# Patient Record
Sex: MALE | Race: WHITE | NOT HISPANIC OR LATINO | Employment: OTHER | URBAN - METROPOLITAN AREA
[De-identification: names, ages, dates, MRNs, and addresses within clinical notes are randomized per-mention and may not be internally consistent; named-entity substitution may affect disease eponyms.]

---

## 2017-01-07 ENCOUNTER — COMMUNICATION - HEALTHEAST (OUTPATIENT)
Dept: FAMILY MEDICINE | Facility: CLINIC | Age: 62
End: 2017-01-07

## 2017-01-07 DIAGNOSIS — K21.9 GERD (GASTROESOPHAGEAL REFLUX DISEASE): ICD-10-CM

## 2017-01-19 ENCOUNTER — RECORDS - HEALTHEAST (OUTPATIENT)
Dept: ADMINISTRATIVE | Facility: OTHER | Age: 62
End: 2017-01-19

## 2017-02-01 ENCOUNTER — OFFICE VISIT - HEALTHEAST (OUTPATIENT)
Dept: FAMILY MEDICINE | Facility: CLINIC | Age: 62
End: 2017-02-01

## 2017-02-01 DIAGNOSIS — Z00.00 PHYSICAL EXAM: ICD-10-CM

## 2017-02-01 DIAGNOSIS — E78.5 HYPERLIPIDEMIA, UNSPECIFIED HYPERLIPIDEMIA TYPE: ICD-10-CM

## 2017-02-01 LAB
CHOLEST SERPL-MCNC: 228 MG/DL
FASTING STATUS PATIENT QL REPORTED: YES
HDLC SERPL-MCNC: 43 MG/DL
LDLC SERPL CALC-MCNC: 145 MG/DL
TRIGL SERPL-MCNC: 198 MG/DL

## 2017-02-01 ASSESSMENT — MIFFLIN-ST. JEOR: SCORE: 1776.9

## 2017-02-02 ENCOUNTER — COMMUNICATION - HEALTHEAST (OUTPATIENT)
Dept: FAMILY MEDICINE | Facility: CLINIC | Age: 62
End: 2017-02-02

## 2017-02-23 ENCOUNTER — COMMUNICATION - HEALTHEAST (OUTPATIENT)
Dept: FAMILY MEDICINE | Facility: CLINIC | Age: 62
End: 2017-02-23

## 2017-02-23 DIAGNOSIS — E78.5 HYPERLIPIDEMIA: ICD-10-CM

## 2017-03-03 ENCOUNTER — COMMUNICATION - HEALTHEAST (OUTPATIENT)
Dept: FAMILY MEDICINE | Facility: CLINIC | Age: 62
End: 2017-03-03

## 2017-03-03 DIAGNOSIS — E78.00 HYPERCHOLESTEREMIA: ICD-10-CM

## 2017-05-03 ENCOUNTER — OFFICE VISIT - HEALTHEAST (OUTPATIENT)
Dept: FAMILY MEDICINE | Facility: CLINIC | Age: 62
End: 2017-05-03

## 2017-05-03 DIAGNOSIS — M19.90 ARTHRITIS: ICD-10-CM

## 2017-05-03 ASSESSMENT — MIFFLIN-ST. JEOR: SCORE: 1754.22

## 2017-05-08 ENCOUNTER — COMMUNICATION - HEALTHEAST (OUTPATIENT)
Dept: FAMILY MEDICINE | Facility: CLINIC | Age: 62
End: 2017-05-08

## 2017-05-08 ENCOUNTER — AMBULATORY - HEALTHEAST (OUTPATIENT)
Dept: FAMILY MEDICINE | Facility: CLINIC | Age: 62
End: 2017-05-08

## 2017-05-08 DIAGNOSIS — M19.90 ARTHRITIS: ICD-10-CM

## 2017-05-17 ENCOUNTER — RECORDS - HEALTHEAST (OUTPATIENT)
Dept: GENERAL RADIOLOGY | Age: 62
End: 2017-05-17

## 2017-05-17 ENCOUNTER — OFFICE VISIT - HEALTHEAST (OUTPATIENT)
Dept: RHEUMATOLOGY | Facility: CLINIC | Age: 62
End: 2017-05-17

## 2017-05-17 DIAGNOSIS — M10.9 GOUT, UNSPECIFIED: ICD-10-CM

## 2017-05-17 DIAGNOSIS — M10.9 GOUTY ARTHROPATHY: ICD-10-CM

## 2017-05-17 DIAGNOSIS — M25.50 PAIN IN UNSPECIFIED JOINT: ICD-10-CM

## 2017-05-17 DIAGNOSIS — M25.50 POLYARTHRALGIA: ICD-10-CM

## 2017-05-17 DIAGNOSIS — M15.0 PRIMARY OSTEOARTHRITIS INVOLVING MULTIPLE JOINTS: ICD-10-CM

## 2017-05-17 LAB
ALT SERPL W P-5'-P-CCNC: 32 U/L (ref 0–45)
CREAT SERPL-MCNC: 0.95 MG/DL (ref 0.7–1.3)
GFR SERPL CREATININE-BSD FRML MDRD: >60 ML/MIN/1.73M2

## 2017-08-08 ENCOUNTER — COMMUNICATION - HEALTHEAST (OUTPATIENT)
Dept: FAMILY MEDICINE | Facility: CLINIC | Age: 62
End: 2017-08-08

## 2017-08-08 DIAGNOSIS — K21.9 GERD (GASTROESOPHAGEAL REFLUX DISEASE): ICD-10-CM

## 2017-08-09 ENCOUNTER — OFFICE VISIT - HEALTHEAST (OUTPATIENT)
Dept: RHEUMATOLOGY | Facility: CLINIC | Age: 62
End: 2017-08-09

## 2017-08-09 DIAGNOSIS — M10.9 GOUTY ARTHROPATHY: ICD-10-CM

## 2017-08-09 DIAGNOSIS — M15.0 PRIMARY OSTEOARTHRITIS INVOLVING MULTIPLE JOINTS: ICD-10-CM

## 2017-08-09 ASSESSMENT — MIFFLIN-ST. JEOR: SCORE: 1731.54

## 2018-02-03 ENCOUNTER — COMMUNICATION - HEALTHEAST (OUTPATIENT)
Dept: FAMILY MEDICINE | Facility: CLINIC | Age: 63
End: 2018-02-03

## 2018-02-03 DIAGNOSIS — K21.9 GERD (GASTROESOPHAGEAL REFLUX DISEASE): ICD-10-CM

## 2018-02-14 ENCOUNTER — OFFICE VISIT - HEALTHEAST (OUTPATIENT)
Dept: FAMILY MEDICINE | Facility: CLINIC | Age: 63
End: 2018-02-14

## 2018-02-14 DIAGNOSIS — Z23 NEED FOR IMMUNIZATION AGAINST INFLUENZA: ICD-10-CM

## 2018-02-14 DIAGNOSIS — E55.9 VITAMIN D DEFICIENCY: ICD-10-CM

## 2018-02-14 DIAGNOSIS — Z00.00 PHYSICAL EXAM: ICD-10-CM

## 2018-02-14 DIAGNOSIS — E78.2 MIXED HYPERLIPIDEMIA: ICD-10-CM

## 2018-02-14 LAB
ALBUMIN SERPL-MCNC: 4 G/DL (ref 3.5–5)
ALP SERPL-CCNC: 88 U/L (ref 45–120)
ALT SERPL W P-5'-P-CCNC: 37 U/L (ref 0–45)
ANION GAP SERPL CALCULATED.3IONS-SCNC: 9 MMOL/L (ref 5–18)
AST SERPL W P-5'-P-CCNC: 24 U/L (ref 0–40)
BILIRUB SERPL-MCNC: 0.7 MG/DL (ref 0–1)
BUN SERPL-MCNC: 20 MG/DL (ref 8–22)
CALCIUM SERPL-MCNC: 9.5 MG/DL (ref 8.5–10.5)
CHLORIDE BLD-SCNC: 104 MMOL/L (ref 98–107)
CHOLEST SERPL-MCNC: 214 MG/DL
CO2 SERPL-SCNC: 27 MMOL/L (ref 22–31)
CREAT SERPL-MCNC: 0.81 MG/DL (ref 0.7–1.3)
ERYTHROCYTE [DISTWIDTH] IN BLOOD BY AUTOMATED COUNT: 12 % (ref 11–14.5)
FASTING STATUS PATIENT QL REPORTED: YES
GFR SERPL CREATININE-BSD FRML MDRD: >60 ML/MIN/1.73M2
GLUCOSE BLD-MCNC: 94 MG/DL (ref 70–125)
HCT VFR BLD AUTO: 45.7 % (ref 40–54)
HDLC SERPL-MCNC: 43 MG/DL
HGB BLD-MCNC: 15.5 G/DL (ref 14–18)
LDLC SERPL CALC-MCNC: 130 MG/DL
MCH RBC QN AUTO: 29.5 PG (ref 27–34)
MCHC RBC AUTO-ENTMCNC: 33.9 G/DL (ref 32–36)
MCV RBC AUTO: 87 FL (ref 80–100)
PLATELET # BLD AUTO: 166 THOU/UL (ref 140–440)
PMV BLD AUTO: 8.7 FL (ref 7–10)
POTASSIUM BLD-SCNC: 4.4 MMOL/L (ref 3.5–5)
PROT SERPL-MCNC: 7.3 G/DL (ref 6–8)
RBC # BLD AUTO: 5.25 MILL/UL (ref 4.4–6.2)
SODIUM SERPL-SCNC: 140 MMOL/L (ref 136–145)
TRIGL SERPL-MCNC: 205 MG/DL
WBC: 8 THOU/UL (ref 4–11)

## 2018-02-14 ASSESSMENT — MIFFLIN-ST. JEOR: SCORE: 1758.76

## 2018-02-15 LAB — 25(OH)D3 SERPL-MCNC: 35.3 NG/ML (ref 30–80)

## 2018-02-21 ENCOUNTER — COMMUNICATION - HEALTHEAST (OUTPATIENT)
Dept: FAMILY MEDICINE | Facility: CLINIC | Age: 63
End: 2018-02-21

## 2018-03-07 ENCOUNTER — COMMUNICATION - HEALTHEAST (OUTPATIENT)
Dept: FAMILY MEDICINE | Facility: CLINIC | Age: 63
End: 2018-03-07

## 2018-03-07 DIAGNOSIS — E78.5 HYPERLIPIDEMIA: ICD-10-CM

## 2018-04-12 ENCOUNTER — RECORDS - HEALTHEAST (OUTPATIENT)
Dept: ADMINISTRATIVE | Facility: OTHER | Age: 63
End: 2018-04-12

## 2018-06-08 ENCOUNTER — COMMUNICATION - HEALTHEAST (OUTPATIENT)
Dept: FAMILY MEDICINE | Facility: CLINIC | Age: 63
End: 2018-06-08

## 2018-06-08 DIAGNOSIS — E78.5 HYPERLIPIDEMIA: ICD-10-CM

## 2018-08-01 ENCOUNTER — COMMUNICATION - HEALTHEAST (OUTPATIENT)
Dept: FAMILY MEDICINE | Facility: CLINIC | Age: 63
End: 2018-08-01

## 2018-08-01 DIAGNOSIS — K21.9 GERD (GASTROESOPHAGEAL REFLUX DISEASE): ICD-10-CM

## 2018-11-03 ENCOUNTER — OFFICE VISIT - HEALTHEAST (OUTPATIENT)
Dept: FAMILY MEDICINE | Facility: CLINIC | Age: 63
End: 2018-11-03

## 2018-11-03 DIAGNOSIS — R07.0 THROAT PAIN: ICD-10-CM

## 2018-11-03 DIAGNOSIS — J20.9 ACUTE EXACERBATION OF CHRONIC BRONCHITIS (H): ICD-10-CM

## 2018-11-03 DIAGNOSIS — J42 ACUTE EXACERBATION OF CHRONIC BRONCHITIS (H): ICD-10-CM

## 2018-11-03 LAB — DEPRECATED S PYO AG THROAT QL EIA: NORMAL

## 2018-11-04 LAB — GROUP A STREP BY PCR: NORMAL

## 2018-11-08 ENCOUNTER — RECORDS - HEALTHEAST (OUTPATIENT)
Dept: ADMINISTRATIVE | Facility: OTHER | Age: 63
End: 2018-11-08

## 2018-12-05 ENCOUNTER — OFFICE VISIT - HEALTHEAST (OUTPATIENT)
Dept: FAMILY MEDICINE | Facility: CLINIC | Age: 63
End: 2018-12-05

## 2018-12-05 DIAGNOSIS — Z01.818 PREOPERATIVE EXAMINATION: ICD-10-CM

## 2018-12-05 DIAGNOSIS — M16.12 PRIMARY OSTEOARTHRITIS OF LEFT HIP: ICD-10-CM

## 2018-12-05 DIAGNOSIS — M10.9 GOUTY ARTHROPATHY: ICD-10-CM

## 2018-12-05 DIAGNOSIS — K21.9 GASTROESOPHAGEAL REFLUX DISEASE WITHOUT ESOPHAGITIS: ICD-10-CM

## 2018-12-05 LAB
ANION GAP SERPL CALCULATED.3IONS-SCNC: 10 MMOL/L (ref 5–18)
ATRIAL RATE - MUSE: 72 BPM
BUN SERPL-MCNC: 20 MG/DL (ref 8–22)
CALCIUM SERPL-MCNC: 9.8 MG/DL (ref 8.5–10.5)
CHLORIDE BLD-SCNC: 103 MMOL/L (ref 98–107)
CO2 SERPL-SCNC: 26 MMOL/L (ref 22–31)
CREAT SERPL-MCNC: 0.87 MG/DL (ref 0.7–1.3)
DIASTOLIC BLOOD PRESSURE - MUSE: NORMAL MMHG
ERYTHROCYTE [DISTWIDTH] IN BLOOD BY AUTOMATED COUNT: 11.5 % (ref 11–14.5)
GFR SERPL CREATININE-BSD FRML MDRD: >60 ML/MIN/1.73M2
GLUCOSE BLD-MCNC: 105 MG/DL (ref 70–125)
HCT VFR BLD AUTO: 42.9 % (ref 40–54)
HGB BLD-MCNC: 14.5 G/DL (ref 14–18)
INTERPRETATION ECG - MUSE: NORMAL
MCH RBC QN AUTO: 29.1 PG (ref 27–34)
MCHC RBC AUTO-ENTMCNC: 33.7 G/DL (ref 32–36)
MCV RBC AUTO: 86 FL (ref 80–100)
P AXIS - MUSE: 70 DEGREES
PLATELET # BLD AUTO: 181 THOU/UL (ref 140–440)
PMV BLD AUTO: 9.1 FL (ref 7–10)
POTASSIUM BLD-SCNC: 4.4 MMOL/L (ref 3.5–5)
PR INTERVAL - MUSE: 152 MS
QRS DURATION - MUSE: 88 MS
QT - MUSE: 390 MS
QTC - MUSE: 427 MS
R AXIS - MUSE: 7 DEGREES
RBC # BLD AUTO: 4.97 MILL/UL (ref 4.4–6.2)
SODIUM SERPL-SCNC: 139 MMOL/L (ref 136–145)
SYSTOLIC BLOOD PRESSURE - MUSE: NORMAL MMHG
T AXIS - MUSE: 10 DEGREES
VENTRICULAR RATE- MUSE: 72 BPM
WBC: 8.4 THOU/UL (ref 4–11)

## 2018-12-05 ASSESSMENT — MIFFLIN-ST. JEOR: SCORE: 1758.76

## 2018-12-28 ENCOUNTER — RECORDS - HEALTHEAST (OUTPATIENT)
Dept: ADMINISTRATIVE | Facility: OTHER | Age: 63
End: 2018-12-28

## 2019-01-11 ENCOUNTER — RECORDS - HEALTHEAST (OUTPATIENT)
Dept: ADMINISTRATIVE | Facility: OTHER | Age: 64
End: 2019-01-11

## 2019-01-24 ENCOUNTER — COMMUNICATION - HEALTHEAST (OUTPATIENT)
Dept: FAMILY MEDICINE | Facility: CLINIC | Age: 64
End: 2019-01-24

## 2019-01-24 DIAGNOSIS — K21.9 GERD (GASTROESOPHAGEAL REFLUX DISEASE): ICD-10-CM

## 2019-02-11 ENCOUNTER — RECORDS - HEALTHEAST (OUTPATIENT)
Dept: ADMINISTRATIVE | Facility: OTHER | Age: 64
End: 2019-02-11

## 2019-02-26 ENCOUNTER — COMMUNICATION - HEALTHEAST (OUTPATIENT)
Dept: FAMILY MEDICINE | Facility: CLINIC | Age: 64
End: 2019-02-26

## 2019-02-26 DIAGNOSIS — E78.5 HYPERLIPIDEMIA: ICD-10-CM

## 2019-03-19 ENCOUNTER — COMMUNICATION - HEALTHEAST (OUTPATIENT)
Dept: TELEHEALTH | Facility: CLINIC | Age: 64
End: 2019-03-19

## 2019-03-19 ENCOUNTER — COMMUNICATION - HEALTHEAST (OUTPATIENT)
Dept: FAMILY MEDICINE | Facility: CLINIC | Age: 64
End: 2019-03-19

## 2019-03-19 ENCOUNTER — OFFICE VISIT - HEALTHEAST (OUTPATIENT)
Dept: FAMILY MEDICINE | Facility: CLINIC | Age: 64
End: 2019-03-19

## 2019-03-19 DIAGNOSIS — K21.9 GASTROESOPHAGEAL REFLUX DISEASE WITHOUT ESOPHAGITIS: ICD-10-CM

## 2019-03-19 DIAGNOSIS — M15.0 PRIMARY OSTEOARTHRITIS INVOLVING MULTIPLE JOINTS: ICD-10-CM

## 2019-03-19 DIAGNOSIS — E78.5 HYPERLIPIDEMIA: ICD-10-CM

## 2019-03-19 DIAGNOSIS — M10.9 GOUTY ARTHROPATHY: ICD-10-CM

## 2019-03-19 ASSESSMENT — MIFFLIN-ST. JEOR: SCORE: 1774.18

## 2019-03-28 ENCOUNTER — RECORDS - HEALTHEAST (OUTPATIENT)
Dept: ADMINISTRATIVE | Facility: OTHER | Age: 64
End: 2019-03-28

## 2019-06-18 ENCOUNTER — COMMUNICATION - HEALTHEAST (OUTPATIENT)
Dept: TELEHEALTH | Facility: CLINIC | Age: 64
End: 2019-06-18

## 2019-06-18 ENCOUNTER — OFFICE VISIT - HEALTHEAST (OUTPATIENT)
Dept: FAMILY MEDICINE | Facility: CLINIC | Age: 64
End: 2019-06-18

## 2019-06-18 DIAGNOSIS — I73.9 CLAUDICATION (H): ICD-10-CM

## 2019-06-18 DIAGNOSIS — M25.511 CHRONIC PAIN OF BOTH SHOULDERS: ICD-10-CM

## 2019-06-18 DIAGNOSIS — E78.5 HYPERLIPIDEMIA: ICD-10-CM

## 2019-06-18 DIAGNOSIS — M25.512 CHRONIC PAIN OF BOTH SHOULDERS: ICD-10-CM

## 2019-06-18 DIAGNOSIS — Z00.01 ENCOUNTER FOR GENERAL ADULT MEDICAL EXAMINATION WITH ABNORMAL FINDINGS: ICD-10-CM

## 2019-06-18 DIAGNOSIS — G89.29 CHRONIC PAIN OF BOTH SHOULDERS: ICD-10-CM

## 2019-06-18 LAB
ALBUMIN SERPL-MCNC: 4.1 G/DL (ref 3.5–5)
ALP SERPL-CCNC: 84 U/L (ref 45–120)
ALT SERPL W P-5'-P-CCNC: 31 U/L (ref 0–45)
ANION GAP SERPL CALCULATED.3IONS-SCNC: 8 MMOL/L (ref 5–18)
AST SERPL W P-5'-P-CCNC: 22 U/L (ref 0–40)
BILIRUB SERPL-MCNC: 0.4 MG/DL (ref 0–1)
BUN SERPL-MCNC: 16 MG/DL (ref 8–22)
CALCIUM SERPL-MCNC: 10 MG/DL (ref 8.5–10.5)
CHLORIDE BLD-SCNC: 107 MMOL/L (ref 98–107)
CHOLEST SERPL-MCNC: 190 MG/DL
CO2 SERPL-SCNC: 27 MMOL/L (ref 22–31)
CREAT SERPL-MCNC: 0.85 MG/DL (ref 0.7–1.3)
FASTING STATUS PATIENT QL REPORTED: YES
GFR SERPL CREATININE-BSD FRML MDRD: >60 ML/MIN/1.73M2
GLUCOSE BLD-MCNC: 94 MG/DL (ref 70–125)
HDLC SERPL-MCNC: 47 MG/DL
LDLC SERPL CALC-MCNC: 110 MG/DL
POTASSIUM BLD-SCNC: 4.4 MMOL/L (ref 3.5–5)
PROT SERPL-MCNC: 7.1 G/DL (ref 6–8)
SODIUM SERPL-SCNC: 142 MMOL/L (ref 136–145)
TRIGL SERPL-MCNC: 167 MG/DL

## 2019-06-18 ASSESSMENT — MIFFLIN-ST. JEOR: SCORE: 1749.68

## 2019-06-25 ENCOUNTER — RECORDS - HEALTHEAST (OUTPATIENT)
Dept: VASCULAR ULTRASOUND | Facility: CLINIC | Age: 64
End: 2019-06-25

## 2019-06-25 DIAGNOSIS — I73.9 PERIPHERAL VASCULAR DISEASE, UNSPECIFIED (H): ICD-10-CM

## 2019-06-30 ENCOUNTER — AMBULATORY - HEALTHEAST (OUTPATIENT)
Dept: FAMILY MEDICINE | Facility: CLINIC | Age: 64
End: 2019-06-30

## 2019-06-30 DIAGNOSIS — I73.9 PVD (PERIPHERAL VASCULAR DISEASE) (H): ICD-10-CM

## 2019-07-11 ENCOUNTER — COMMUNICATION - HEALTHEAST (OUTPATIENT)
Dept: FAMILY MEDICINE | Facility: CLINIC | Age: 64
End: 2019-07-11

## 2019-07-11 DIAGNOSIS — M25.511 CHRONIC PAIN OF BOTH SHOULDERS: ICD-10-CM

## 2019-07-11 DIAGNOSIS — G89.29 CHRONIC PAIN OF BOTH SHOULDERS: ICD-10-CM

## 2019-07-11 DIAGNOSIS — M25.512 CHRONIC PAIN OF BOTH SHOULDERS: ICD-10-CM

## 2019-07-18 ENCOUNTER — OFFICE VISIT - HEALTHEAST (OUTPATIENT)
Dept: VASCULAR SURGERY | Facility: CLINIC | Age: 64
End: 2019-07-18

## 2019-07-18 DIAGNOSIS — Z13.6 SCREENING FOR AAA (ABDOMINAL AORTIC ANEURYSM): ICD-10-CM

## 2019-07-18 DIAGNOSIS — I65.29 CAROTID STENOSIS: ICD-10-CM

## 2019-07-18 DIAGNOSIS — I73.9 PVD (PERIPHERAL VASCULAR DISEASE) (H): ICD-10-CM

## 2019-07-18 DIAGNOSIS — Z82.3 FAMILY HX-STROKE: ICD-10-CM

## 2019-07-18 ASSESSMENT — MIFFLIN-ST. JEOR: SCORE: 1758.76

## 2019-07-26 ENCOUNTER — COMMUNICATION - HEALTHEAST (OUTPATIENT)
Dept: VASCULAR SURGERY | Facility: CLINIC | Age: 64
End: 2019-07-26

## 2019-08-13 ENCOUNTER — OFFICE VISIT - HEALTHEAST (OUTPATIENT)
Dept: FAMILY MEDICINE | Facility: CLINIC | Age: 64
End: 2019-08-13

## 2019-08-13 DIAGNOSIS — I73.9 CLAUDICATION (H): ICD-10-CM

## 2019-08-13 DIAGNOSIS — I73.9 PVD (PERIPHERAL VASCULAR DISEASE) (H): ICD-10-CM

## 2019-08-13 DIAGNOSIS — E78.5 HYPERLIPIDEMIA, UNSPECIFIED HYPERLIPIDEMIA TYPE: ICD-10-CM

## 2019-08-13 DIAGNOSIS — Z01.818 PREOP GENERAL PHYSICAL EXAM: ICD-10-CM

## 2019-08-13 LAB
ATRIAL RATE - MUSE: 64 BPM
DIASTOLIC BLOOD PRESSURE - MUSE: NORMAL MMHG
ERYTHROCYTE [DISTWIDTH] IN BLOOD BY AUTOMATED COUNT: 11.8 % (ref 11–14.5)
HCT VFR BLD AUTO: 44.7 % (ref 40–54)
HGB BLD-MCNC: 15.4 G/DL (ref 14–18)
INTERPRETATION ECG - MUSE: NORMAL
MCH RBC QN AUTO: 30.6 PG (ref 27–34)
MCHC RBC AUTO-ENTMCNC: 34.5 G/DL (ref 32–36)
MCV RBC AUTO: 89 FL (ref 80–100)
P AXIS - MUSE: 81 DEGREES
PLATELET # BLD AUTO: 150 THOU/UL (ref 140–440)
PMV BLD AUTO: 8.3 FL (ref 7–10)
PR INTERVAL - MUSE: 150 MS
QRS DURATION - MUSE: 90 MS
QT - MUSE: 398 MS
QTC - MUSE: 410 MS
R AXIS - MUSE: 14 DEGREES
RBC # BLD AUTO: 5.03 MILL/UL (ref 4.4–6.2)
SYSTOLIC BLOOD PRESSURE - MUSE: NORMAL MMHG
T AXIS - MUSE: 23 DEGREES
VENTRICULAR RATE- MUSE: 64 BPM
WBC: 6.6 THOU/UL (ref 4–11)

## 2019-08-13 ASSESSMENT — MIFFLIN-ST. JEOR: SCORE: 1745.6

## 2019-08-22 ENCOUNTER — COMMUNICATION - HEALTHEAST (OUTPATIENT)
Dept: VASCULAR SURGERY | Facility: CLINIC | Age: 64
End: 2019-08-22

## 2019-08-23 ENCOUNTER — HOSPITAL ENCOUNTER (OUTPATIENT)
Dept: INTERVENTIONAL RADIOLOGY/VASCULAR | Facility: HOSPITAL | Age: 64
Discharge: HOME | End: 2019-08-23
Attending: SURGERY | Admitting: SURGERY

## 2019-08-23 DIAGNOSIS — I73.9 PVD (PERIPHERAL VASCULAR DISEASE) (H): ICD-10-CM

## 2019-08-23 LAB
ACT BLD: 223 SECONDS (ref 105–167)
CREAT SERPL-MCNC: 0.97 MG/DL (ref 0.7–1.3)
GFR SERPL CREATININE-BSD FRML MDRD: >60 ML/MIN/1.73M2
HGB BLD-MCNC: 15.6 G/DL (ref 14–18)
INR PPP: 1 (ref 0.9–1.1)
PLATELET # BLD AUTO: 161 THOU/UL (ref 140–440)
POTASSIUM BLD-SCNC: 4.4 MMOL/L (ref 3.5–5)

## 2019-08-23 ASSESSMENT — MIFFLIN-ST. JEOR: SCORE: 1740.61

## 2019-09-04 ENCOUNTER — COMMUNICATION - HEALTHEAST (OUTPATIENT)
Dept: VASCULAR SURGERY | Facility: CLINIC | Age: 64
End: 2019-09-04

## 2019-09-04 ENCOUNTER — RECORDS - HEALTHEAST (OUTPATIENT)
Dept: VASCULAR ULTRASOUND | Facility: CLINIC | Age: 64
End: 2019-09-04

## 2019-09-04 ENCOUNTER — OFFICE VISIT - HEALTHEAST (OUTPATIENT)
Dept: VASCULAR SURGERY | Facility: CLINIC | Age: 64
End: 2019-09-04

## 2019-09-04 DIAGNOSIS — Z13.6 ENCOUNTER FOR SCREENING FOR CARDIOVASCULAR DISORDERS: ICD-10-CM

## 2019-09-04 DIAGNOSIS — I73.9 PERIPHERAL VASCULAR DISEASE, UNSPECIFIED (H): ICD-10-CM

## 2019-09-04 DIAGNOSIS — I73.9 PVD (PERIPHERAL VASCULAR DISEASE) (H): ICD-10-CM

## 2019-09-04 DIAGNOSIS — I65.29 CAROTID STENOSIS: ICD-10-CM

## 2019-09-04 DIAGNOSIS — I65.29 OCCLUSION AND STENOSIS OF UNSPECIFIED CAROTID ARTERY: ICD-10-CM

## 2019-09-04 DIAGNOSIS — Z13.6 SCREENING FOR AAA (ABDOMINAL AORTIC ANEURYSM): ICD-10-CM

## 2019-09-04 DIAGNOSIS — Z82.3 FAMILY HISTORY OF STROKE: ICD-10-CM

## 2019-10-12 ENCOUNTER — COMMUNICATION - HEALTHEAST (OUTPATIENT)
Dept: FAMILY MEDICINE | Facility: CLINIC | Age: 64
End: 2019-10-12

## 2019-10-12 DIAGNOSIS — M25.511 CHRONIC PAIN OF BOTH SHOULDERS: ICD-10-CM

## 2019-10-12 DIAGNOSIS — M25.512 CHRONIC PAIN OF BOTH SHOULDERS: ICD-10-CM

## 2019-10-12 DIAGNOSIS — G89.29 CHRONIC PAIN OF BOTH SHOULDERS: ICD-10-CM

## 2019-10-23 ENCOUNTER — AMBULATORY - HEALTHEAST (OUTPATIENT)
Dept: VASCULAR SURGERY | Facility: CLINIC | Age: 64
End: 2019-10-23

## 2019-10-23 DIAGNOSIS — I73.9 PVD (PERIPHERAL VASCULAR DISEASE) (H): ICD-10-CM

## 2019-10-25 ENCOUNTER — OFFICE VISIT - HEALTHEAST (OUTPATIENT)
Dept: FAMILY MEDICINE | Facility: CLINIC | Age: 64
End: 2019-10-25

## 2019-10-25 DIAGNOSIS — E78.2 MIXED HYPERLIPIDEMIA: ICD-10-CM

## 2019-10-25 DIAGNOSIS — Z01.818 PREOP GENERAL PHYSICAL EXAM: ICD-10-CM

## 2019-10-25 DIAGNOSIS — I73.9 PVD (PERIPHERAL VASCULAR DISEASE) (H): ICD-10-CM

## 2019-10-25 DIAGNOSIS — I71.40 ABDOMINAL AORTIC ANEURYSM (AAA) WITHOUT RUPTURE (H): ICD-10-CM

## 2019-10-25 DIAGNOSIS — M10.9 GOUTY ARTHROPATHY: ICD-10-CM

## 2019-10-25 DIAGNOSIS — I65.22 CAROTID OCCLUSION, LEFT: ICD-10-CM

## 2019-10-25 DIAGNOSIS — M15.0 PRIMARY OSTEOARTHRITIS INVOLVING MULTIPLE JOINTS: ICD-10-CM

## 2019-10-25 DIAGNOSIS — H26.9 CATARACT, UNSPECIFIED CATARACT TYPE, UNSPECIFIED LATERALITY: ICD-10-CM

## 2019-10-25 ASSESSMENT — MIFFLIN-ST. JEOR: SCORE: 1770.1

## 2019-12-04 ENCOUNTER — RECORDS - HEALTHEAST (OUTPATIENT)
Dept: VASCULAR ULTRASOUND | Facility: CLINIC | Age: 64
End: 2019-12-04

## 2019-12-04 ENCOUNTER — HOSPITAL ENCOUNTER (OUTPATIENT)
Dept: CT IMAGING | Facility: HOSPITAL | Age: 64
Discharge: HOME | End: 2019-12-04
Attending: SURGERY

## 2019-12-04 ENCOUNTER — OFFICE VISIT - HEALTHEAST (OUTPATIENT)
Dept: VASCULAR SURGERY | Facility: CLINIC | Age: 64
End: 2019-12-04

## 2019-12-04 DIAGNOSIS — Z13.6 SCREENING FOR AAA (ABDOMINAL AORTIC ANEURYSM): ICD-10-CM

## 2019-12-04 DIAGNOSIS — I73.9 PERIPHERAL VASCULAR DISEASE, UNSPECIFIED (H): ICD-10-CM

## 2019-12-04 DIAGNOSIS — I73.9 PVD (PERIPHERAL VASCULAR DISEASE) (H): ICD-10-CM

## 2020-02-08 ENCOUNTER — COMMUNICATION - HEALTHEAST (OUTPATIENT)
Dept: FAMILY MEDICINE | Facility: CLINIC | Age: 65
End: 2020-02-08

## 2020-02-08 DIAGNOSIS — K21.9 GERD (GASTROESOPHAGEAL REFLUX DISEASE): ICD-10-CM

## 2020-02-19 ENCOUNTER — OFFICE VISIT - HEALTHEAST (OUTPATIENT)
Dept: FAMILY MEDICINE | Facility: CLINIC | Age: 65
End: 2020-02-19

## 2020-02-19 DIAGNOSIS — I10 HYPERTENSION, UNSPECIFIED TYPE: ICD-10-CM

## 2020-02-19 DIAGNOSIS — S61.012A LACERATION OF LEFT THUMB WITHOUT FOREIGN BODY WITHOUT DAMAGE TO NAIL, INITIAL ENCOUNTER: ICD-10-CM

## 2020-02-26 ENCOUNTER — RECORDS - HEALTHEAST (OUTPATIENT)
Dept: ADMINISTRATIVE | Facility: OTHER | Age: 65
End: 2020-02-26

## 2020-03-04 ENCOUNTER — OFFICE VISIT - HEALTHEAST (OUTPATIENT)
Dept: VASCULAR SURGERY | Facility: CLINIC | Age: 65
End: 2020-03-04

## 2020-03-04 ENCOUNTER — RECORDS - HEALTHEAST (OUTPATIENT)
Dept: VASCULAR ULTRASOUND | Facility: CLINIC | Age: 65
End: 2020-03-04

## 2020-03-04 ENCOUNTER — COMMUNICATION - HEALTHEAST (OUTPATIENT)
Dept: VASCULAR SURGERY | Facility: CLINIC | Age: 65
End: 2020-03-04

## 2020-03-04 DIAGNOSIS — I73.9 PVD (PERIPHERAL VASCULAR DISEASE) (H): ICD-10-CM

## 2020-03-04 DIAGNOSIS — I73.9 PERIPHERAL VASCULAR DISEASE, UNSPECIFIED (H): ICD-10-CM

## 2020-03-16 ENCOUNTER — COMMUNICATION - HEALTHEAST (OUTPATIENT)
Dept: FAMILY MEDICINE | Facility: CLINIC | Age: 65
End: 2020-03-16

## 2020-03-16 DIAGNOSIS — M10.9 GOUTY ARTHROPATHY: ICD-10-CM

## 2020-03-30 ENCOUNTER — COMMUNICATION - HEALTHEAST (OUTPATIENT)
Dept: INTERVENTIONAL RADIOLOGY/VASCULAR | Facility: CLINIC | Age: 65
End: 2020-03-30

## 2020-03-31 ENCOUNTER — COMMUNICATION - HEALTHEAST (OUTPATIENT)
Dept: VASCULAR SURGERY | Facility: CLINIC | Age: 65
End: 2020-03-31

## 2020-05-13 ENCOUNTER — OFFICE VISIT - HEALTHEAST (OUTPATIENT)
Dept: VASCULAR SURGERY | Facility: CLINIC | Age: 65
End: 2020-05-13

## 2020-05-13 ENCOUNTER — AMBULATORY - HEALTHEAST (OUTPATIENT)
Dept: VASCULAR SURGERY | Facility: CLINIC | Age: 65
End: 2020-05-13

## 2020-05-13 DIAGNOSIS — Z11.59 ENCOUNTER FOR SCREENING FOR OTHER VIRAL DISEASES: ICD-10-CM

## 2020-05-13 DIAGNOSIS — I73.9 PVD (PERIPHERAL VASCULAR DISEASE) (H): ICD-10-CM

## 2020-05-13 ASSESSMENT — MIFFLIN-ST. JEOR: SCORE: 1758.76

## 2020-05-14 ENCOUNTER — COMMUNICATION - HEALTHEAST (OUTPATIENT)
Dept: VASCULAR SURGERY | Facility: CLINIC | Age: 65
End: 2020-05-14

## 2020-05-20 ENCOUNTER — OFFICE VISIT - HEALTHEAST (OUTPATIENT)
Dept: FAMILY MEDICINE | Facility: CLINIC | Age: 65
End: 2020-05-20

## 2020-05-20 DIAGNOSIS — Z11.59 ENCOUNTER FOR SCREENING FOR OTHER VIRAL DISEASES: ICD-10-CM

## 2020-05-21 ENCOUNTER — COMMUNICATION - HEALTHEAST (OUTPATIENT)
Dept: FAMILY MEDICINE | Facility: CLINIC | Age: 65
End: 2020-05-21

## 2020-05-22 ENCOUNTER — HOSPITAL ENCOUNTER (OUTPATIENT)
Dept: INTERVENTIONAL RADIOLOGY/VASCULAR | Facility: HOSPITAL | Age: 65
Discharge: HOME | End: 2020-05-22
Attending: SURGERY | Admitting: SURGERY

## 2020-05-22 DIAGNOSIS — I73.9 PVD (PERIPHERAL VASCULAR DISEASE) (H): ICD-10-CM

## 2020-05-22 LAB
ACT BLD: 203 SECONDS (ref 105–167)
CREAT SERPL-MCNC: 0.87 MG/DL (ref 0.7–1.3)
GFR SERPL CREATININE-BSD FRML MDRD: >60 ML/MIN/1.73M2
HGB BLD-MCNC: 14.4 G/DL (ref 14–18)
INR PPP: 1.01 (ref 0.9–1.1)
PLATELET # BLD AUTO: 154 THOU/UL (ref 140–440)
POTASSIUM BLD-SCNC: 4.3 MMOL/L (ref 3.5–5)

## 2020-05-22 ASSESSMENT — MIFFLIN-ST. JEOR: SCORE: 1772.36

## 2020-05-28 ENCOUNTER — COMMUNICATION - HEALTHEAST (OUTPATIENT)
Dept: FAMILY MEDICINE | Facility: CLINIC | Age: 65
End: 2020-05-28

## 2020-05-28 DIAGNOSIS — M10.9 GOUTY ARTHROPATHY: ICD-10-CM

## 2020-06-09 ENCOUNTER — COMMUNICATION - HEALTHEAST (OUTPATIENT)
Dept: VASCULAR SURGERY | Facility: CLINIC | Age: 65
End: 2020-06-09

## 2020-06-10 ENCOUNTER — OFFICE VISIT - HEALTHEAST (OUTPATIENT)
Dept: VASCULAR SURGERY | Facility: CLINIC | Age: 65
End: 2020-06-10

## 2020-06-10 ENCOUNTER — RECORDS - HEALTHEAST (OUTPATIENT)
Dept: VASCULAR ULTRASOUND | Facility: CLINIC | Age: 65
End: 2020-06-10

## 2020-06-10 DIAGNOSIS — I73.9 PVD (PERIPHERAL VASCULAR DISEASE) (H): ICD-10-CM

## 2020-06-10 DIAGNOSIS — I73.9 PERIPHERAL VASCULAR DISEASE, UNSPECIFIED (H): ICD-10-CM

## 2020-06-10 ASSESSMENT — MIFFLIN-ST. JEOR: SCORE: 1772.36

## 2020-08-13 ENCOUNTER — COMMUNICATION - HEALTHEAST (OUTPATIENT)
Dept: FAMILY MEDICINE | Facility: CLINIC | Age: 65
End: 2020-08-13

## 2020-08-13 ENCOUNTER — RECORDS - HEALTHEAST (OUTPATIENT)
Dept: ADMINISTRATIVE | Facility: OTHER | Age: 65
End: 2020-08-13

## 2020-08-13 DIAGNOSIS — E78.5 HYPERLIPIDEMIA: ICD-10-CM

## 2020-08-21 ENCOUNTER — AMBULATORY - HEALTHEAST (OUTPATIENT)
Dept: FAMILY MEDICINE | Facility: CLINIC | Age: 65
End: 2020-08-21

## 2020-08-24 ENCOUNTER — OFFICE VISIT - HEALTHEAST (OUTPATIENT)
Dept: FAMILY MEDICINE | Facility: CLINIC | Age: 65
End: 2020-08-24

## 2020-08-24 DIAGNOSIS — I73.9 PVD (PERIPHERAL VASCULAR DISEASE) (H): ICD-10-CM

## 2020-08-24 DIAGNOSIS — M54.16 LUMBAR RADICULOPATHY: ICD-10-CM

## 2020-08-24 DIAGNOSIS — M10.9 GOUTY ARTHROPATHY: ICD-10-CM

## 2020-08-24 DIAGNOSIS — M15.0 PRIMARY OSTEOARTHRITIS INVOLVING MULTIPLE JOINTS: ICD-10-CM

## 2020-08-24 DIAGNOSIS — E78.2 MIXED HYPERLIPIDEMIA: ICD-10-CM

## 2020-08-24 DIAGNOSIS — Z00.01 ENCOUNTER FOR GENERAL ADULT MEDICAL EXAMINATION WITH ABNORMAL FINDINGS: ICD-10-CM

## 2020-08-24 DIAGNOSIS — I10 ESSENTIAL HYPERTENSION: ICD-10-CM

## 2020-08-24 ASSESSMENT — MIFFLIN-ST. JEOR: SCORE: 1735.62

## 2020-08-25 ENCOUNTER — AMBULATORY - HEALTHEAST (OUTPATIENT)
Dept: VASCULAR SURGERY | Facility: CLINIC | Age: 65
End: 2020-08-25

## 2020-08-25 ENCOUNTER — AMBULATORY - HEALTHEAST (OUTPATIENT)
Dept: LAB | Facility: CLINIC | Age: 65
End: 2020-08-25

## 2020-08-25 DIAGNOSIS — E78.2 MIXED HYPERLIPIDEMIA: ICD-10-CM

## 2020-08-25 DIAGNOSIS — I73.9 PVD (PERIPHERAL VASCULAR DISEASE) (H): ICD-10-CM

## 2020-08-25 DIAGNOSIS — M10.9 GOUTY ARTHROPATHY: ICD-10-CM

## 2020-08-25 LAB
ALBUMIN SERPL-MCNC: 3.8 G/DL (ref 3.5–5)
ALP SERPL-CCNC: 88 U/L (ref 45–120)
ALT SERPL W P-5'-P-CCNC: 20 U/L (ref 0–45)
ANION GAP SERPL CALCULATED.3IONS-SCNC: 9 MMOL/L (ref 5–18)
AST SERPL W P-5'-P-CCNC: 18 U/L (ref 0–40)
BILIRUB SERPL-MCNC: 0.3 MG/DL (ref 0–1)
BUN SERPL-MCNC: 18 MG/DL (ref 8–22)
CALCIUM SERPL-MCNC: 9.4 MG/DL (ref 8.5–10.5)
CHLORIDE BLD-SCNC: 108 MMOL/L (ref 98–107)
CHOLEST SERPL-MCNC: 195 MG/DL
CO2 SERPL-SCNC: 26 MMOL/L (ref 22–31)
CREAT SERPL-MCNC: 0.96 MG/DL (ref 0.7–1.3)
FASTING STATUS PATIENT QL REPORTED: YES
GFR SERPL CREATININE-BSD FRML MDRD: >60 ML/MIN/1.73M2
GLUCOSE BLD-MCNC: 106 MG/DL (ref 70–125)
HDLC SERPL-MCNC: 38 MG/DL
LDLC SERPL CALC-MCNC: 116 MG/DL
POTASSIUM BLD-SCNC: 4.8 MMOL/L (ref 3.5–5)
PROT SERPL-MCNC: 6.6 G/DL (ref 6–8)
SODIUM SERPL-SCNC: 143 MMOL/L (ref 136–145)
TRIGL SERPL-MCNC: 204 MG/DL
URATE SERPL-MCNC: 8 MG/DL (ref 3–8)

## 2020-09-09 ENCOUNTER — RECORDS - HEALTHEAST (OUTPATIENT)
Dept: VASCULAR ULTRASOUND | Facility: CLINIC | Age: 65
End: 2020-09-09

## 2020-09-09 ENCOUNTER — OFFICE VISIT - HEALTHEAST (OUTPATIENT)
Dept: VASCULAR SURGERY | Facility: CLINIC | Age: 65
End: 2020-09-09

## 2020-09-09 ENCOUNTER — COMMUNICATION - HEALTHEAST (OUTPATIENT)
Dept: FAMILY MEDICINE | Facility: CLINIC | Age: 65
End: 2020-09-09

## 2020-09-09 DIAGNOSIS — I73.9 PERIPHERAL VASCULAR DISEASE, UNSPECIFIED (H): ICD-10-CM

## 2020-09-09 DIAGNOSIS — I73.9 PVD (PERIPHERAL VASCULAR DISEASE) (H): ICD-10-CM

## 2020-09-09 DIAGNOSIS — E78.5 HYPERLIPIDEMIA: ICD-10-CM

## 2020-09-09 ASSESSMENT — MIFFLIN-ST. JEOR: SCORE: 1740.61

## 2020-09-10 RX ORDER — SIMVASTATIN 40 MG
TABLET ORAL
Qty: 90 TABLET | Refills: 3 | Status: SHIPPED | OUTPATIENT
Start: 2020-09-10 | End: 2021-09-07

## 2020-09-24 ENCOUNTER — RECORDS - HEALTHEAST (OUTPATIENT)
Dept: ADMINISTRATIVE | Facility: OTHER | Age: 65
End: 2020-09-24

## 2020-10-02 ENCOUNTER — COMMUNICATION - HEALTHEAST (OUTPATIENT)
Dept: FAMILY MEDICINE | Facility: CLINIC | Age: 65
End: 2020-10-02

## 2020-10-02 DIAGNOSIS — M25.512 CHRONIC PAIN OF BOTH SHOULDERS: ICD-10-CM

## 2020-10-02 DIAGNOSIS — M25.511 CHRONIC PAIN OF BOTH SHOULDERS: ICD-10-CM

## 2020-10-02 DIAGNOSIS — G89.29 CHRONIC PAIN OF BOTH SHOULDERS: ICD-10-CM

## 2020-10-04 RX ORDER — CELECOXIB 100 MG/1
CAPSULE ORAL
Qty: 90 CAPSULE | Refills: 3 | Status: SHIPPED | OUTPATIENT
Start: 2020-10-04 | End: 2021-09-29

## 2020-10-16 ENCOUNTER — RECORDS - HEALTHEAST (OUTPATIENT)
Dept: ADMINISTRATIVE | Facility: OTHER | Age: 65
End: 2020-10-16

## 2020-11-14 ENCOUNTER — COMMUNICATION - HEALTHEAST (OUTPATIENT)
Dept: FAMILY MEDICINE | Facility: CLINIC | Age: 65
End: 2020-11-14

## 2020-11-14 DIAGNOSIS — K21.9 GERD (GASTROESOPHAGEAL REFLUX DISEASE): ICD-10-CM

## 2020-11-16 RX ORDER — LANSOPRAZOLE 30 MG/1
CAPSULE, DELAYED RELEASE ORAL
Qty: 90 CAPSULE | Refills: 2 | Status: SHIPPED | OUTPATIENT
Start: 2020-11-16 | End: 2021-08-12

## 2020-11-18 ENCOUNTER — COMMUNICATION - HEALTHEAST (OUTPATIENT)
Dept: VASCULAR SURGERY | Facility: CLINIC | Age: 65
End: 2020-11-18

## 2020-12-08 ENCOUNTER — AMBULATORY - HEALTHEAST (OUTPATIENT)
Dept: VASCULAR SURGERY | Facility: CLINIC | Age: 65
End: 2020-12-08

## 2020-12-08 DIAGNOSIS — I65.29 CAROTID STENOSIS: ICD-10-CM

## 2020-12-10 ENCOUNTER — HOSPITAL ENCOUNTER (OUTPATIENT)
Dept: CT IMAGING | Facility: CLINIC | Age: 65
Discharge: HOME | End: 2020-12-10
Attending: SURGERY

## 2020-12-10 DIAGNOSIS — I65.29 CAROTID STENOSIS: ICD-10-CM

## 2020-12-16 ENCOUNTER — OFFICE VISIT - HEALTHEAST (OUTPATIENT)
Dept: VASCULAR SURGERY | Facility: CLINIC | Age: 65
End: 2020-12-16

## 2020-12-16 DIAGNOSIS — I73.9 PVD (PERIPHERAL VASCULAR DISEASE) (H): ICD-10-CM

## 2020-12-16 ASSESSMENT — MIFFLIN-ST. JEOR: SCORE: 1767.83

## 2020-12-17 ENCOUNTER — RECORDS - HEALTHEAST (OUTPATIENT)
Dept: ADMINISTRATIVE | Facility: OTHER | Age: 65
End: 2020-12-17

## 2020-12-23 ENCOUNTER — OFFICE VISIT - HEALTHEAST (OUTPATIENT)
Dept: VASCULAR SURGERY | Facility: CLINIC | Age: 65
End: 2020-12-23

## 2020-12-23 DIAGNOSIS — I73.9 PVD (PERIPHERAL VASCULAR DISEASE) (H): ICD-10-CM

## 2020-12-30 ENCOUNTER — RECORDS - HEALTHEAST (OUTPATIENT)
Dept: ADMINISTRATIVE | Facility: OTHER | Age: 65
End: 2020-12-30

## 2021-01-11 ENCOUNTER — OFFICE VISIT - HEALTHEAST (OUTPATIENT)
Dept: FAMILY MEDICINE | Facility: CLINIC | Age: 66
End: 2021-01-11

## 2021-01-11 ENCOUNTER — COMMUNICATION - HEALTHEAST (OUTPATIENT)
Dept: FAMILY MEDICINE | Facility: CLINIC | Age: 66
End: 2021-01-11

## 2021-01-11 DIAGNOSIS — I10 ESSENTIAL HYPERTENSION: ICD-10-CM

## 2021-01-11 DIAGNOSIS — M10.9 GOUTY ARTHROPATHY: ICD-10-CM

## 2021-01-11 RX ORDER — ALLOPURINOL 300 MG/1
300 TABLET ORAL DAILY
Qty: 90 TABLET | Refills: 3 | Status: SHIPPED | OUTPATIENT
Start: 2021-01-11 | End: 2021-12-03

## 2021-01-11 RX ORDER — LISINOPRIL 20 MG/1
20 TABLET ORAL DAILY
Qty: 30 TABLET | Refills: 11 | Status: SHIPPED | OUTPATIENT
Start: 2021-01-11 | End: 2021-09-07

## 2021-01-12 ENCOUNTER — COMMUNICATION - HEALTHEAST (OUTPATIENT)
Dept: FAMILY MEDICINE | Facility: CLINIC | Age: 66
End: 2021-01-12

## 2021-03-10 ENCOUNTER — AMBULATORY - HEALTHEAST (OUTPATIENT)
Dept: VASCULAR SURGERY | Facility: CLINIC | Age: 66
End: 2021-03-10

## 2021-03-10 DIAGNOSIS — I73.9 PVD (PERIPHERAL VASCULAR DISEASE) (H): ICD-10-CM

## 2021-03-11 ENCOUNTER — RECORDS - HEALTHEAST (OUTPATIENT)
Dept: ADMINISTRATIVE | Facility: OTHER | Age: 66
End: 2021-03-11

## 2021-03-18 ENCOUNTER — COMMUNICATION - HEALTHEAST (OUTPATIENT)
Dept: VASCULAR SURGERY | Facility: CLINIC | Age: 66
End: 2021-03-18

## 2021-03-18 ENCOUNTER — AMBULATORY - HEALTHEAST (OUTPATIENT)
Dept: VASCULAR SURGERY | Facility: CLINIC | Age: 66
End: 2021-03-18

## 2021-03-18 DIAGNOSIS — I73.9 PVD (PERIPHERAL VASCULAR DISEASE) (H): ICD-10-CM

## 2021-03-18 RX ORDER — CILOSTAZOL 100 MG/1
100 TABLET ORAL 2 TIMES DAILY
Qty: 60 TABLET | Refills: 5 | Status: SHIPPED | OUTPATIENT
Start: 2021-03-18 | End: 2021-11-01

## 2021-03-30 ENCOUNTER — AMBULATORY - HEALTHEAST (OUTPATIENT)
Dept: NURSING | Facility: CLINIC | Age: 66
End: 2021-03-30

## 2021-04-20 ENCOUNTER — AMBULATORY - HEALTHEAST (OUTPATIENT)
Dept: NURSING | Facility: CLINIC | Age: 66
End: 2021-04-20

## 2021-05-26 ENCOUNTER — RECORDS - HEALTHEAST (OUTPATIENT)
Dept: ADMINISTRATIVE | Facility: CLINIC | Age: 66
End: 2021-05-26

## 2021-05-26 VITALS — HEART RATE: 76 BPM | SYSTOLIC BLOOD PRESSURE: 120 MMHG | RESPIRATION RATE: 16 BRPM | DIASTOLIC BLOOD PRESSURE: 80 MMHG

## 2021-05-26 VITALS
RESPIRATION RATE: 18 BRPM | TEMPERATURE: 98.7 F | SYSTOLIC BLOOD PRESSURE: 172 MMHG | HEART RATE: 72 BPM | DIASTOLIC BLOOD PRESSURE: 86 MMHG

## 2021-05-27 VITALS — HEART RATE: 84 BPM | SYSTOLIC BLOOD PRESSURE: 140 MMHG | RESPIRATION RATE: 14 BRPM | DIASTOLIC BLOOD PRESSURE: 80 MMHG

## 2021-05-29 NOTE — PROGRESS NOTES
Assessment and Plan:     Dung was seen today for welcome to medicare visit, leg pain, arthritis, shoulder pain and hypertension.    Claudication (H)  -     US Ankle Brachial Indices; Future    Hyperlipidemia  -     Lipid Cascade  -     Comprehensive Metabolic Panel  -     simvastatin (ZOCOR) 40 MG tablet; Take 1 tablet (40 mg total) by mouth daily.    Chronic pain of both shoulders  -     celecoxib (CELEBREX) 100 MG capsule; Take 1 capsule (100 mg total) by mouth daily.    Encounter for general adult medical examination with abnormal findings        The patient's current medical problems were reviewed.    The following high BMI interventions were performed this visit: encouragement to exercise  The following health maintenance schedule was reviewed with the patient and provided in printed form in the after visit summary:   Health Maintenance   Topic Date Due     ZOSTER VACCINES (1 of 2) 01/13/2004     PNEUMOCOCCAL POLYSACCHARIDE VACCINE AGE 65 AND OVER  09/19/2019 (Originally 1/13/2019)     COLONOSCOPY  01/19/2020     FALL RISK ASSESSMENT  06/18/2020     TD 18+ HE  07/03/2022     ADVANCE DIRECTIVES DISCUSSED WITH PATIENT  02/14/2023     INFLUENZA VACCINE RULE BASED  Completed     TDAP ADULT ONE TIME DOSE  Completed     PNEUMOCOCCAL CONJUGATE VACCINE FOR ADULTS (PCV13 OR PREVNAR)  Completed        Subjective:   Chief Complaint: Dung Negrete is an 64 y.o. male here for a Welcome to Medicare visit.   HPI: Medical history significant for hyperlipidemia, previous peripheral vascular disease requiring intervention with angioplasty on the left leg about 12 years ago, intermittent gout, acid reflux, colon polyps, benign prostatic hypertrophy with mild obstructive symptoms not on medication.    Patient reports claudication type symptoms in the right leg.  This is been gradually progressive.  Symptoms similar to previous claudication symptoms in his left leg before his intervention 12 years ago.  Discussed evaluating  this concern further.    He continues to recover from his total hip replacement.  Reports he is doing well in this regard.    Reports no acid reflux symptoms of any concern currently.    He has bilateral shoulder pain which is most consistent with degenerative changes and rotator cuff tendinopathy.  Discussed conservative measures for treatment and use of NSAIDs including specific questions he had about Celebrex.    He reports nocturia one time nightly.  Is not bothered by urinary symptoms.  Previous examination reports from prior physician indicate mild symmetric enlargement of the prostate noted.    Reviewed health history, social history and family history.  I did see him for the first time back in 2019 at which time we also reviewed this information.  Review of Systems:Please see above.  The rest of the review of systems are negative for all systems.    Patient Care Team:  Elmo Crump MD as PCP - General (Family Medicine)     Patient Active Problem List   Diagnosis     Mixed hyperlipidemia     Gout     Neck Sprain     Dyspnea     GERD (gastroesophageal reflux disease)     Polyarthralgia     Primary osteoarthritis involving multiple joints     Past Medical History:   Diagnosis Date     History of total hip arthroplasty, left 2018      No past surgical history on file.   No family history on file.   Social History     Socioeconomic History     Marital status:      Spouse name: Not on file     Number of children: Not on file     Years of education: Not on file     Highest education level: Not on file   Occupational History     Not on file   Social Needs     Financial resource strain: Not on file     Food insecurity:     Worry: Not on file     Inability: Not on file     Transportation needs:     Medical: Not on file     Non-medical: Not on file   Tobacco Use     Smoking status: Former Smoker     Last attempt to quit: 2014     Years since quittin.0     Smokeless tobacco: Former User  "    Quit date: 2/4/2005   Substance and Sexual Activity     Alcohol use: Yes     Comment: occasional on weekends     Drug use: Yes     Types: Marijuana     Sexual activity: Yes     Partners: Female   Lifestyle     Physical activity:     Days per week: Not on file     Minutes per session: Not on file     Stress: Not on file   Relationships     Social connections:     Talks on phone: Not on file     Gets together: Not on file     Attends [employer] service: Not on file     Active member of club or organization: Not on file     Attends meetings of clubs or organizations: Not on file     Relationship status: Not on file     Intimate partner violence:     Fear of current or ex partner: Not on file     Emotionally abused: Not on file     Physically abused: Not on file     Forced sexual activity: Not on file   Other Topics Concern     Not on file   Social History Narrative     Not on file       Current Outpatient Medications   Medication Sig Dispense Refill     aspirin 81 MG EC tablet Take 81 mg by mouth daily.       cholecalciferol, vitamin D3, (VITAMIN D3) 400 unit cap Take 1 capsule by mouth daily.       indomethacin (INDOCIN) 50 MG capsule Take 1 capsule (50 mg total) by mouth 3 (three) times a day with meals. 30 capsule 0     lansoprazole (PREVACID) 30 MG capsule Take 1 capsule (30 mg total) by mouth daily. 90 capsule 3     loratadine 5 mg TbDL Take 5 mg by mouth daily.       MULTIVITAMIN ORAL Take 1 tablet by mouth daily.       simvastatin (ZOCOR) 40 MG tablet Take 1 tablet (40 mg total) by mouth daily. 90 tablet 3     celecoxib (CELEBREX) 100 MG capsule Take 1 capsule (100 mg total) by mouth daily. 30 capsule 2     No current facility-administered medications for this visit.       Objective:   Vital Signs:   Visit Vitals  /78   Pulse 62   Ht 5' 11\" (1.803 m)   Wt 210 lb (95.3 kg)   SpO2 99%   BMI 29.29 kg/m       VisionScreening:   Visual Acuity Screening    Right eye Left eye Both eyes   Without correction:    "   With correction: 20/25 20/25 20/20        PHYSICAL EXAM        General Appearance:    Alert, cooperative, no distress   Eyes:   No scleral icterus or conjunctival irritation       Ears:    Normal TM's and external ear canals, both ears   Throat:   Lips, mucosa, and tongue normal; teeth and gums normal   Neck:   Supple, symmetrical, trachea midline, no adenopathy;        thyroid:  No enlargement/tenderness/nodules   Lungs:     Clear to auscultation bilaterally, respirations unlabored, no wheezes or crackles   Heart:    Regular rate and rhythm,  No murmur   Abdomen:    Soft, no distention, no tenderness on palpation, no masses, no organomegaly     Extremities:  No edema, no joint swelling or redness, no evidence of any injuries, on the right foot they do not palpate any distinct pulsations.  On the left I palpate a faint posterior tibialis pulse.   Skin:  No concerning skin findings, no suspicious moles, no rashes   Neurologic:  On gross examination there is no motor or sensory deficit.  Patient walks with a normal gait     Genitalia:   Normal testicular anatomy, no inguinal hernias, no skin findings in the genital region   Rectal:    Normal tone, no hemorrhoids masses or other anal rectal findings, prostate has a smooth uniform consistency without nodules           Assessment Results 6/18/2019   Activities of Daily Living No help needed   Instrumental Activities of Daily Living No help needed   Mini Cog Total Score 4   Some recent data might be hidden     A Mini Cog score of 0-2 suggests the possibility of dementia, score of 3-5 suggests no dementia    Identified Health Risks:     He is at risk for lack of exercise and has been provided with information to increase physical activity for the benefit of his well-being.  The patient was counseled and encouraged to consider modifying their diet and eating habits. He was provided with information on recommended healthy diet options.  Information regarding advance  directives (living degroot), including where he can download the appropriate form, was provided to the patient via the AVS.

## 2021-05-30 ENCOUNTER — RECORDS - HEALTHEAST (OUTPATIENT)
Dept: ADMINISTRATIVE | Facility: CLINIC | Age: 66
End: 2021-05-30

## 2021-05-30 VITALS — BODY MASS INDEX: 29.54 KG/M2 | WEIGHT: 211 LBS | HEIGHT: 71 IN

## 2021-05-30 VITALS — WEIGHT: 216 LBS | BODY MASS INDEX: 30.24 KG/M2 | HEIGHT: 71 IN

## 2021-05-30 NOTE — TELEPHONE ENCOUNTER
Pt called wondering if he needed to continue taking the Plavix after his angiogram on 8/23/19. Writer spoke with his wife and informed him to continue taking after the angiogram and three days prior. Then duration will be discussed at his 2 week f/u.

## 2021-05-30 NOTE — TELEPHONE ENCOUNTER
RN cannot approve Refill Request    RN can NOT refill this medication med is not covered by policy/route to provider. Last office visit: 3/19/2019 Elmo Crump MD Last Physical: 6/18/2019 Last MTM visit: Visit date not found Last visit same specialty: 3/19/2019 Elmo Crump MD.  Next visit within 3 mo: Visit date not found  Next physical within 3 mo: Visit date not found      Maryam Quiroz, [facility] Triage/Med Refill 7/11/2019    Requested Prescriptions   Pending Prescriptions Disp Refills     celecoxib (CELEBREX) 100 MG capsule [Pharmacy Med Name: CELECOXIB 100 MG CAPSULE] 30 capsule 2     Sig: TAKE 1 CAPSULE BY MOUTH EVERY DAY       There is no refill protocol information for this order

## 2021-05-30 NOTE — PROGRESS NOTES
VASCULAR SURGERY OUTPATIENT CONSULT OR VISIT   VASCULAR SURGEON: Adry Mandel MD    LOCATION:  [facility]    Dung Negrete   Medical Record #:  [id number]  YOB: 1955  Age:  64 y.o.     Date of Service: 7/18/2019    PRIMARY CARE PROVIDER: Elmo Crump MD      Reason for consultation: Evaluation of peripheral vascular disease    IMPRESSION: Patient with bilateral lower extremity claudication worse in his right leg than his left.  Remarkable durability from angiointervention with angioplasty to his left SFA 12 years ago.  In this context very much prefers an attempted simple angioplasty again for his other leg.  We had a fairly thorough discussion about supervised exercise regimens, bypass surgery, and angiointervention's and we also discussed the new FDA concerns around drug-eluting modalities.  Patient underwent a supervised exercise regimen prior to his angina intervention 12 years ago and while he did get in better shape, I did not help adequately with his walking.    RECOMMENDATION: Tentative plan for right leg angiogram possible intervention.  We will also look at left leg at the same time.  We will have him start Plavix 3 days prior to the intervention date to have a good blood level of this medication.  We will have him see his primary care physician for preop visit before hand.  Should get an ultrasound for aortic aneurysm screening sometime on his next follow-up visit.  Given family history of strokes may also benefit from carotid duplex.    HPI:  Dung Negrete is a 64 y.o. male who was seen today in consultation for claudication symptoms.  He can walk 100 yards before his right calf starts to really hurt.  His left starts hurt shortly thereafter but not as badly.  Right no rest pain.  No foot wounds.  Patient had proximal SFA angioplasty under the guidance of Dr. Steven Guadarrama 12 years ago with immediate resolution of his claudication.  At that time he was still a  smoker and was only on aspirin.  Prior to going through the intervention he says that he was talked to about supervised exercise regimens and he went through an attempt.  He feels that he did get in better shape overall but did not get enough benefit from his walking distance.  My supposition is that this was not a truly supervised regimen, but a well regimented one that he did himself.    Patient quit smoking in June 2014.  Takes baby aspirin and statin daily.  Patient had his left hip replaced 6 months ago.  Recurrent history of gout in his foot once in a while.  No prior cardiac disease.  No family history but has not been screened for aortic aneurysm disease.    Family history of significant cardiac disease with coronary artery bypass in his father and fatal MI in his brother a year ago.  Father also had strokes.    No other new health issues.          PHH:    Past Medical History:   Diagnosis Date     Cellulitis and abscess of leg 8/31/2006     Dyspnea 5/27/2015     Enthesopathy of knee 8/31/2006     GERD (gastroesophageal reflux disease) 1/8/2017     Health examination of defined subpopulation 12/27/2005     History of total hip arthroplasty, left 12/28/2018     Mixed hyperlipidemia     Created by Conversion      Neck Sprain     Created by Conversion      Polyarthralgia 5/17/2017     Primary osteoarthritis involving multiple joints 5/17/2017        Past Surgical History:   Procedure Laterality Date     ANGIOPLASTY Left 2007     TOTAL HIP ARTHROPLASTY Left 12/28/2019     TOTAL KNEE ARTHROPLASTY Right 04/13/2016       ALLERGIES:  Patient has no known allergies.    MEDS:    Current Outpatient Medications:      aspirin 81 MG EC tablet, Take 81 mg by mouth daily., Disp: , Rfl:      celecoxib (CELEBREX) 100 MG capsule, TAKE 1 CAPSULE BY MOUTH EVERY DAY, Disp: 30 capsule, Rfl: 2     cholecalciferol, vitamin D3, (VITAMIN D3) 400 unit cap, Take 1 capsule by mouth daily., Disp: , Rfl:      indomethacin (INDOCIN) 50 MG  "capsule, Take 1 capsule (50 mg total) by mouth 3 (three) times a day with meals., Disp: 30 capsule, Rfl: 0     lansoprazole (PREVACID) 30 MG capsule, Take 1 capsule (30 mg total) by mouth daily., Disp: 90 capsule, Rfl: 3     loratadine 5 mg TbDL, Take 5 mg by mouth daily., Disp: , Rfl:      MULTIVITAMIN ORAL, Take 1 tablet by mouth daily., Disp: , Rfl:      simvastatin (ZOCOR) 40 MG tablet, Take 1 tablet (40 mg total) by mouth daily., Disp: 90 tablet, Rfl: 3    SOCIAL HABITS:    Social History     Tobacco Use   Smoking Status Former Smoker     Packs/day: 1.00     Years: 39.00     Pack years: 39.00     Types: Cigarettes     Last attempt to quit: 2014     Years since quittin.1   Smokeless Tobacco Former User     Quit date: 2005       Social History     Substance and Sexual Activity   Alcohol Use Yes    Comment: occasional on weekends       Social History     Substance and Sexual Activity   Drug Use Yes     Types: Marijuana       FAMILY HISTORY:    Family History   Problem Relation Age of Onset     No Medical Problems Mother      Heart disease Father      Hypertension Father      Diabetes Sister      Heart disease Brother      Diabetes Brother      Heart attack Brother      Diabetes Sister        REVIEW OF SYSTEMS:    A 12 point ROS was reviewed and except for what is listed in the HPI above, all others are negative    PE:  /86   Pulse 68   Temp 98.3  F (36.8  C) (Oral)   Resp 16   Ht 5' 11\" (1.803 m) Comment: per pt report  Wt 212 lb (96.2 kg) Comment: per pt report  BMI 29.57 kg/m    Wt Readings from Last 1 Encounters:   19 212 lb (96.2 kg)     Body mass index is 29.57 kg/m .    EXAM:  GENERAL: This is a well-developed 64 y.o. male who appears his stated age  EYES: Grossly normal.  MOUTH: Buccal mucosa normal   CARDIAC:  Not assessed  CHEST/LUNG:  Not Assessed  GASTROINTESINAL (ABDOMEN):Not Assessed   MUSCULOSKELETAL: Grossly normal and both lower extremities are intact.  HEME/LYMPH: No " lymphedema  NEUROLOGIC: Focally intact, Alert and oriented x 3.   PSYCH: appropriate affect  INTEGUMENT: No open lesions or ulcers      DIAGNOSTIC STUDIES:     Images:  Us Arterial Pressures With Exercise Legs Bilateral    Result Date: 6/25/2019  EXAM: RESTING AND POSTEXERCISE ANKLE BRACHIAL INDICES (ABIs) LOCATION: [facility] DATE: 6/25/2019 INDICATION: History of angioplasty, peripheral arterial disease. COMPARISON: None. MACK FINDINGS: SEGMENTAL BP RIGHT (mmHg) Brachial: 162 High Thigh: 145; Index 0.81 Low Thigh: 92; Index 0.52 Calf: 88; Index: 0.49 Ankle (PT): 107; Index 0.60 Ankle (DP): 87; Index 0.49 Digit: NPD; Index NC LEFT (mmHg) Brachial: 178 High Thigh: 187; Index 1.05 Low Thigh: 175; Index 0.98 Calf: 143; Index 0.80 Ankle (PT): 155; Index 0.87 Ankle (DP): 149; Index 0.84 Digit: 102; Index 0.57 The patient was exercised on a treadmill at 1.5 mph at a 10% incline for 5 minutes total. Patient experienced bilateral calf pain at 2 minutes Resting and immediate postexercise ABIs are 0.60 and 0.27 on the right. Resting and immediate postexercise ABIs are 0.87 and 0.59 on the left. WAVEFORMS: Abnormal ankle waveforms bilaterally. Abnormal right calf waveform. Abnormal digital waveforms bilaterally.     1. RIGHT LOWER EXTREMITY: MACK at rest is abnormal, measuring 0.60. This corresponds with moderate peripheral vascular disease. There is an abnormal response to exercise suggesting exercise-induced ischemia. Waveform and pressure evaluation suggests the presence of disease in the SFA and popliteal artery. Abnormal digital arterial waveform is also appreciated. Can consider Doppler arterial ultrasound or CTA for further evaluation. 2. LEFT LOWER EXTREMITY: MACK at rest is abnormal, measuring 0.87. This corresponds with mild peripheral vascular disease. There is an abnormal response to exercise suggesting exercise-induced ischemia. Abnormal TBI and digital waveform.      I personally  reviewed the images and my interpretation is that his ABIs are in the claudication range bilaterally and segmental pressures suggest SFA disease on the right..    LABS:      Sodium   Date Value Ref Range Status   06/18/2019 142 136 - 145 mmol/L Final   12/05/2018 139 136 - 145 mmol/L Final   02/14/2018 140 136 - 145 mmol/L Final     Potassium   Date Value Ref Range Status   06/18/2019 4.4 3.5 - 5.0 mmol/L Final   12/05/2018 4.4 3.5 - 5.0 mmol/L Final   02/14/2018 4.4 3.5 - 5.0 mmol/L Final     Chloride   Date Value Ref Range Status   06/18/2019 107 98 - 107 mmol/L Final   12/05/2018 103 98 - 107 mmol/L Final   02/14/2018 104 98 - 107 mmol/L Final     BUN   Date Value Ref Range Status   06/18/2019 16 8 - 22 mg/dL Final   12/05/2018 20 8 - 22 mg/dL Final   02/14/2018 20 8 - 22 mg/dL Final     Creatinine   Date Value Ref Range Status   06/18/2019 0.85 0.70 - 1.30 mg/dL Final   12/05/2018 0.87 0.70 - 1.30 mg/dL Final   02/14/2018 0.81 0.70 - 1.30 mg/dL Final     Hemoglobin   Date Value Ref Range Status   12/05/2018 14.5 14.0 - 18.0 g/dL Final   02/14/2018 15.5 14.0 - 18.0 g/dL Final   05/17/2017 13.9 (L) 14.0 - 18.0 g/dL Final     Platelets   Date Value Ref Range Status   12/05/2018 181 140 - 440 thou/uL Final   02/14/2018 166 140 - 440 thou/uL Final   05/17/2017 158 140 - 440 thou/uL Final     INR   Date Value Ref Range Status   03/30/2016 1.00 0.90 - 1.10 Final         Adry Mandel MD  VASCULAR SURGERY

## 2021-05-31 ENCOUNTER — RECORDS - HEALTHEAST (OUTPATIENT)
Dept: ADMINISTRATIVE | Facility: CLINIC | Age: 66
End: 2021-05-31

## 2021-05-31 VITALS — WEIGHT: 206 LBS | BODY MASS INDEX: 28.84 KG/M2 | HEIGHT: 71 IN

## 2021-05-31 VITALS — BODY MASS INDEX: 28.73 KG/M2 | WEIGHT: 206 LBS

## 2021-05-31 NOTE — OP NOTE
[facility] Interventional Radiology vascular surgery procedure      Date: 08/25/19    Procedures Performed:  Ir Extremity Angiogram Right from the aorta level    Selective right leg angiogram with distalmost catheter position and greater than third order popliteal artery    Angioplasty right superficial femoral artery and popliteal artery    Completion angiogram    Left leg selective angiogram    Ultrasound-guided vascular access    Provider:  Adry Mandel MD     Assistants:    Darryl Diaz NP.  Note that Darryl was present for the entire procedure and was involved in the closure of the access site.    Maribell Olivarez MD.  Vascular surgery fellow involved in the entire operation.    Indication:  Is a 64-year-old gentleman with short distance claudication who has tried and failed supervised exercise regimen.  He had successful angioplasty of his left SFA in 2005 and this remains asymptomatic for him.  In this context would very much like an attempt at the same for the right leg.  On a statin and dual antiplatelet therapy.    Sedation:  Moderate Sedation: The procedure was performed with administration of intravenous conscious sedation with appropriate preoperative, intraoperative, and postoperative evaluation.  75 minutes of supervised face to face intraservice time was provided by a radiology nurse under my direct supervision.  Versed 4 mg and fentanyl 200 mcg given.    Antibiotics:  None    Fluoroscopic Time:  12.9 Minutes    Radiation Dose:  621 mGy    Contrast:  40 cc Visipaque    Procedure:    Ultrasound was used to evaluate the right groin.  The selected vessel was the right common femoral artery. Ultrasound was then used for real-time ultrasound guided needle entry of the the right common femoral artery. Permanent images were recorded and saved to the patient's medical record.    Micropuncture technique was used and a 4 Danish sheath was exchanged in.  Omni Flush catheter was advanced the lower abdominal  aorta and an aortogram was performed.    Findings: Widely patent lower abdominal aorta, common iliac arteries, external iliac arteries, and internal iliac arteries without disease.  Patent common femoral arteries without disease.    Wire catheter advanced into the stump of the right SFA and selective right leg angiogram was performed    Findings: Patent profunda femoris artery.  SFA origin widely patent but severe stenoses of up to 90% with multiple focal narrowings all the way down to and below Glenn's canal.  Beyond the popliteal arteries widely patent with three-vessel tibial runoff.  Dominant flow is posterior tibial artery to the foot.  The SFA disease equates to a TASC B lesion.    At this point over stiff angle Glidewire a 6 Macedonian Ansell sheath was exchanged in.  The patient was heparinized when ACT of 235.  A straight stiff Glidewire and CXI catheter were then used to traverse all the stenoses in the SFA.  CXI catheter was advanced to the popliteal level and an angiogram was performed    Findings: True lumen location of the CXI catheter within the above-knee popliteal artery.  A Louis wire was now exchanged in.  Over the Louis wire a 5 mm x 200 mm Sherrie plasty balloon was exchanged in.  2 separate inflations were required to treat the entire area of disease.  Each inflation was for 2 minutes.  The more distal inflation was 6 anil of pressure in the more proximal position was to 8 anil of pressure.  A completion angiogram was then performed from the sheath level    Findings: No residual SFA stenosis.  No evidence of dissection.  No evidence of distal embolization.     The sheath was pulled back to the left external iliac artery level and a left leg angiogram was performed.    Findings: Patent profunda Bruno artery and SFA.  SFA has Glenn's canal region of disease of approximately 30%.  Patent popliteal artery with three-vessel tibial runoff.    The sheath was now removed and a 6 Macedonian Angio-Seal was used  for access site closure.    Completion duplex was performed showing good positioning of the Angio-Seal in the anterior wall of the artery and widely patent flow through common femoral artery.  There is no flow luminal compromise, active extravasation, pseudoaneurysm formation.        Impression:  Successful right leg SFA angiogram plasty      Adry Mandel  Vascular Surgery

## 2021-05-31 NOTE — TELEPHONE ENCOUNTER
Patient called back inquiring if it is okay to take his aspirin before his angiogram. Let him know this is okay. Also reviewed instructions regarding Plavix. Left message due to no answer. Instructed to call clinic with further questions.

## 2021-05-31 NOTE — TELEPHONE ENCOUNTER
Left voice message about reminder for tomorrow's procedure with Dr. Mandel. Told to call back if he has questions on prep instructions.     Procedure: Right leg Angiogram     Date: 8/23/19     Arrival Time: 9am     Procedure Time: 10am     Location: [facility]       Do not eat or drink anything after midnight before your procedure.    START PLAVIX 3 DAYS PRIOR TO ANGIO.

## 2021-05-31 NOTE — TELEPHONE ENCOUNTER
Writer called pt to see how he was doing post angiogram. He is doing OK, he stated that he is a little sore. Writer discussed f/u appt with patient and explained that aorta and carotid US. He had no further questions.

## 2021-05-31 NOTE — PRE-PROCEDURE
Written consent obtained?: Yes  Risks and benefits: Risks, benefits and alternatives were discussed  Consent given by: patient  Expected level of sedation: moderate  ASA Class: Class 1- healthy patient  Mallampati: Grade 1- soft palate, uvula, tonsillar pillars, and posterior pharyngeal wall visible  Patient states understanding of procedure being performed: Yes  Patient's understanding of procedure matches consent: Yes  Appropriately NPO: yes  Lungs: lungs clear with good breath sounds bilaterally  Heart: normal heart sounds and rate  History & Physical reviewed: History and physical reviewed and no updates needed  Statement of review: I have reviewed the lab findings, diagnostic data, medications, and the plan for sedation

## 2021-05-31 NOTE — H&P (VIEW-ONLY)
Preoperative Exam    Scheduled Procedure: angiogram  Surgery Date:  8/23/19  Surgery Location: [facility], fax [phone]    Surgeon:  Dr Mandel    Assessment/Plan:     Dung was seen today for pre-op exam.    Preop general physical exam  -     Electrocardiogram Perform and Read  -     HM2(CBC w/o Differential)    PVD (peripheral vascular disease) (H)  -     Electrocardiogram Perform and Read    Hyperlipidemia, unspecified hyperlipidemia type    Claudication (H)      Surgical Procedure Risk: Low (reported cardiac risk generally < 1%)  Have you had prior anesthesia?: Yes  Have you or any family members had a previous anesthesia reaction:  No  Do you or any family members have a history of a clotting or bleeding disorder?: No  Cardiac Risk Assessment: no increased risk for major cardiac complications    APPROVAL GIVEN to proceed with proposed procedure, without further diagnostic evaluation    Functional Status: Independent  Patient plans to recover at home with family.     Subjective:      Dung Negrete is a 64 y.o. male who presents for a preoperative consultation.      He has a history of hyperlipidemia, known peripheral vascular disease with an intervention 12 years ago in the left leg, intermittent gout, acid reflux, colon polyps, and benign prostatic hypertrophy.    At his recent physical exam in June patient complained of claudication symptoms and with his history he was sent for reevaluation at the vascular clinic.  The recommendation is to proceed with angiography at this time to further evaluate his known peripheral vascular disease.    He reports no signs or symptoms of an acute illness.    He has no prior problems with anesthesia or procedures.  He has no history of a blood clot or a bleeding disorder.    All other systems reviewed and are negative, other than those listed in the HPI.    Pertinent History  Do you have difficulty breathing or chest pain after walking up a flight of  stairs: No  History of obstructive sleep apnea: No  Steroid use in the last 6 months: No  Frequent Aspirin/NSAID use: aspirin 81mg will start plavix 3 days before surgery  Prior Blood Transfusion: No  Prior Blood Transfusion Reaction: No  If for some reason prior to, during or after the procedure, if it is medically indicated, would you be willing to have a blood transfusion?:  There is no transfusion refusal.    Current Outpatient Medications   Medication Sig Dispense Refill     aspirin 81 MG EC tablet Take 81 mg by mouth daily.       celecoxib (CELEBREX) 100 MG capsule TAKE 1 CAPSULE BY MOUTH EVERY DAY 30 capsule 2     cholecalciferol, vitamin D3, (VITAMIN D3) 400 unit cap Take 1 capsule by mouth daily.       clopidogrel (PLAVIX) 75 mg tablet Take 1 tablet (75 mg total) by mouth daily. 90 tablet 0     indomethacin (INDOCIN) 50 MG capsule Take 1 capsule (50 mg total) by mouth 3 (three) times a day with meals. 30 capsule 0     lansoprazole (PREVACID) 30 MG capsule Take 1 capsule (30 mg total) by mouth daily. 90 capsule 3     loratadine 5 mg TbDL Take 5 mg by mouth daily.       MULTIVITAMIN ORAL Take 1 tablet by mouth daily.       simvastatin (ZOCOR) 40 MG tablet Take 1 tablet (40 mg total) by mouth daily. 90 tablet 3     No current facility-administered medications for this visit.         No Known Allergies    Patient Active Problem List   Diagnosis     Mixed hyperlipidemia     Gout     Neck Sprain     Dyspnea     GERD (gastroesophageal reflux disease)     Polyarthralgia     Primary osteoarthritis involving multiple joints     Cellulitis and abscess of leg     Enthesopathy of knee     Health examination of defined subpopulation       Past Medical History:   Diagnosis Date     Cellulitis and abscess of leg 8/31/2006     Dyspnea 5/27/2015     Enthesopathy of knee 8/31/2006     GERD (gastroesophageal reflux disease) 1/8/2017     Health examination of defined subpopulation 12/27/2005     History of total hip  arthroplasty, left 2018     Mixed hyperlipidemia     Created by Conversion      Neck Sprain     Created by Conversion      Polyarthralgia 2017     Primary osteoarthritis involving multiple joints 2017       Past Surgical History:   Procedure Laterality Date     ANGIOPLASTY Left 2007     TOTAL HIP ARTHROPLASTY Left 2019     TOTAL KNEE ARTHROPLASTY Right 2016       Social History     Socioeconomic History     Marital status:      Spouse name: Not on file     Number of children: Not on file     Years of education: Not on file     Highest education level: Not on file   Occupational History     Not on file   Social Needs     Financial resource strain: Not on file     Food insecurity:     Worry: Not on file     Inability: Not on file     Transportation needs:     Medical: Not on file     Non-medical: Not on file   Tobacco Use     Smoking status: Former Smoker     Packs/day: 1.00     Years: 39.00     Pack years: 39.00     Types: Cigarettes     Last attempt to quit: 2014     Years since quittin.2     Smokeless tobacco: Former User     Quit date: 2005   Substance and Sexual Activity     Alcohol use: Yes     Comment: occasional on weekends     Drug use: Yes     Types: Marijuana     Sexual activity: Yes     Partners: Female   Lifestyle     Physical activity:     Days per week: Not on file     Minutes per session: Not on file     Stress: Not on file   Relationships     Social connections:     Talks on phone: Not on file     Gets together: Not on file     Attends Cheondoism service: Not on file     Active member of club or organization: Not on file     Attends meetings of clubs or organizations: Not on file     Relationship status: Not on file     Intimate partner violence:     Fear of current or ex partner: Not on file     Emotionally abused: Not on file     Physically abused: Not on file     Forced sexual activity: Not on file   Other Topics Concern     Not on file   Social History  Narrative     Not on file     Patient Care Team:  Elmo Crump MD as PCP - General (Family Medicine)    Objective:     There were no vitals filed for this visit.    Physical Exam:    General Appearance:    Alert, cooperative, no distress   Eyes:   No scleral icterus or conjunctival irritation       Ears:    Normal TM's and external ear canals, both ears   Throat:   Lips, mucosa, and tongue normal; teeth and gums normal   Neck:   Supple, symmetrical, trachea midline, no adenopathy;        thyroid:  No enlargement/tenderness/nodules   Lungs:     Clear to auscultation bilaterally, respirations unlabored, no wheezes or crackles   Heart:    Regular rate and rhythm,  No murmur   Abdomen:    Soft, no distention, no tenderness on palpation, no masses, no organomegaly     Extremities:  No edema, no joint swelling or redness, no evidence of any injuries   Skin:  No concerning skin findings, no suspicious moles, no rashes   Neurologic:  On gross examination there is no motor or sensory deficit.  Patient walks with a normal gait       Recent Results (from the past 240 hour(s))   HM2(CBC w/o Differential)    Collection Time: 08/13/19  9:09 AM   Result Value Ref Range    WBC 6.6 4.0 - 11.0 thou/uL    RBC 5.03 4.40 - 6.20 mill/uL    Hemoglobin 15.4 14.0 - 18.0 g/dL    Hematocrit 44.7 40.0 - 54.0 %    MCV 89 80 - 100 fL    MCH 30.6 27.0 - 34.0 pg    MCHC 34.5 32.0 - 36.0 g/dL    RDW 11.8 11.0 - 14.5 %    Platelets 150 140 - 440 thou/uL    MPV 8.3 7.0 - 10.0 fL   Electrocardiogram Perform and Read    Collection Time: 08/13/19  9:16 AM   Result Value Ref Range    SYSTOLIC BLOOD PRESSURE  mmHg    DIASTOLIC BLOOD PRESSURE  mmHg    VENTRICULAR RATE 64 BPM    ATRIAL RATE 64 BPM    P-R INTERVAL 150 ms    QRS DURATION 90 ms    Q-T INTERVAL 398 ms    QTC CALCULATION (BEZET) 410 ms    P Axis 81 degrees    R AXIS 14 degrees    T AXIS 23 degrees    MUSE DIAGNOSIS       Sinus rhythm with marked sinus arrhythmia  Otherwise normal ECG  When  compared with ECG of 05-DEC-2018 10:04,  No significant change was found         Immunization History   Administered Date(s) Administered     DT (pediatric) 08/09/1998     Hep A, historic 08/25/2010, 07/03/2012     Influenza, seasonal,quad inj 36+ mos 02/14/2018     Influenza,inj,MDCK,PF,Quad >4yrs 10/29/2018     Pneumo Conj 13-V (2010&after) 03/19/2019     Td,adult,historic,unspecified 08/09/1998     Tdap 07/03/2012       Electronically signed by Elmo Crump MD 08/13/19 7:49 AM

## 2021-05-31 NOTE — SEDATION DOCUMENTATION
Dr Olivarez and Dr Mandel informed of pts last two BP's, elevated above baseline.  Pt denies any pain at present, is relaxed and comfortable.  NSR.  No distress.

## 2021-05-31 NOTE — PROGRESS NOTES
Preoperative Exam    Scheduled Procedure: angiogram  Surgery Date:  8/23/19  Surgery Location: [facility], fax [phone]    Surgeon:  Dr Mandel    Assessment/Plan:     Dung was seen today for pre-op exam.    Preop general physical exam  -     Electrocardiogram Perform and Read  -     HM2(CBC w/o Differential)    PVD (peripheral vascular disease) (H)  -     Electrocardiogram Perform and Read    Hyperlipidemia, unspecified hyperlipidemia type    Claudication (H)      Surgical Procedure Risk: Low (reported cardiac risk generally < 1%)  Have you had prior anesthesia?: Yes  Have you or any family members had a previous anesthesia reaction:  No  Do you or any family members have a history of a clotting or bleeding disorder?: No  Cardiac Risk Assessment: no increased risk for major cardiac complications    APPROVAL GIVEN to proceed with proposed procedure, without further diagnostic evaluation    Functional Status: Independent  Patient plans to recover at home with family.     Subjective:      Dung Negrete is a 64 y.o. male who presents for a preoperative consultation.      He has a history of hyperlipidemia, known peripheral vascular disease with an intervention 12 years ago in the left leg, intermittent gout, acid reflux, colon polyps, and benign prostatic hypertrophy.    At his recent physical exam in June patient complained of claudication symptoms and with his history he was sent for reevaluation at the vascular clinic.  The recommendation is to proceed with angiography at this time to further evaluate his known peripheral vascular disease.    He reports no signs or symptoms of an acute illness.    He has no prior problems with anesthesia or procedures.  He has no history of a blood clot or a bleeding disorder.    All other systems reviewed and are negative, other than those listed in the HPI.    Pertinent History  Do you have difficulty breathing or chest pain after walking up a flight of  stairs: No  History of obstructive sleep apnea: No  Steroid use in the last 6 months: No  Frequent Aspirin/NSAID use: aspirin 81mg will start plavix 3 days before surgery  Prior Blood Transfusion: No  Prior Blood Transfusion Reaction: No  If for some reason prior to, during or after the procedure, if it is medically indicated, would you be willing to have a blood transfusion?:  There is no transfusion refusal.    Current Outpatient Medications   Medication Sig Dispense Refill     aspirin 81 MG EC tablet Take 81 mg by mouth daily.       celecoxib (CELEBREX) 100 MG capsule TAKE 1 CAPSULE BY MOUTH EVERY DAY 30 capsule 2     cholecalciferol, vitamin D3, (VITAMIN D3) 400 unit cap Take 1 capsule by mouth daily.       clopidogrel (PLAVIX) 75 mg tablet Take 1 tablet (75 mg total) by mouth daily. 90 tablet 0     indomethacin (INDOCIN) 50 MG capsule Take 1 capsule (50 mg total) by mouth 3 (three) times a day with meals. 30 capsule 0     lansoprazole (PREVACID) 30 MG capsule Take 1 capsule (30 mg total) by mouth daily. 90 capsule 3     loratadine 5 mg TbDL Take 5 mg by mouth daily.       MULTIVITAMIN ORAL Take 1 tablet by mouth daily.       simvastatin (ZOCOR) 40 MG tablet Take 1 tablet (40 mg total) by mouth daily. 90 tablet 3     No current facility-administered medications for this visit.         No Known Allergies    Patient Active Problem List   Diagnosis     Mixed hyperlipidemia     Gout     Neck Sprain     Dyspnea     GERD (gastroesophageal reflux disease)     Polyarthralgia     Primary osteoarthritis involving multiple joints     Cellulitis and abscess of leg     Enthesopathy of knee     Health examination of defined subpopulation       Past Medical History:   Diagnosis Date     Cellulitis and abscess of leg 8/31/2006     Dyspnea 5/27/2015     Enthesopathy of knee 8/31/2006     GERD (gastroesophageal reflux disease) 1/8/2017     Health examination of defined subpopulation 12/27/2005     History of total hip  arthroplasty, left 2018     Mixed hyperlipidemia     Created by Conversion      Neck Sprain     Created by Conversion      Polyarthralgia 2017     Primary osteoarthritis involving multiple joints 2017       Past Surgical History:   Procedure Laterality Date     ANGIOPLASTY Left 2007     TOTAL HIP ARTHROPLASTY Left 2019     TOTAL KNEE ARTHROPLASTY Right 2016       Social History     Socioeconomic History     Marital status:      Spouse name: Not on file     Number of children: Not on file     Years of education: Not on file     Highest education level: Not on file   Occupational History     Not on file   Social Needs     Financial resource strain: Not on file     Food insecurity:     Worry: Not on file     Inability: Not on file     Transportation needs:     Medical: Not on file     Non-medical: Not on file   Tobacco Use     Smoking status: Former Smoker     Packs/day: 1.00     Years: 39.00     Pack years: 39.00     Types: Cigarettes     Last attempt to quit: 2014     Years since quittin.2     Smokeless tobacco: Former User     Quit date: 2005   Substance and Sexual Activity     Alcohol use: Yes     Comment: occasional on weekends     Drug use: Yes     Types: Marijuana     Sexual activity: Yes     Partners: Female   Lifestyle     Physical activity:     Days per week: Not on file     Minutes per session: Not on file     Stress: Not on file   Relationships     Social connections:     Talks on phone: Not on file     Gets together: Not on file     Attends Confucianism service: Not on file     Active member of club or organization: Not on file     Attends meetings of clubs or organizations: Not on file     Relationship status: Not on file     Intimate partner violence:     Fear of current or ex partner: Not on file     Emotionally abused: Not on file     Physically abused: Not on file     Forced sexual activity: Not on file   Other Topics Concern     Not on file   Social History  Narrative     Not on file     Patient Care Team:  Elmo Crump MD as PCP - General (Family Medicine)    Objective:     There were no vitals filed for this visit.    Physical Exam:    General Appearance:    Alert, cooperative, no distress   Eyes:   No scleral icterus or conjunctival irritation       Ears:    Normal TM's and external ear canals, both ears   Throat:   Lips, mucosa, and tongue normal; teeth and gums normal   Neck:   Supple, symmetrical, trachea midline, no adenopathy;        thyroid:  No enlargement/tenderness/nodules   Lungs:     Clear to auscultation bilaterally, respirations unlabored, no wheezes or crackles   Heart:    Regular rate and rhythm,  No murmur   Abdomen:    Soft, no distention, no tenderness on palpation, no masses, no organomegaly     Extremities:  No edema, no joint swelling or redness, no evidence of any injuries   Skin:  No concerning skin findings, no suspicious moles, no rashes   Neurologic:  On gross examination there is no motor or sensory deficit.  Patient walks with a normal gait       Recent Results (from the past 240 hour(s))   HM2(CBC w/o Differential)    Collection Time: 08/13/19  9:09 AM   Result Value Ref Range    WBC 6.6 4.0 - 11.0 thou/uL    RBC 5.03 4.40 - 6.20 mill/uL    Hemoglobin 15.4 14.0 - 18.0 g/dL    Hematocrit 44.7 40.0 - 54.0 %    MCV 89 80 - 100 fL    MCH 30.6 27.0 - 34.0 pg    MCHC 34.5 32.0 - 36.0 g/dL    RDW 11.8 11.0 - 14.5 %    Platelets 150 140 - 440 thou/uL    MPV 8.3 7.0 - 10.0 fL   Electrocardiogram Perform and Read    Collection Time: 08/13/19  9:16 AM   Result Value Ref Range    SYSTOLIC BLOOD PRESSURE  mmHg    DIASTOLIC BLOOD PRESSURE  mmHg    VENTRICULAR RATE 64 BPM    ATRIAL RATE 64 BPM    P-R INTERVAL 150 ms    QRS DURATION 90 ms    Q-T INTERVAL 398 ms    QTC CALCULATION (BEZET) 410 ms    P Axis 81 degrees    R AXIS 14 degrees    T AXIS 23 degrees    MUSE DIAGNOSIS       Sinus rhythm with marked sinus arrhythmia  Otherwise normal ECG  When  compared with ECG of 05-DEC-2018 10:04,  No significant change was found         Immunization History   Administered Date(s) Administered     DT (pediatric) 08/09/1998     Hep A, historic 08/25/2010, 07/03/2012     Influenza, seasonal,quad inj 36+ mos 02/14/2018     Influenza,inj,MDCK,PF,Quad >4yrs 10/29/2018     Pneumo Conj 13-V (2010&after) 03/19/2019     Td,adult,historic,unspecified 08/09/1998     Tdap 07/03/2012       Electronically signed by Elmo Crump MD 08/13/19 7:49 AM

## 2021-06-01 VITALS — WEIGHT: 212 LBS | HEIGHT: 71 IN | BODY MASS INDEX: 29.68 KG/M2

## 2021-06-01 NOTE — PROGRESS NOTES
2WKS after right leg angio 8/23/19. For claudication. Walking has improved.  Hx of angiointervention with angioplasty to his left SFA. Should get an ultrasound for aortic aneurysm screening sometime.  Given family history of strokes may also benefit from carotid duplex.

## 2021-06-01 NOTE — PROGRESS NOTES
VASCULAR SURGERY OUTPATIENT CONSULT OR VISIT   VASCULAR SURGEON: Adry Mandel MD    LOCATION:  [facility]    Dung Negrete   Medical Record #:  [id number]  YOB: 1955  Age:  64 y.o.     Date of Service: 9/4/2019    PRIMARY CARE PROVIDER: Elmo Crump MD      Reason for consultation: Patient seen in follow-up after left leg Sherrie intervention as well as after testing based on known cardiovascular risk factors    IMPRESSION: Successful right SFA angiointervention with complete resolution of right leg claudication.  Duplex shows widely patent flow through the SFA with only mild residual narrowing and normal ABIs.  On De La Paz Newman testing patient is now limited only by his left calf which we have demonstrated has at most a 30 to 50% stenosis after angiointervention in 2004.  On dual antiplatelet therapy and very happy with his outcome.    Separate from this the patient had carotid duplex based on very strong family history of carotid disease with stroke in his father and carotid endarterectomy his brother.  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  This needs to be confirmed with CTA.  The other carotid is widely patent.    Ultrasound for AAA given that he has a history of smoking demonstrates a small AAA in the super renal aorta 3.5 cm.  We cannot definitively say if this is a focal aneurysm or if this the distal extent of a larger thoracic aneurysm.    RECOMMENDATION: Overall excellent prognosis given his risk factor optimization and that he does not smoke.  Excellent technical outcome from right leg Sherrie intervention, and given his long durability from left leg intervention that we might expect similar long-term durability although this needs to be carefully verified.    If his left carotid is indeed completely occluded, it poses no risk of stroke not need intervention.  That said, patients with carotid  occlusion have a 4 %/year risk of stroke and so ongoing risk factor modification eluding statin use, antiplatelet therapy, and blood pressure control will remain paramount.    Needed to confirm that the left internal carotid artery is indeed occluded and so we are sent him for a CTA after clinic today.  CTA confirmed occlusion of the left carotid artery.    Aortic aneurysm needs to be confirmed to not involve the thoracic aorta but otherwise is of no important risk of rupture, needing only surveillance.    Return visit in 6 months time with repeat carotid duplex, repeat De La Paz Newman testing and right leg arterial duplex, CT chest abdomen pelvis without contrast to look at aneurysmal disease.    HPI:  Dung Negrete is a 64 y.o. male who was seen today in follow-up for his vascular disease.  Underwent successful right leg of a angioplasty by us 2 weeks ago with immediate results.  No longer having claudication clinically.  He was that he can walk without limitation.  Compliant with his statin and dual antiplatelet therapy.    Patient has not had symptoms of amaurosis fugax or TIA but his father did have strokes and his brother did have prior carotid endarterectomy.    Not having symptoms consistent with aortic aneurysm disease.    No other new health issues.    PHH:    Past Medical History:   Diagnosis Date     Cellulitis and abscess of leg 8/31/2006     Dyspnea 5/27/2015     Enthesopathy of knee 8/31/2006     GERD (gastroesophageal reflux disease) 1/8/2017     Health examination of defined subpopulation 12/27/2005     History of total hip arthroplasty, left 12/28/2018     Mixed hyperlipidemia     Created by Conversion      Neck Sprain     Created by Conversion      Polyarthralgia 5/17/2017     Primary osteoarthritis involving multiple joints 5/17/2017        Past Surgical History:   Procedure Laterality Date     ANGIOPLASTY Left 2007     IR EXTREMITY ANGIOGRAM RIGHT  8/23/2019     TOTAL HIP ARTHROPLASTY Left  2019     TOTAL KNEE ARTHROPLASTY Right 2016       ALLERGIES:  Patient has no known allergies.    MEDS:    Current Outpatient Medications:      aspirin 81 MG EC tablet, Take 81 mg by mouth daily., Disp: , Rfl:      celecoxib (CELEBREX) 100 MG capsule, TAKE 1 CAPSULE BY MOUTH EVERY DAY, Disp: 30 capsule, Rfl: 2     cholecalciferol, vitamin D3, (VITAMIN D3) 400 unit cap, Take 1 capsule by mouth daily., Disp: , Rfl:      clopidogrel (PLAVIX) 75 mg tablet, Take 1 tablet (75 mg total) by mouth daily., Disp: 90 tablet, Rfl: 0     indomethacin (INDOCIN) 50 MG capsule, Take 1 capsule (50 mg total) by mouth 3 (three) times a day with meals., Disp: 30 capsule, Rfl: 0     lansoprazole (PREVACID) 30 MG capsule, Take 1 capsule (30 mg total) by mouth daily., Disp: 90 capsule, Rfl: 3     loratadine 5 mg TbDL, Take 5 mg by mouth daily., Disp: , Rfl:      MULTIVITAMIN ORAL, Take 1 tablet by mouth daily., Disp: , Rfl:      simvastatin (ZOCOR) 40 MG tablet, Take 1 tablet (40 mg total) by mouth daily., Disp: 90 tablet, Rfl: 3    SOCIAL HABITS:    Social History     Tobacco Use   Smoking Status Former Smoker     Packs/day: 1.00     Years: 39.00     Pack years: 39.00     Types: Cigarettes     Last attempt to quit: 2014     Years since quittin.2   Smokeless Tobacco Former User     Quit date: 2005       Social History     Substance and Sexual Activity   Alcohol Use Yes    Comment: occasional on weekends       Social History     Substance and Sexual Activity   Drug Use Yes     Types: Marijuana       FAMILY HISTORY:    Family History   Problem Relation Age of Onset     No Medical Problems Mother      Heart disease Father      Hypertension Father      Diabetes Sister      Heart disease Brother      Diabetes Brother      Heart attack Brother      Diabetes Sister        REVIEW OF SYSTEMS:    A 12 point ROS was reviewed and except for what is listed in the HPI above, all others are negative    PE:  /80   Pulse 76    Resp 16   Wt Readings from Last 1 Encounters:   08/23/19 208 lb (94.3 kg)     There is no height or weight on file to calculate BMI.    EXAM:  GENERAL: This is a well-developed 64 y.o. male who appears his stated age  EYES: Grossly normal.  MOUTH: Buccal mucosa normal   MUSCULOSKELETAL: Grossly normal and both lower extremities are intact.  HEME/LYMPH: No lymphedema  NEUROLOGIC: Focally intact, Alert and oriented x 3.   PSYCH: appropriate affect  INTEGUMENT: No open lesions or ulcers        DIAGNOSTIC STUDIES:     Images:  Ir Extremity Angiogram Right    Result Date: 8/25/2019  [facility] Interventional Radiology vascular surgery procedure Date: 08/25/19 Procedures Performed: Ir Extremity Angiogram Right from the aorta level   Selective right leg angiogram with distalmost catheter position and greater than third order popliteal artery   Angioplasty right superficial femoral artery and popliteal artery   Completion angiogram   Left leg selective angiogram   Ultrasound-guided vascular access Provider: Adry Mandel MD Assistants: Darryl Diaz NP.  Note that Darryl was present for the entire procedure and was involved in the closure of the access site. Maribell Olivarez MD.  Vascular surgery fellow involved in the entire operation. Indication: Is a 64-year-old gentleman with short distance claudication who has tried and failed supervised exercise regimen.  He had successful angioplasty of his left SFA in 2005 and this remains asymptomatic for him.  In this context would very much like an attempt at the same for the right leg.  On a statin and dual antiplatelet therapy. Sedation: Moderate Sedation: The procedure was performed with administration of intravenous conscious sedation with appropriate preoperative, intraoperative, and postoperative evaluation.  75 minutes of supervised face to face intraservice time was provided by a radiology nurse under my direct supervision.  Versed 4 mg and fentanyl 200 mcg given.  Antibiotics: None Fluoroscopic Time: 12.9 Minutes Radiation Dose: 621 mGy Contrast: 40 cc Visipaque Procedure:   Ultrasound was used to evaluate the right groin.  The selected vessel was the right common femoral artery. Ultrasound was then used for real-time ultrasound guided needle entry of the the right common femoral artery. Permanent images were recorded and saved to the patient's medical record.   Micropuncture technique was used and a 4 Norwegian sheath was exchanged in.  Omni Flush catheter was advanced the lower abdominal aorta and an aortogram was performed.   Findings: Widely patent lower abdominal aorta, common iliac arteries, external iliac arteries, and internal iliac arteries without disease.  Patent common femoral arteries without disease.   Wire catheter advanced into the stump of the right SFA and selective right leg angiogram was performed   Findings: Patent profunda femoris artery.  SFA origin widely patent but severe stenoses of up to 90% with multiple focal narrowings all the way down to and below Glenn's canal.  Beyond the popliteal arteries widely patent with three-vessel tibial runoff.  Dominant flow is posterior tibial artery to the foot.  The SFA disease equates to a TASC B lesion.   At this point over stiff angle Glidewire a 6 Norwegian Ansell sheath was exchanged in.  The patient was heparinized when ACT of 235.  A straight stiff Glidewire and CXI catheter were then used to traverse all the stenoses in the SFA.  CXI catheter was advanced to the popliteal level and an angiogram was performed   Findings: True lumen location of the CXI catheter within the above-knee popliteal artery.  A Louis wire was now exchanged in.  Over the Louis wire a 5 mm x 200 mm Sherrie plasty balloon was exchanged in.  2 separate inflations were required to treat the entire area of disease.  Each inflation was for 2 minutes.  The more distal inflation was 6 anil of pressure in the more proximal position was to 8 anil of  pressure.  A completion angiogram was then performed from the sheath level   Findings: No residual SFA stenosis.  No evidence of dissection.  No evidence of distal embolization.   The sheath was pulled back to the left external iliac artery level and a left leg angiogram was performed.   Findings: Patent profunda Bruno artery and SFA.  SFA has Glenn's canal region of disease of approximately 30%.  Patent popliteal artery with three-vessel tibial runoff.   The sheath was now removed and a 6 Micronesian Angio-Seal was used for access site closure.   Completion duplex was performed showing good positioning of the Angio-Seal in the anterior wall of the artery and widely patent flow through common femoral artery.  There is no flow luminal compromise, active extravasation, pseudoaneurysm formation. Impression: Successful right leg SFA angiogram tommy Mandel Vascular Surgery     Us Carotid Bilateral    Result Date: 9/4/2019      BILATERAL CAROTID ULTRASOUND Indication: SURVEILLANCE BILATERAL CAROTID STENOSIS Endarterectomy: NONE Previous: NONE Symptoms: Hypertension and Smoking TECHNIQUE: Duplex exam performed utilizing 2D gray-scale imaging, Doppler interrogation with color-flow and spectral waveform analysis. Right Velocity  Chart (cm/sec) Location Right CCA-PS 80 CCA- ED 27 ICA- ICA-ED 35 ECA- ECA-ED 15 Vertebral- Antegrade 70 Subclavian A- Triphasic 101 Ratio ICA/CCA-PS 1.39 Ratio ICA/CCA-ED 1.29 Left Velocity  Chart (cm/sec) Location Left CCA-PS 82 CCA- ED 9.4 ICA-PS OCCLUDED ICA-ED OCCLUDED ECA- ECA-ED 15 Vertebral- Antegrade 89 Subclavian A- Biphasic 134 Ratio ICA/CCA-PS   Ratio ICA/CCA-ED   Comment: LEFT ICA OCCLUDED FINDINGS: RIGHT: There is minimal  atheromatous plaque.  LEFT: There is predominantly echolucent atheromatous plaque.  Both vertebral arteries and subclavian artery waveforms are antegrade and normal. Impression:  1. Less than 50% diameter  diameter stenosis of the right ICA  relative to the distal ICA diameter.    2. Occluded left ICA. Evaluation based on velocities and NASCET criteria   Category PSV (cm/s)  Plaque Imaging EDV (cm/s)  ICA/CCA Ratio Normal,  <125  None <40 <2 Mild <50% <125 < than 50% DR stenosis <40 <2 Moderate Disease 50-69% 125-230 > than 50% DR stenosis  2.0-4.0 Severe->70% but less than near occlusion >230 > than 50% DR stenosis >100 >4.0 Near Occlusion May be low or undetectable Visible, extensive Variable Variable Occlusion No Flow Visible with no detectable lumen N/A N/A Plaquetype Description Type 1 Uniformly echolucent Type 2 Predominantly echolucent >50% Type 3 Predominantly echogenic >50% Type 4 Uniformly echogenic Type 5 Unclassified due to poor visualization Ulcer Visible Ulcerative Plaque     Us Ankle Brachial Indices Pre And Post Exercise    Result Date: 9/4/2019  Ultrasound Ankle Brachial Index with Exercise Indication: SURVEILLANCE FOLLOW UP RIGHT LEG ANGIOGRAM 8/23/19. HX: LEFT LEG ANGIOGRAM 2005 Previous: 6/25/2019 MACK'S WITH EXERCISE History: Smoke, Hypertension, PAD and Angioplasty Resting MACK's          Right: mmHg Index     Brachial: 172  Ankle-(PT): 170 0.92 Ankle-(DP): 145 0.78           Digit: 119 0.64               Left: mmHg Index     Brachial: 185  Ankle-(PT): 151 0.82 Ankle-(DP): 147 0.79           Digit: 114 0.62 Resting ankle-brachial index of 0.92 on the right. Toe Pressures of 119 mmHg and TBI of 0.64.  Resting ankle-brachial index of 0.82 on the left. Toe Pressures of 114 mmHg and TBI of 0.62. WAVEFORMS: The right dorsalis pedis and posterior tibial arteries show triphasic waveforms. The left dorsalis pedis and posterior tibial arteries show triphasic waveforms. Exercise Testing: TRIPATHI-LOAIZA PROTOCOL Time (min) Grade Level % Rate MPH Level of Pain (1-10) Area of Pain 1 0 2.0       2 0 2.0       3 2 2.0       4 2 2.0  5  LEFT CALF PAIN  5 4 2.0  5  LEFT CALF PAIN  6 4 2.0  5   LEFT CALF PAIN  7 6 2.0  7  LEFT CALF PAIN  8 6  2.0  8  LEFT CALF PAIN  9 8 2.0 9  LEFT CALF PAIN  10 8 2.0  9  LEFT CALF PAIN  11 10 2.0  9  STOPPED/ PAIN  Post Exercise Pressures: Location: Rest 1 minute 3 minute 5 minute Right Ankle  155 155 188 Left Ankle  132 139 160 Left Brachial 185 195 190 185 Right MACK: 0.92 0.79 0.82 1.02 Left MACK: 0.82 0.68 0.73 0.86 Comments:  Impression: 1. RIGHT LOWER EXTREMITY: Normal ankle-brachial index of 0.92 with normal toe pressures of 119 mmHg 2. LEFT LOWER EXTREMITY: Mildly abnormal ankle-brachial index of 0.82 with normal toe pressures of 114 mmHg 3. TRIPATHI LOAIZA TESTING: Patient able to walk for for left calf pain began 11 minutes before he had to stop walking.  Drop in ABIs post exercise support that his discomfort is vascular claudication     Us Aorta Ivc Iliac Non Graft Complete    Result Date: 9/4/2019  Aorta Ultrasound Indication:  SURVEILLANCE AAA Previous: NONE History: Smoke, Hypertension and PAD Technique: Duplex imaging is performed utilizing gray-scale, two dimensional images, and color-flow imaging. Doppler waveform analysis and spectral Doppler imagine is also performed. Waveforms: Triphasic= T, Biphasic= B, Monophasic=M  Diameter (cm) AP X Width Velocities Waveform Proximal Aorta:   3.5 X 3.0 70  T Mid Aorta:   2.21 X 2.1 76  B Distal Aorta:   1.87 X 2.3 71  T Right NAVNEET:   1.3 X 1.2 92  T Left NAVNEET:   1.4 X 1.3 97  T Previous Dimensions:  N/A Location of Aneurysm:  Suprarenal Aneurysm dimensions (AP x Width):   3.5 cm   X   3.0 cm Comments:  Impressions: 3.5 cm aneurysm of suprarenal aorta. Reference: Category Normal Intermediate Severe Occluded  PSV  cm/s 151-300 cm/s <45 or >300cm/s Absent Flow Ratio <1.5 1.5-3.4 >3.4 N/A     Us Arterial Leg Right    Result Date: 9/4/2019  Arterial Duplex Ultrasound Lower Extremity Artery Evaluation Indication: SURVEILLANCE FOLLOW UP RIGHT LEG ANGIOGRAM 8/23/19. HX: LEFT LEG ANGIOGRAM 2005  Previous: 6/25/2019 MACK'S WITH EXERCISE History: Smoke,  Hypertension, PAD and Angioplasty Technique: Duplex imaging is performed utilizing gray-scale, two-dimensional images, and color-flow imaging. Doppler waveform analysis and spectral Doppler imaging is also performed. LOWER EXTREMITY ARTERIAL DUPLEX EXAM WITH WAVEFORMS Right Leg:(cm/s) Location: Velocities Waveforms EIA:   134  T CFA:   134  T PFA:   204  T SFA Proximal:   122/130/164  T SFA Mid:   240 /173 B SFA Distal:   171  T Popliteal Artery:   86/67/70  B PTA:   85   T DAVIS:   46  B DPA:   48  B Waveforms: T=Triphasic, M=Monophasic, B=Biphasic Stenotic Profile Ratio: 164 / 130 = 1.26 Left Leg:(cm/s) Location: Velocities Waveforms EIA:   111  T PTA:   69   B DAVIS:   33  M DPA:   43  B Waveforms: T=Triphasic, M=Monophasic, B=Biphasic Comments:   Impression: Right Lower Extremity: Mid SFA at area of prior occlusion shows increased velocities consistent with 50% residual stenosis but normal waveforms suggesting that this is not hemodynamically significant.  If he can improvement status post right leg angiointervention Left Lower Extremity: Not evaluated Reference: Category Normal 1-19% 20-49% 50-99% Occluded PSV <160 cm/sec without spectral broadening <160 cm/sec with spectral broadening Increased Increased Absent Flow Ratio N/A N/A < 2.0 >2.0 N/A Post-Stenotic Turbulence No No No Yes N/A       I personally reviewed the images and my interpretation is that his right leg SFA is now patent on duplex imaging and his ABIs are normal with impressive improvement in his De La Paz Newman testing to where he can walk for 11 minutes and is only pink symptoms in his left leg.  Carotid duplex shows a left carotid occlusion which was not known previously.  This occlusion is confirmed on the CTA.  Ultrasound for AAA shows a small suprarenal aortic aneurysm.    LABS:      Sodium   Date Value Ref Range Status   06/18/2019 142 136 - 145 mmol/L Final   12/05/2018 139 136 - 145 mmol/L Final   02/14/2018 140 136 - 145 mmol/L Final      Potassium   Date Value Ref Range Status   08/23/2019 4.4 3.5 - 5.0 mmol/L Final   06/18/2019 4.4 3.5 - 5.0 mmol/L Final   12/05/2018 4.4 3.5 - 5.0 mmol/L Final     Chloride   Date Value Ref Range Status   06/18/2019 107 98 - 107 mmol/L Final   12/05/2018 103 98 - 107 mmol/L Final   02/14/2018 104 98 - 107 mmol/L Final     BUN   Date Value Ref Range Status   06/18/2019 16 8 - 22 mg/dL Final   12/05/2018 20 8 - 22 mg/dL Final   02/14/2018 20 8 - 22 mg/dL Final     Creatinine   Date Value Ref Range Status   08/23/2019 0.97 0.70 - 1.30 mg/dL Final   06/18/2019 0.85 0.70 - 1.30 mg/dL Final   12/05/2018 0.87 0.70 - 1.30 mg/dL Final     Hemoglobin   Date Value Ref Range Status   08/23/2019 15.6 14.0 - 18.0 g/dL Final   08/13/2019 15.4 14.0 - 18.0 g/dL Final   12/05/2018 14.5 14.0 - 18.0 g/dL Final     Platelets   Date Value Ref Range Status   08/23/2019 161 140 - 440 thou/uL Final   08/13/2019 150 140 - 440 thou/uL Final   12/05/2018 181 140 - 440 thou/uL Final     INR   Date Value Ref Range Status   08/23/2019 1.00 0.90 - 1.10 Final   03/30/2016 1.00 0.90 - 1.10 Final         Adry Mandel MD  VASCULAR SURGERY

## 2021-06-02 VITALS — BODY MASS INDEX: 29.68 KG/M2 | HEIGHT: 71 IN | WEIGHT: 212 LBS

## 2021-06-02 VITALS — WEIGHT: 215 LBS | BODY MASS INDEX: 29.99 KG/M2

## 2021-06-02 VITALS — WEIGHT: 215.4 LBS | BODY MASS INDEX: 30.15 KG/M2 | HEIGHT: 71 IN

## 2021-06-02 NOTE — PROGRESS NOTES
Preoperative Exam    Scheduled Procedure: cataract   Surgery Date:  11/5/19, 11/12/19  Surgery Location: [facility] fax    Surgeon:  Dr Lawler    Assessment/Plan:     Dung was seen today for pre-op exam.    Preop general physical exam    Cataract, unspecified cataract type, unspecified laterality    PVD (peripheral vascular disease) (H)    Mixed hyperlipidemia    Primary osteoarthritis involving multiple joints    Gout    Carotid occlusion, left    Abdominal aortic aneurysm (AAA) without rupture (H)      Surgical Procedure Risk: Low (reported cardiac risk generally < 1%)  Have you had prior anesthesia?: Yes  Have you or any family members had a previous anesthesia reaction:  No  Do you or any family members have a history of a clotting or bleeding disorder?: No  Cardiac Risk Assessment: no increased risk for major cardiac complications    APPROVAL GIVEN to proceed with proposed procedure, without further diagnostic evaluation    Functional Status: Independent  Patient plans to recover at home with family.     Subjective:      Dung Negrete is a 64 y.o. male who presents for a preoperative consultation.      He has symptomatic cataracts and needs surgical treatment for vision correction.    His medical history is significant for hyperlipidemia, peripheral vascular disease, intermittent gout, acid reflux, colon polyps, benign prostatic hypertrophy, left carotid occlusion and small AAA.    He is on clopidogrel (Plavix).    No concerning symptoms related to chronic medical conditions are reported.    He denies chest pain, shortness of breath and has no history of heart disease or lung disease.    He reports no signs or symptoms of an acute illness.     He has no prior problems with anesthesia or procedures.  He has no history of a blood clot or a bleeding disorder    All other systems reviewed and are negative, other than those listed in the HPI.    Pertinent History  Do you have difficulty  breathing or chest pain after walking up a flight of stairs: No  History of obstructive sleep apnea: No  Steroid use in the last 6 months: No  Frequent Aspirin/NSAID use: aspirin 81mg and plavix  Prior Blood Transfusion: No  Prior Blood Transfusion Reaction: No  If for some reason prior to, during or after the procedure, if it is medically indicated, would you be willing to have a blood transfusion?:  There is no transfusion refusal.    Current Outpatient Medications   Medication Sig Dispense Refill     aspirin 81 MG EC tablet Take 81 mg by mouth daily.       celecoxib (CELEBREX) 100 MG capsule TAKE 1 CAPSULE BY MOUTH EVERY DAY 90 capsule 3     cholecalciferol, vitamin D3, (VITAMIN D3) 400 unit cap Take 1 capsule by mouth daily.       clopidogrel (PLAVIX) 75 mg tablet Take 1 tablet (75 mg total) by mouth daily. 90 tablet 0     indomethacin (INDOCIN) 50 MG capsule Take 1 capsule (50 mg total) by mouth 3 (three) times a day with meals. 30 capsule 0     ketorolac (ACULAR) 0.5 % ophthalmic solution        lansoprazole (PREVACID) 30 MG capsule Take 1 capsule (30 mg total) by mouth daily. 90 capsule 3     loratadine 5 mg TbDL Take 5 mg by mouth daily.       moxifloxacin (VIGAMOX) 0.5 % ophthalmic solution        MULTIVITAMIN ORAL Take 1 tablet by mouth daily.       prednisoLONE acetate (PRED-FORTE) 1 % ophthalmic suspension        simvastatin (ZOCOR) 40 MG tablet Take 1 tablet (40 mg total) by mouth daily. 90 tablet 3     No current facility-administered medications for this visit.         No Known Allergies    Patient Active Problem List   Diagnosis     Mixed hyperlipidemia     Gout     Neck Sprain     Dyspnea     GERD (gastroesophageal reflux disease)     Polyarthralgia     Primary osteoarthritis involving multiple joints     Cellulitis and abscess of leg     Enthesopathy of knee     Health examination of defined subpopulation     PVD (peripheral vascular disease) (H)       Past Medical History:   Diagnosis Date      Cellulitis and abscess of leg 2006     Dyspnea 2015     Enthesopathy of knee 2006     GERD (gastroesophageal reflux disease) 2017     Health examination of defined subpopulation 2005     History of total hip arthroplasty, left 2018     Mixed hyperlipidemia     Created by Conversion      Neck Sprain     Created by Conversion      Polyarthralgia 2017     Primary osteoarthritis involving multiple joints 2017       Past Surgical History:   Procedure Laterality Date     ANGIOPLASTY Left      IR EXTREMITY ANGIOGRAM RIGHT  2019     TOTAL HIP ARTHROPLASTY Left 2019     TOTAL KNEE ARTHROPLASTY Right 2016       Social History     Socioeconomic History     Marital status:      Spouse name: Not on file     Number of children: Not on file     Years of education: Not on file     Highest education level: Not on file   Occupational History     Not on file   Social Needs     Financial resource strain: Not on file     Food insecurity:     Worry: Not on file     Inability: Not on file     Transportation needs:     Medical: Not on file     Non-medical: Not on file   Tobacco Use     Smoking status: Former Smoker     Packs/day: 1.00     Years: 39.00     Pack years: 39.00     Types: Cigarettes     Last attempt to quit: 2014     Years since quittin.4     Smokeless tobacco: Former User     Quit date: 2005   Substance and Sexual Activity     Alcohol use: Yes     Comment: occasional on weekends     Drug use: Yes     Types: Marijuana     Sexual activity: Yes     Partners: Female   Lifestyle     Physical activity:     Days per week: Not on file     Minutes per session: Not on file     Stress: Not on file   Relationships     Social connections:     Talks on phone: Not on file     Gets together: Not on file     Attends Mosque service: Not on file     Active member of club or organization: Not on file     Attends meetings of clubs or organizations: Not on file      "Relationship status: Not on file     Intimate partner violence:     Fear of current or ex partner: Not on file     Emotionally abused: Not on file     Physically abused: Not on file     Forced sexual activity: Not on file   Other Topics Concern     Not on file   Social History Narrative     Not on file       Patient Care Team:  Elmo Crump MD as PCP - General (Family Medicine)  Elmo Crump MD as Assigned PCP    Objective:     Vitals:    10/25/19 1346   BP: 134/76   Pulse: 74   SpO2: 97%   Weight: 214 lb 8 oz (97.3 kg)   Height: 5' 11\" (1.803 m)     Physical Exam:    General Appearance:    Alert, cooperative, no distress   Eyes:   No scleral icterus or conjunctival irritation       Ears:    Normal TM's and external ear canals, both ears   Throat:   Lips, mucosa, and tongue normal; teeth and gums normal   Neck:   Supple, symmetrical, trachea midline, no adenopathy;        thyroid:  No enlargement/tenderness/nodules   Lungs:     Clear to auscultation bilaterally, respirations unlabored, no wheezes or crackles   Heart:    Regular rate and rhythm,  No murmur   Abdomen:    Soft, no distention, no tenderness on palpation, no masses, no organomegaly     Extremities:  No edema, no joint swelling or redness, no evidence of any injuries   Skin:  No concerning skin findings, no suspicious moles, no rashes   Neurologic:  On gross examination there is no motor or sensory deficit.  Patient walks with a normal gait       EKG done August 2019.     Most Recent EKG     Units 08/13/19  0916   VENTRATE BPM 64   ATRIALRATE BPM 64   QRSDURATION ms 90   QTINTERVAL ms 398   QTCCALC ms 410   P [place] degrees 81   RAXIS degrees 14   TAXIS degrees 23   MUSEDX  Sinus rhythm with marked sinus arrhythmia  Otherwise normal ECG  When compared with ECG of 05-DEC-2018 10:04,  No significant change was found  Confirmed by DARWIN CHAVEZ, ASHISH LOC:YOSSI ([id number]) on 8/13/2019 4:48:31 PM       EKG reviewed.  No indication for repeat EKG.  No indication " for additional cardiac testing prior to proceeding with proposed procedure.      Immunization History   Administered Date(s) Administered     DT (pediatric) 08/09/1998     Hep A, historic 08/25/2010, 07/03/2012     Influenza high dose,seasonal,PF, 65+ yrs 09/23/2019     Influenza, inj, historic,unspecified 09/23/2019     Influenza,inj,MDCK,PF,Quad >4yrs 10/29/2018     Influenza,seasonal,quad inj =/> 6months 02/14/2018     Pneumo Conj 13-V (2010&after) 03/19/2019     Td,adult,historic,unspecified 08/09/1998     Tdap 07/03/2012     Electronically signed by Elmo Crump MD 10/25/19 9:01 AM

## 2021-06-02 NOTE — TELEPHONE ENCOUNTER
RN cannot approve Refill Request    RN can NOT refill this medication med is not covered by policy/route to provider. Last office visit: 3/19/2019 Elmo Crump MD Last Physical: 8/13/2019 Last MTM visit: Visit date not found Last visit same specialty: 3/19/2019 Elmo Crump MD.  Next visit within 3 mo: Visit date not found  Next physical within 3 mo: Visit date not found      Kaycee Esposito, [facility] Triage/Med Refill 10/12/2019    Requested Prescriptions   Pending Prescriptions Disp Refills     celecoxib (CELEBREX) 100 MG capsule  0     Sig: Take by mouth daily.       There is no refill protocol information for this order

## 2021-06-03 ENCOUNTER — RECORDS - HEALTHEAST (OUTPATIENT)
Dept: VASCULAR ULTRASOUND | Facility: CLINIC | Age: 66
End: 2021-06-03

## 2021-06-03 ENCOUNTER — OFFICE VISIT - HEALTHEAST (OUTPATIENT)
Dept: VASCULAR SURGERY | Facility: CLINIC | Age: 66
End: 2021-06-03

## 2021-06-03 VITALS — WEIGHT: 210 LBS | BODY MASS INDEX: 29.4 KG/M2 | HEIGHT: 71 IN

## 2021-06-03 VITALS — WEIGHT: 208 LBS | HEIGHT: 71 IN | BODY MASS INDEX: 29.12 KG/M2

## 2021-06-03 VITALS — BODY MASS INDEX: 29.27 KG/M2 | HEIGHT: 71 IN | WEIGHT: 209.1 LBS

## 2021-06-03 VITALS
HEIGHT: 71 IN | WEIGHT: 214.5 LBS | OXYGEN SATURATION: 97 % | SYSTOLIC BLOOD PRESSURE: 134 MMHG | HEART RATE: 74 BPM | BODY MASS INDEX: 30.03 KG/M2 | DIASTOLIC BLOOD PRESSURE: 76 MMHG

## 2021-06-03 VITALS — HEIGHT: 71 IN | BODY MASS INDEX: 29.68 KG/M2 | WEIGHT: 212 LBS

## 2021-06-03 DIAGNOSIS — I73.9 PERIPHERAL VASCULAR DISEASE, UNSPECIFIED (H): ICD-10-CM

## 2021-06-03 DIAGNOSIS — I65.29 CAROTID STENOSIS: ICD-10-CM

## 2021-06-03 DIAGNOSIS — I73.9 PVD (PERIPHERAL VASCULAR DISEASE) (H): ICD-10-CM

## 2021-06-04 VITALS — BODY MASS INDEX: 29.68 KG/M2 | HEIGHT: 71 IN | WEIGHT: 212 LBS

## 2021-06-04 VITALS
BODY MASS INDEX: 28.97 KG/M2 | OXYGEN SATURATION: 99 % | SYSTOLIC BLOOD PRESSURE: 140 MMHG | HEIGHT: 71 IN | DIASTOLIC BLOOD PRESSURE: 80 MMHG | WEIGHT: 206.9 LBS | HEART RATE: 74 BPM

## 2021-06-04 VITALS
DIASTOLIC BLOOD PRESSURE: 94 MMHG | BODY MASS INDEX: 30.82 KG/M2 | RESPIRATION RATE: 16 BRPM | WEIGHT: 221 LBS | SYSTOLIC BLOOD PRESSURE: 184 MMHG | HEART RATE: 68 BPM | TEMPERATURE: 97.8 F | OXYGEN SATURATION: 96 %

## 2021-06-04 VITALS
WEIGHT: 215 LBS | HEART RATE: 68 BPM | BODY MASS INDEX: 30.1 KG/M2 | DIASTOLIC BLOOD PRESSURE: 88 MMHG | HEIGHT: 71 IN | RESPIRATION RATE: 18 BRPM | SYSTOLIC BLOOD PRESSURE: 160 MMHG | TEMPERATURE: 98.2 F

## 2021-06-04 VITALS — WEIGHT: 215 LBS | BODY MASS INDEX: 30.1 KG/M2 | HEIGHT: 71 IN

## 2021-06-04 VITALS
WEIGHT: 208 LBS | DIASTOLIC BLOOD PRESSURE: 88 MMHG | HEIGHT: 71 IN | SYSTOLIC BLOOD PRESSURE: 150 MMHG | TEMPERATURE: 98.5 F | HEART RATE: 60 BPM | RESPIRATION RATE: 16 BRPM | BODY MASS INDEX: 29.12 KG/M2

## 2021-06-04 NOTE — PROGRESS NOTES
CT @ [facility]. NPO2.   USs BEFORE. 3 mo f/u PVD. Hx bilateral angiograms. Patient has not had symptoms of amaurosis fugax or TIA but his father did have strokes and his brother did have prior carotid endarterectomy.Bilateral occlusion. Not having symptoms consistent with aortic aneurysm disease

## 2021-06-04 NOTE — PROGRESS NOTES
VASCULAR SURGERY OUTPATIENT CONSULT OR VISIT   VASCULAR SURGEON: Adry Mandel MD    LOCATION:  [facility]    Dung Negrete   Medical Record #:  [id number]  YOB: 1955  Age:  64 y.o.     Date of Service: 12/4/2019    PRIMARY CARE PROVIDER: Elmo Crump MD      Reason for consultation: Follow-up of PAD.    IMPRESSION: Patient clinically doing extremely well status post right SFA angioplasty with no recurrence of his claudication symptoms.  Patient's only pain is in the left calf which was intervened on back in 2004 and is still patent.  ABIs are slightly diminished on the recently treated right side compared to his immediate post procedure MACK and duplex suggests greater than 50% stenosis in the distal SFA.  Patient is on dual antiplatelet therapy and statin.  Does not smoke.  Drug-eluting balloon therapy was not used as this had been taken off her inventory at that time due to FDA concerns.    Separate from this, CT chest abdomen pelvis revealed that he does not actually have aortic aneurysm disease and the finding on ultrasound was likely inadvertent inclusion of periaortic tissue at the level of the diaphragmatic hiatus.    The patient's known left carotid occlusion was not reimaged on this visit    RECOMMENDATION: Clinically doing extremely well.  High risk of recurrence of his stenosis given that he was treated with simple balloon angioplasty and we are already seeing some recurrence.  We will therefore see him back in just 3 months time and have him continue on the dual antiplatelet therapy as well as statin.  If things do not stabilize and going to progress further we will look at re-intervening with either drug-eluting balloon angioplasty or stenting of the SFA.    When he comes back we will not reimage his carotids as this only needs to be checked once a year.    HPI:  Dung Negrete is a 64 y.o. male who was seen today in follow-up for his peripheral vascular disease.   Treated with right SFA angioplasty with simple balloon therapy in August of this year.  Complete resolution of his symptoms.  Doing very well clinically.  Bruises easily on dual and platelet therapy but is being compliant.    No other new health issues.  Discussed with him that he does not have any aortic aneurysm disease.    PHH:    Past Medical History:   Diagnosis Date     Cellulitis and abscess of leg 8/31/2006     Dyspnea 5/27/2015     Enthesopathy of knee 8/31/2006     GERD (gastroesophageal reflux disease) 1/8/2017     Health examination of defined subpopulation 12/27/2005     History of total hip arthroplasty, left 12/28/2018     Mixed hyperlipidemia     Created by Conversion      Neck Sprain     Created by Conversion      Polyarthralgia 5/17/2017     Primary osteoarthritis involving multiple joints 5/17/2017        Past Surgical History:   Procedure Laterality Date     ANGIOPLASTY Left 2007     IR EXTREMITY ANGIOGRAM RIGHT  8/23/2019     TOTAL HIP ARTHROPLASTY Left 12/28/2019     TOTAL KNEE ARTHROPLASTY Right 04/13/2016       ALLERGIES:  Patient has no known allergies.    MEDS:    Current Outpatient Medications:      aspirin 81 MG EC tablet, Take 81 mg by mouth daily., Disp: , Rfl:      celecoxib (CELEBREX) 100 MG capsule, TAKE 1 CAPSULE BY MOUTH EVERY DAY, Disp: 90 capsule, Rfl: 3     cholecalciferol, vitamin D3, (VITAMIN D3) 400 unit cap, Take 1 capsule by mouth daily., Disp: , Rfl:      clopidogrel (PLAVIX) 75 mg tablet, Take 1 tablet (75 mg total) by mouth daily., Disp: 90 tablet, Rfl: 0     indomethacin (INDOCIN) 50 MG capsule, Take 1 capsule (50 mg total) by mouth 3 (three) times a day with meals., Disp: 30 capsule, Rfl: 0     ketorolac (ACULAR) 0.5 % ophthalmic solution, , Disp: , Rfl:      lansoprazole (PREVACID) 30 MG capsule, Take 1 capsule (30 mg total) by mouth daily., Disp: 90 capsule, Rfl: 3     loratadine 5 mg TbDL, Take 5 mg by mouth daily., Disp: , Rfl:      moxifloxacin (VIGAMOX) 0.5 %  ophthalmic solution, , Disp: , Rfl:      MULTIVITAMIN ORAL, Take 1 tablet by mouth daily., Disp: , Rfl:      prednisoLONE acetate (PRED-FORTE) 1 % ophthalmic suspension, , Disp: , Rfl:      simvastatin (ZOCOR) 40 MG tablet, Take 1 tablet (40 mg total) by mouth daily., Disp: 90 tablet, Rfl: 3    SOCIAL HABITS:    Social History     Tobacco Use   Smoking Status Former Smoker     Packs/day: 1.00     Years: 39.00     Pack years: 39.00     Types: Cigarettes     Last attempt to quit: 2014     Years since quittin.5   Smokeless Tobacco Former User     Quit date: 2005       Social History     Substance and Sexual Activity   Alcohol Use Yes    Comment: occasional on weekends       Social History     Substance and Sexual Activity   Drug Use Yes     Types: Marijuana       FAMILY HISTORY:    Family History   Problem Relation Age of Onset     No Medical Problems Mother      Heart disease Father      Hypertension Father      Diabetes Sister      Heart disease Brother      Diabetes Brother      Heart attack Brother      Diabetes Sister        REVIEW OF SYSTEMS:    A 12 point ROS was reviewed and except for what is listed in the HPI above, all others are negative    PE:  /86   Pulse 72   Temp 98.7  F (37.1  C)   Resp 18   Wt Readings from Last 1 Encounters:   10/25/19 214 lb 8 oz (97.3 kg)     There is no height or weight on file to calculate BMI.    EXAM:  GENERAL: This is a well-developed 64 y.o. male who appears his stated age  EYES: Grossly normal.  MOUTH: Buccal mucosa normal   MUSCULOSKELETAL: Grossly normal and both lower extremities are intact.  HEME/LYMPH: No lymphedema  NEUROLOGIC: Focally intact, Alert and oriented x 3.   PSYCH: appropriate affect  INTEGUMENT: No open lesions or ulcers      DIAGNOSTIC STUDIES:     Images:  Us Ankle Brachial Indices Pre And Post Exercise    Result Date: 2019  Ultrasound Ankle Brachial Index with Exercise Indication: SURVEILLANCE PVD, RIGHT LEG ANGIOGRAM 2019.  HX: LEFT LEG ANGIOGRAM 2005 Previous: 9/4/2019 History: Smoke, Hypertension, PAD and Angioplasty Resting MACK's          Right: mmHg Index     Brachial: 182  Ankle-(PT): 158 0.87 Ankle-(DP): 129 0.71           Digit: 107 0.59               Left: mmHg Index     Brachial: 181  Ankle-(PT): 182 1.00 Ankle-(DP): 158 0.87           Digit: 116 0.64 Resting ankle-brachial index of 087 on the right. Toe Pressures of 107 mmHg and TBI of 0.59.  Resting ankle-brachial index of 1.00 on the left. Toe Pressures of 116 mmHg and TBI of 0.64. WAVEFORMS: The right dorsalis pedis and posterior tibial arteries show normal waveforms. The left dorsalis pedis and posterior tibial arteries show normal waveforms. Exercise Testing: DE LA PAZ-LOAIZA PROTOCOL Time (min) Grade Level % Rate MPH Level of Pain (1-10) 0Area of Pain 1 0 2.0  0    2 0 2.0  0    3 2 2.0  0    4 2 2.0  0  5 4 2.0  0    6 4 2.0  0    7 6 2.0  0    8 6 2.0  0    9 8 2.0  0    10 8 2.0  0    11 10 2.0  0    12 10 2.0  8  LT CALF PAIN  13 12 2.0  8  STOPPED Post Exercise Pressures: Location: Rest 1 minute 3 minute 5 minute 7 minute Right Ankle  90 123 148 163 Left Ankle  136 153 170 179 Right Brachial 182 199 175 189 179 Right MACK: 0.87 0.45 0.70 0.78 0.91 Left MACK: 1.00 0.68 0.87 0.90 1.00 Comments:  Impression: 1. RIGHT LOWER EXTREMITY: Mildly abnormal MACK of 0.87 with normal toe pressure of 107 mmHg 2. LEFT LOWER EXTREMITY: Normal ankle-brachial index of 1.00 normal toe pressure of 160 mmHg. 3. DE LA PAZ LOAIZA TESTING: Unable to walk for a full 13 minutes on [place] P[place] testing having to stop from left calf pain that started at the 12-minute estrada.  Drop in ABIs on both sides support that the symptoms were from a vascular flow limitation.  Another was likely claudication.     Us Arterial Leg Right    Result Date: 12/4/2019  Arterial Duplex Ultrasound Lower Extremity Artery Evaluation Indication: SURVEILLANCE PVD, RIGHT LEG ANGIOGRAM 8/23/2019. HX: LEFT LEG  ANGIOGRAM 2005  Previous: 9/4/2019 History: Smoke, Hypertension, PAD and Angioplasty Technique: Duplex imaging is performed utilizing gray-scale, two-dimensional images, and color-flow imaging. Doppler waveform analysis and spectral Doppler imaging is also performed. LOWER EXTREMITY ARTERIAL DUPLEX EXAM WITH WAVEFORMS Right Leg:(cm/s) Location: Velocities Waveforms EIA:   138  T CFA:   116  T PFA:   227  T SFA Proximal:   110  B SFA Mid:   88/171 B SFA Distal:   234/86  B Popliteal Artery:   64  B PTA:   69   T DAVIS:   32  M DPA:   48  B Waveforms: T=Triphasic, M=Monophasic, B=Biphasic Stenotic Profile Ratio: 234 / 86 = 2.72 Left Leg:(cm/s) Location: Velocities Waveforms PTA:   91   T DAVIS:   32  B DPA:   60  T Waveforms: T=Triphasic, M=Monophasic, B=Biphasic Comments:   Impression: Right Lower Extremity: SFA remains patent although peak systolic velocities in the distal SFA are elevated consistent with a greater than 50% stenosis.  Waveforms into the tibial arteries remain by and triphasic suggesting no hemodynamically critical upstream disease. Left Lower Extremity: Triphasic waveforms in the tibial arteries Reference: Category Normal 1-19% 20-49% 50-99% Occluded PSV <160 cm/sec without spectral broadening <160 cm/sec with spectral broadening Increased Increased Absent Flow Ratio N/A N/A < 2.0 >2.0 N/A Post-Stenotic Turbulence No No No Yes N/A       I personally reviewed the images and my interpretation is that his ABIs are mildly diminished on the right side at 0.87.  De La Paz Newman testing shows that he is able to walk for 30 minutes and he feels he could have walked further.  Limited by left calf pain.  Duplex imaging of the right previously treated SFA shows some 50% stenosis in the distal SFA..    LABS:      Sodium   Date Value Ref Range Status   06/18/2019 142 136 - 145 mmol/L Final   12/05/2018 139 136 - 145 mmol/L Final   02/14/2018 140 136 - 145 mmol/L Final     Potassium   Date Value Ref Range Status    08/23/2019 4.4 3.5 - 5.0 mmol/L Final   06/18/2019 4.4 3.5 - 5.0 mmol/L Final   12/05/2018 4.4 3.5 - 5.0 mmol/L Final     Chloride   Date Value Ref Range Status   06/18/2019 107 98 - 107 mmol/L Final   12/05/2018 103 98 - 107 mmol/L Final   02/14/2018 104 98 - 107 mmol/L Final     BUN   Date Value Ref Range Status   06/18/2019 16 8 - 22 mg/dL Final   12/05/2018 20 8 - 22 mg/dL Final   02/14/2018 20 8 - 22 mg/dL Final     Creatinine   Date Value Ref Range Status   08/23/2019 0.97 0.70 - 1.30 mg/dL Final   06/18/2019 0.85 0.70 - 1.30 mg/dL Final   12/05/2018 0.87 0.70 - 1.30 mg/dL Final     Hemoglobin   Date Value Ref Range Status   08/23/2019 15.6 14.0 - 18.0 g/dL Final   08/13/2019 15.4 14.0 - 18.0 g/dL Final   12/05/2018 14.5 14.0 - 18.0 g/dL Final     Platelets   Date Value Ref Range Status   08/23/2019 161 140 - 440 thou/uL Final   08/13/2019 150 140 - 440 thou/uL Final   12/05/2018 181 140 - 440 thou/uL Final     INR   Date Value Ref Range Status   08/23/2019 1.00 0.90 - 1.10 Final   03/30/2016 1.00 0.90 - 1.10 Final     .    Adry Mandel MD  VASCULAR SURGERY

## 2021-06-05 VITALS
BODY MASS INDEX: 29.96 KG/M2 | RESPIRATION RATE: 18 BRPM | HEART RATE: 76 BPM | DIASTOLIC BLOOD PRESSURE: 82 MMHG | WEIGHT: 214 LBS | HEIGHT: 71 IN | SYSTOLIC BLOOD PRESSURE: 152 MMHG

## 2021-06-05 NOTE — TELEPHONE ENCOUNTER
Refill Approved    Rx renewed per Medication Renewal Policy. Medication was last renewed on 1/25/19.    Bridgette Amador, [facility] Connection Triage/Med Refill 2/8/2020     Requested Prescriptions   Pending Prescriptions Disp Refills     lansoprazole (PREVACID) 30 MG capsule [Pharmacy Med Name: LANSOPRAZOLE DR 30 MG CAPSULE] 90 capsule 3     Sig: TAKE 1 CAPSULE BY MOUTH EVERY DAY       GI Medications Refill Protocol Passed - 2/8/2020 12:31 AM        Passed - PCP or prescribing provider visit in last 12 or next 3 months.     Last office visit with prescriber/PCP: Visit date not found OR same dept: Visit date not found OR same specialty: 3/19/2019 Elmo Crump MD  Last physical: Visit date not found Last MTM visit: Visit date not found   Next visit within 3 mo: Visit date not found  Next physical within 3 mo: Visit date not found  Prescriber OR PCP: Joslyn Calzada MD  Last diagnosis associated with med order: 1. GERD (gastroesophageal reflux disease)  - lansoprazole (PREVACID) 30 MG capsule [Pharmacy Med Name: LANSOPRAZOLE DR 30 MG CAPSULE]; TAKE 1 CAPSULE BY MOUTH EVERY DAY  Dispense: 90 capsule; Refill: 3    If protocol passes may refill for 12 months if within 3 months of last provider visit (or a total of 15 months).

## 2021-06-06 NOTE — TELEPHONE ENCOUNTER
RN cannot approve Refill Request    RN can NOT refill this medication med is not covered by policy/route to provider. Last office visit: 3/19/2019 Elmo Crump MD Last Physical: 10/25/2019 Last MTM visit: Visit date not found Last visit same specialty: 3/19/2019 Elmo Crump MD.  Next visit within 3 mo: Visit date not found  Next physical within 3 mo: Visit date not found      Lashawn Antonio, [facility] Triage/Med Refill 3/17/2020    Requested Prescriptions   Pending Prescriptions Disp Refills     indomethacin (INDOCIN) 50 MG capsule [Pharmacy Med Name: INDOMETHACIN 50 MG CAPSULE] 30 capsule 0     Sig: TAKE 1 CAPSULE (50 MG TOTAL) BY MOUTH 3 (THREE) TIMES A DAY WITH MEALS.       There is no refill protocol information for this order

## 2021-06-06 NOTE — TELEPHONE ENCOUNTER
Patient had requested medication refill for indomethacin.  I am concerned that this medication could be more harmful for him given his current situation.  Please determine if he is having any current concerns with gout and let me know.

## 2021-06-06 NOTE — PROGRESS NOTES
Assessment & Plan:       1. Laceration of left thumb without foreign body without damage to nail, initial encounter  lidocaine 10 mg/mL (1 %) injection 1 mL    Td, Preservative Free (green label)   2. Hypertension, unspecified type       Medical Decision Making  Patient presents with a laceration to the left hand dorsal thumb that is amenable to suture repair.  See procedure notes below for more details.  Patient was also found to have hypertension that has been untreated.  Recommended he follow-up with his primary care in 1 to 2 weeks to recheck blood pressure at that time.  He otherwise has no symptoms of headache, vision changes, nausea, vomiting, confusion, muscle weakness.    Procedure  The wound area was cleansed with chlorhexidine gluconate and draped in a sterile fashion.  The wound area was anesthetized with Lidocaine 1% without epinephrine without added sodium bicarbonate.  The wound was explored with the following results No foreign bodies found, No tendon laceration seen.  The wound was repaired with 4-0 Nylon; 4 sutures were used.  Wound care discussed.  Tetanus immunization given.  Suture removal in 7 days.    Patient Instructions   Your wound was closed with sutures and covered with antibiotic ointment and a non-adhesive dressing.    General wound maintenance:  - Dressings should be left in place for 24 hours, afterwards wound may be left open to air  - May continue to cover injury site with a topical antibiotic such as neosporin  - A simple band-aid will work to protect small wounds and keep topical antibiotic on  - Cloth gauze are more useful for covering larger wounds with tape to secure  - May shower normally if it has been longer than 12 hours since sutures were placed    Avoid any swimming until after sutures have been removed.    Follow-up in 7 days to remove your sutures.    Come back sooner if you develop fever or your wound site becomes increasingly red, tender, producing pus or other  drainage, or reopens.          Subjective:       Dung Negrete is a 65 y.o. male here for evaluation of a thumb laceration.  Event of injury occurred today.  Patient was cutting and preparing venison when he cut the dorsal left hand thumb.  Patient is on Plavix and baby aspirin.  He did have difficulty stopping the bleeding.  Patient is able to move the thumb without difficulty.  He denies numbness and tingling.  Tetanus was last given in .    The following portions of the patient's history were reviewed and updated as appropriate: allergies, current medications and problem list.    Review of Systems  Pertinent items are noted in HPI.     Allergies  No Known Allergies    Family History   Problem Relation Age of Onset     No Medical Problems Mother      Heart disease Father      Hypertension Father      Diabetes Sister      Heart disease Brother      Diabetes Brother      Heart attack Brother      Diabetes Sister        Social History     Socioeconomic History     Marital status:      Spouse name: None     Number of children: None     Years of education: None     Highest education level: None   Occupational History     None   Social Needs     Financial resource strain: None     Food insecurity:     Worry: None     Inability: None     Transportation needs:     Medical: None     Non-medical: None   Tobacco Use     Smoking status: Former Smoker     Packs/day: 1.00     Years: 39.00     Pack years: 39.00     Types: Cigarettes     Last attempt to quit: 2014     Years since quittin.7     Smokeless tobacco: Former User     Quit date: 2005   Substance and Sexual Activity     Alcohol use: Yes     Comment: occasional on weekends     Drug use: Yes     Types: Marijuana     Sexual activity: Yes     Partners: Female   Lifestyle     Physical activity:     Days per week: None     Minutes per session: None     Stress: None   Relationships     Social connections:     Talks on phone: None     Gets together: None      Attends Hinduism service: None     Active member of club or organization: None     Attends meetings of clubs or organizations: None     Relationship status: None     Intimate partner violence:     Fear of current or ex partner: None     Emotionally abused: None     Physically abused: None     Forced sexual activity: None   Other Topics Concern     None   Social History Narrative     None         Objective:       BP (!) 184/94   Pulse 68   Temp 97.8  F (36.6  C) (Oral)   Resp 16   Wt 221 lb (100.2 kg)   SpO2 96%   BMI 30.82 kg/m    General appearance: alert, appears stated age, cooperative, no distress and non-toxic  Extremities: Left hand: No obvious deformity, full range of motion of all hand and finger joints without difficulty  Pulses: 2+ and symmetric  Skin: 2.5 cm linear laceration affecting the left hand dorsal thumb adjacent to the interphalangeal joint, no tendon or joint involvement, no foreign body seen

## 2021-06-06 NOTE — PROGRESS NOTES
Patient is in clinic today to see MD Adry Mandel.      Patient is here for a 3 month follow up to discuss PAD and Carotid Stenosis, s/p right SFA angioplasty.    The patient does not smoke.    Pt is currently taking aspirin, Plavix and statin.    The patient states they are NOT wearing compression.    Pt rates pain 0/10.    The provider has been notified that the patient has no concerns.    At the end of the visit the patient was provided with education both verbally and on their AVS.

## 2021-06-06 NOTE — PROGRESS NOTES
Patient seen with our vascular surgery fellow Dr. Natalio Longoria.  Please see his detailed note of this encounter.  In summary this is a patient who I treated for right leg claudication with simple balloon angioplasty at a time when drug-eluting balloon therapy was off the market.  He had immediate response from a short distance claudication but over 2 successive visits his mid SFA has shown stepwise worsening stenosis to now where duplex appears to suggest a greater 70% stenosis.  In this context I think it is reasonable to go ahead with a repeat angiogram intervention presumably with a plan for either drug-eluting balloon or drug-eluting stent therapy.  I think it is better to do this then to wait for complete reocclusion and then try to intervene.  We will plan for this sometime in the next month.  H&P done at this visit

## 2021-06-07 NOTE — TELEPHONE ENCOUNTER
LMTCB, need to schedule a EVIST NV with writer on 4/20- want to touch base since angiogram was cancelled on 4/3.

## 2021-06-08 NOTE — PROGRESS NOTES
VASCULAR SURGERY OUTPATIENT CONSULT OR VISIT   VASCULAR SURGEON: Adry Mandel MD    LOCATION:  [facility]    Dung Negrete   Medical Record #:  [id number]  YOB: 1955  Age:  65 y.o.     Date of Service: 6/10/2020    PRIMARY CARE PROVIDER: Elmo Crump MD      Reason for consultation: Evaluation of the right leg angiointervention    IMPRESSION: Clinically doing fairly well from the standpoint although suffering from ongoing gout flareup.  Noninvasive studies show widely patent right SFA with widely patent stent.  Normal ABIs bilaterally.    RECOMMENDATION: Successful treatment of restenosis after Glenn's canal angioplasty by using drug-eluting stent.  Compliant with his dual antiplatelet therapy.  Continue this for 3 months.  Return visit at that time with a repeat arterial duplex and ABIs    HPI:  Dung Negrete is a 65 y.o. male who was seen today in follow-up after angiogram intervention performed for visible restenosis of his prior SFA intervention.  His SFA intervention was done with simple balloon angioplasty and was initially quite successful but has restenosed on duplex imaging.  Plan for intervention to prevent complete occlusion.  This went smoothly without complication and I elected to place short segment Zilver PTX stent which I think should give him excellent 2 year patency even this was the TASC a lesion.  Patient has done overall fairly well.  He is limited in his walking due to severe gout flareup in his right first toe.  This is very consistent with his prior gout episodes which she has had since the age of the 20s.  He is been on steroids for this.  He is also on Celebrex.    No other new health issues..    PHH:    Past Medical History:   Diagnosis Date     Cellulitis and abscess of leg 8/31/2006     Dyspnea 5/27/2015     Enthesopathy of knee 8/31/2006     GERD (gastroesophageal reflux disease) 1/8/2017     Health examination of defined subpopulation  2005     History of total hip arthroplasty, left 2018     Mixed hyperlipidemia     Created by Conversion      Neck Sprain     Created by Conversion      Polyarthralgia 2017     Primary osteoarthritis involving multiple joints 2017        Past Surgical History:   Procedure Laterality Date     ANGIOPLASTY Left      IR EXTREMITY ANGIOGRAM RIGHT  2019     IR EXTREMITY ANGIOGRAM RIGHT  2020     TOTAL HIP ARTHROPLASTY Left 2019     TOTAL HIP ARTHROPLASTY Right        ALLERGIES:  Patient has no known allergies.    MEDS:    Current Outpatient Medications:      aspirin 81 MG EC tablet, Take 81 mg by mouth daily., Disp: , Rfl:      celecoxib (CELEBREX) 100 MG capsule, TAKE 1 CAPSULE BY MOUTH EVERY DAY, Disp: 90 capsule, Rfl: 3     cholecalciferol, vitamin D3, (VITAMIN D3) 400 unit cap, Take 1 capsule by mouth daily., Disp: , Rfl:      clopidogrel (PLAVIX) 75 mg tablet, Take 1 tablet (75 mg total) by mouth daily., Disp: 90 tablet, Rfl: 0     lansoprazole (PREVACID) 30 MG capsule, TAKE 1 CAPSULE BY MOUTH EVERY DAY, Disp: 90 capsule, Rfl: 2     loratadine 5 mg TbDL, Take 5 mg by mouth daily., Disp: , Rfl:      MULTIVITAMIN ORAL, Take 1 tablet by mouth daily., Disp: , Rfl:      predniSONE (DELTASONE) 20 MG tablet, TAKE 1 TABLET BY MOUTH DAILY FOR 5 DAYS, Disp: 5 tablet, Rfl: 5     simvastatin (ZOCOR) 40 MG tablet, Take 1 tablet (40 mg total) by mouth daily., Disp: 90 tablet, Rfl: 3    SOCIAL HABITS:    Social History     Tobacco Use   Smoking Status Former Smoker     Packs/day: 1.00     Years: 39.00     Pack years: 39.00     Types: Cigarettes     Last attempt to quit: 2014     Years since quittin.0   Smokeless Tobacco Former User     Quit date: 2005       Social History     Substance and Sexual Activity   Alcohol Use Yes    Comment: occasional on weekends       Social History     Substance and Sexual Activity   Drug Use Yes     Types: Marijuana       FAMILY HISTORY:    Family  "History   Problem Relation Age of Onset     No Medical Problems Mother      Heart disease Father      Hypertension Father      Diabetes Sister      Heart disease Brother      Diabetes Brother      Heart attack Brother      Diabetes Sister        REVIEW OF SYSTEMS:    A 12 point ROS was reviewed and except for what is listed in the HPI above, all others are negative    PE:  /88   Pulse 68   Temp 98.2  F (36.8  C)   Resp 18   Ht 5' 11\" (1.803 m)   Wt 215 lb (97.5 kg)   BMI 29.99 kg/m    Wt Readings from Last 1 Encounters:   06/10/20 215 lb (97.5 kg)     Body mass index is 29.99 kg/m .    EXAM:  GENERAL: This is a well-developed 65 y.o. male who appears his stated age  EYES: Grossly normal.  MOUTH: Buccal mucosa normal   MUSCULOSKELETAL: Grossly normal and both lower extremities are intact.  Swollen right first toe with some rubor consistent with a gout flareup.  HEME/LYMPH: No lymphedema  NEUROLOGIC: Focally intact, Alert and oriented x 3.   PSYCH: appropriate affect  INTEGUMENT: No open lesions or ulcers  Pulse Exam:   Able pedal pulse.      DIAGNOSTIC STUDIES:     Images:  Ir Extremity Angiogram Right    Result Date: 5/22/2020  [facility] Interventional Radiology vascular surgery procedure Date: 05/22/20 Procedures Performed: Ir Extremity Angiogram Right from the aorta level via left groin approach   Right drug-eluting SFA stent placement   Ultrasound-guided vascular access   Selective right leg angiogram with distalmost catheter position in greater than third order popliteal artery.  Note that this is included within the stent code Provider: Adry Mandel MD Assistant: Natalio Longoria MD, vascular surgery fellow Indication: This is a 65-year-old gentleman who I treated in the last year for short distance claudication with simple balloon angioplasty of a focal SFA lesion.  On surveillance duplex imaging now has critical stenosis of that area and some recurrence of his claudication.  Plan for repeat " intervention with plan to use a drug-eluting therapy for increased durability. Sedation: Moderate Sedation: The procedure was performed with administration of intravenous conscious sedation with appropriate preoperative, intraoperative, and postoperative evaluation.  71 minutes of supervised face to face intraservice time was provided by a radiology nurse under my direct supervision.  Versed 4 mg, fentanyl 150 micrograms. Antibiotics: None Fluoroscopic Time: 16.2 Minutes Radiation Dose: 553 mGy Contrast: 65 cc Visipaque Procedure:   Ultrasound was used to evaluate the left groin.  The selected vessel was the left common femoral artery which was widely patent and of good caliber.  Small areas of focal calcification but otherwise healthy for access. Ultrasound was then used for real-time ultrasound guided needle entry of the common femoral artery. Permanent images were recorded and saved to the patient's medical record.   Micropuncture technique was used to deliver a 4 Bel[place]an sheath this in turn was used to advance of 4 [place]an Omni Flush catheter to the lower abdominal aorta and an initial aortogram was performed   Findings: Patent distal abdominal aorta, common iliac arteries, internal iliac arteries, external iliac arteries and common femoral arteries bilaterally   Wire catheter advanced to the common femoral level on the right side and selective right leg angiogram was performed   Findings: Patent profunda femoris artery.  Largely patent SFA with only irregularity and no important stenosis down to the level of Glenn's canal..  There was a greater than 80% focal stenosis of approximately 1-1/2 cm in length preceded by a tapering of approximately 2 cm in length.  Beyond this the popliteal artery was widely patent and there was three-vessel tibial runoff with a patent pedal arch.   This point the patient was heparinized and a 6 Bel[place]an Ansell sheath was exchanged in.  CXI catheter and stiff straight Glidewire were  used to advance across the past the lesion into the above-knee popliteal artery.  A selective angiogram was performed from this level through the catheter   Findings: Intraluminal location of the catheter in the above-knee popliteal artery past the stenosis.   A Louis wire was now exchanged in and over this a 6 mm x 40 mm Zilver PTX stent was exchanged and deployed across the critical stenosis in the taper above it to completely treat the area of disease.  There was still a waist in the stent and so post ballooning of the stent was performed with a 5 mm x 4 cm angioplasty balloon taken up to 10 anil of pressure.  Completion angiogram was then performed from the sheath level   Findings: Widely patent flow through the SFA into Glenn's canal with no residual stenosis and no evidence of dissection or distal embolization.  Maintained pedal flow.   We now pulled a wire and sheath back and performed an angiogram of her access site which showed an adequate access for use of percutaneous closure device.  A 6 Turkmen Angio-Seal was deployed without difficulty and a completion duplex confirmed good positioning of the Angio-Seal on the anterior wall of the artery and good flow beyond it on duplex imaging. Impression: Successful retreatment of recurrent Glenn's canal stenosis with drug-eluting Zilver PTX stent.  Successful access site closure with Angio-Seal device. Adry Mandel Vascular Surgery    Us Arterial Pressures Legs Bilateral    Result Date: 6/10/2020      RESTING ANKLE-BRACHIAL INDICES (MACK'S)  Indication: Follow up on Right Angio with SFA mid stent placement 5/22/20 Previous: 3/4/2020 History: Previous Smoker, Hyperlipidemia, PAD, Angioplasty, Vascular Surgery and Claudication         Right: mmHg Index   Brachial: 182  Ankle-(PT): 224 1.18 Ankle-(DP): 183 0.96          Digit: 121 0.64              Left: mmHg Index    Brachial: 190  Ankle-(PT): 201 1.06 Ankle-(DP): 179 0.94         Digit:  145 0.76 Resting  ankle-brachial index of 1.18 on the right. Toe Pressures of 121 mmHg and TBI of 0.64  Resting ankle-brachial index of 1.06 on the left. Toe Pressures of 145 mmHg and TBI of 0.76  WAVEFORMS: The right dorsalis pedis and posterior tibial arteries show monophasic waveforms. The left dorsalis pedis and posterior tibial arteries show monophasic waveforms. Comments:   Impression:  Right MACK is  Normal with an MACK of 1.18 and Toe Pressures of 121 mmHg which is normal. Left MACK is Normal with an MACK of 1.06 and Toe Pressures of 145 mmHg which is normal.     Us Arterial Leg Right    Result Date: 6/10/2020  Arterial Duplex Ultrasound Lower Extremity Artery Evaluation  Indication: Follow up on Right Angio with SFA mid stent placement 5/22/20 Previous: 3/4/2020 History: Previous Smoker, Hyperlipidemia, PAD, Angioplasty, Vascular Surgery and Claudication  Technique: Duplex imaging is performed utilizing gray-scale, two-dimensional images, and color-flow imaging. Doppler waveform analysis and spectral Doppler imaging is also performed.  LOWER EXTREMITY ARTERIAL DUPLEX EXAM WITH WAVEFORMS  Right Leg:(cm/s) Location Velocities Waveforms EIA   141  T CFA   117  T PFA   168  T SFA Proximal   137  T SFA Mid   247  T SFA Distal   134  T Popliteal Artery   74  T PTA   82   B DAVIS   32  B DPA   27  B Waveforms: T=Triphasic, M=Monophasic, B=Biphasic Left Leg:(cm/s) Location: Velocities Waveforms PTA:   60   B DAVIS:   52  B DPA:   30  M Waveforms: T=Triphasic, M=Monophasic, B=Biphasic Stent Velocities: Stent 1: Right Leg Stent location: SFA MID      Velocity    Waveform Inflow Artery: SFA MID       Proximal to Stent:   189   T Proximal Stent:   247  T Mid Stent:   201  T Distal Stent:   153  T Distal to Stent:   179  T Outflow Artery: SFA MID      Comments: Impression: Widely patent flow through right leg with resolution of prior Glenn's canal critical stenosis by placement of stent. Right Lower Extremity: Widely patent flow through the  right arterial system with normal waveforms throughout.  Widely patent SFA stent.  Increased velocities in proximal stent do not correlate with visible narrowing on B-mode imaging.  Area of prior severe stenosis no longer visualized. Left Lower Extremity: Not formally evaluated Reference: Category Normal 1-19% 20-49% 50-99% Occluded PSV <160 cm/sec without spectral broadening <160 cm/sec with spectral broadening Increased Increased Absent Flow Ratio N/A N/A < 2.0 >2.0 N/A Post-Stenotic Turbulence No No No Yes N/A       I personally reviewed the images and my interpretation is that his arterial duplex shows a widely patent SFA stent now and normal ABIs..    LABS:      Sodium   Date Value Ref Range Status   06/18/2019 142 136 - 145 mmol/L Final   12/05/2018 139 136 - 145 mmol/L Final   02/14/2018 140 136 - 145 mmol/L Final     Potassium   Date Value Ref Range Status   05/22/2020 4.3 3.5 - 5.0 mmol/L Final   08/23/2019 4.4 3.5 - 5.0 mmol/L Final   06/18/2019 4.4 3.5 - 5.0 mmol/L Final     Chloride   Date Value Ref Range Status   06/18/2019 107 98 - 107 mmol/L Final   12/05/2018 103 98 - 107 mmol/L Final   02/14/2018 104 98 - 107 mmol/L Final     BUN   Date Value Ref Range Status   06/18/2019 16 8 - 22 mg/dL Final   12/05/2018 20 8 - 22 mg/dL Final   02/14/2018 20 8 - 22 mg/dL Final     Creatinine   Date Value Ref Range Status   05/22/2020 0.87 0.70 - 1.30 mg/dL Final   08/23/2019 0.97 0.70 - 1.30 mg/dL Final   06/18/2019 0.85 0.70 - 1.30 mg/dL Final     Hemoglobin   Date Value Ref Range Status   05/22/2020 14.4 14.0 - 18.0 g/dL Final   08/23/2019 15.6 14.0 - 18.0 g/dL Final   08/13/2019 15.4 14.0 - 18.0 g/dL Final     Platelets   Date Value Ref Range Status   05/22/2020 154 140 - 440 thou/uL Final   08/23/2019 161 140 - 440 thou/uL Final   08/13/2019 150 140 - 440 thou/uL Final     INR   Date Value Ref Range Status   05/22/2020 1.01 0.90 - 1.10 Final   08/23/2019 1.00 0.90 - 1.10 Final   03/30/2016 1.00 0.90 - 1.10  Final           Adry Mandel MD  VASCULAR SURGERY

## 2021-06-08 NOTE — PROGRESS NOTES
"Dung Negrete is a 65 y.o. male who is being evaluated via a billable video visit.      The patient has been notified of following:     \"This video visit will be conducted via a call between you and your physician/provider. We have found that certain health care needs can be provided without the need for an in-person physical exam.  This service lets us provide the care you need with a video conversation.  If a prescription is necessary we can send it directly to your pharmacy.  If lab work is needed we can place an order for that and you can then stop by our lab to have the test done at a later time.    Video visits are billed at different rates depending on your insurance coverage. Please reach out to your insurance provider with any questions.    If during the course of the call the physician/provider feels a video visit is not appropriate, you will not be charged for this service.\"    Patient has given verbal consent to a Video visit? Yes    Patient would like to receive their AVS by AVS Preference: Raisa.    Patient would like the video invitation sent by: Text to cell phone: [phone]    Will anyone else be joining your video visit? No            PAD follow up. Angio had to be cancelled due to COVID. See how pt is doing. Prior right SFA angioplasty. At last visit, there was concern for restenosis. Complains of some right outer ankle like a numb feeling.       Video-Visit Details  Video Start Time: 9:01am  Type of service:  Video Visit    Video End Time (time video stopped):9:08am    Originating Location (pt. Location): Home    Distant Location (provider location):  [facility] [place]      Platform used for Video Visit: Liat"

## 2021-06-08 NOTE — PROGRESS NOTES
VASCULAR SURGERY OUTPATIENT CONSULT OR VISIT   VASCULAR SURGEON: Adry Mandel MD    LOCATION:  [facility]    This is a virtual visit performed with video    Dung Negrete   Medical Record #:  [id number]  YOB: 1955  Age:  65 y.o.     Date of Service: 5/13/2020    PRIMARY CARE PROVIDER: Elmo Crump MD      Reason for consultation: Follow-up of peripheral vascular disease    IMPRESSION: Patient to had had right SFA simple balloon angioplasty by me for very short distance claudication and was much improved. Returned with mild recurrence of claudication and significantly stenosed SFA to greater than 70% on duplex imaging.  Scheduled for repeat and intervention to prevent occlusion.  This was canceled due to covid-19.  Symptoms largely stable although in the last 2 weeks has been having some numbness in his foot.  May have had covid-19, as he had a bad cough in early March as did his wife.    RECOMMENDATION: Tentative plan for right leg angiointervention with possible use of drug-eluting balloon or stent.  We will get this done soon to prevent progression to complete occlusion.  We will get covid-19 testing 2 days before.  H&P morning of procedure    HPI:  Dung Negrete is a 65 y.o. male who was seen today in follow-up for his peripheral vascular disease.  This is a patient who I treated with simple balloon angioplasty of the SFA for very short distance claudication of his right leg.  This was in August 2019.  Dramatic improvement immediately.  Has been doing well.  On surveillance, however, we noted development of restenosis early on at [facility].  Plan for Angiointervention which was canceled due to covid-19.  In the intervening time he has been doing quite well.  Still has some degree of claudication.      In the last 2 weeks he developed some numbness in his right foot without any skin changes.  Uncertain what the causes.  Not like his symptoms of gout.  His primary care  doctor gave him 5 days of steroids for this but it has not helped.  Patient used to take indomethacin for his gout but his primary care provider recommended he not take it given that he is on aspirin and Plavix.    Patient tells me that in early March she had a very very severe cough and fever and his wife was somewhat less sick but also had a cough.  He is therefore not sure if he already had covid-19.    No other new health issues.  No chest pains or shortness of breath currently.  No new urinary or GI complaints.  Would like to forward with his angiointervention.  Discussed with me whether we should put stents this time and we spent some time around this.    PHH:    Past Medical History:   Diagnosis Date     Cellulitis and abscess of leg 8/31/2006     Dyspnea 5/27/2015     Enthesopathy of knee 8/31/2006     GERD (gastroesophageal reflux disease) 1/8/2017     Health examination of defined subpopulation 12/27/2005     History of total hip arthroplasty, left 12/28/2018     Mixed hyperlipidemia     Created by Conversion      Neck Sprain     Created by Conversion      Polyarthralgia 5/17/2017     Primary osteoarthritis involving multiple joints 5/17/2017        Past Surgical History:   Procedure Laterality Date     ANGIOPLASTY Left 2007     IR EXTREMITY ANGIOGRAM RIGHT  8/23/2019     TOTAL HIP ARTHROPLASTY Left 12/28/2019     TOTAL KNEE ARTHROPLASTY Right 04/13/2016       ALLERGIES:  Patient has no known allergies.    MEDS:    Current Outpatient Medications:      aspirin 81 MG EC tablet, Take 81 mg by mouth daily., Disp: , Rfl:      celecoxib (CELEBREX) 100 MG capsule, TAKE 1 CAPSULE BY MOUTH EVERY DAY, Disp: 90 capsule, Rfl: 3     cholecalciferol, vitamin D3, (VITAMIN D3) 400 unit cap, Take 1 capsule by mouth daily., Disp: , Rfl:      clopidogrel (PLAVIX) 75 mg tablet, Take 1 tablet (75 mg total) by mouth daily., Disp: 90 tablet, Rfl: 0     indomethacin (INDOCIN) 50 MG capsule, Take 1 capsule (50 mg total) by mouth 3  "(three) times a day with meals., Disp: 30 capsule, Rfl: 0     lansoprazole (PREVACID) 30 MG capsule, TAKE 1 CAPSULE BY MOUTH EVERY DAY, Disp: 90 capsule, Rfl: 2     loratadine 5 mg TbDL, Take 5 mg by mouth daily., Disp: , Rfl:      MULTIVITAMIN ORAL, Take 1 tablet by mouth daily., Disp: , Rfl:      simvastatin (ZOCOR) 40 MG tablet, Take 1 tablet (40 mg total) by mouth daily., Disp: 90 tablet, Rfl: 3    SOCIAL HABITS:    Social History     Tobacco Use   Smoking Status Former Smoker     Packs/day: 1.00     Years: 39.00     Pack years: 39.00     Types: Cigarettes     Last attempt to quit: 2014     Years since quittin.9   Smokeless Tobacco Former User     Quit date: 2005       Social History     Substance and Sexual Activity   Alcohol Use Yes    Comment: occasional on weekends       Social History     Substance and Sexual Activity   Drug Use Yes     Types: Marijuana       FAMILY HISTORY:    Family History   Problem Relation Age of Onset     No Medical Problems Mother      Heart disease Father      Hypertension Father      Diabetes Sister      Heart disease Brother      Diabetes Brother      Heart attack Brother      Diabetes Sister        REVIEW OF SYSTEMS:    A 12 point ROS was reviewed and except for what is listed in the HPI above, all others are negative    PE:  Ht 5' 11\" (1.803 m)   Wt 212 lb (96.2 kg)   BMI 29.57 kg/m    Wt Readings from Last 1 Encounters:   20 212 lb (96.2 kg)     Body mass index is 29.57 kg/m .    EXAM:  Evaluation limited as this is a video visit    GENERAL: This is a well-developed 65 y.o. male who appears his stated age  EYES: Grossly normal.  MOUTH: Buccal mucosa normal   MUSCULOSKELETAL: Grossly normal and both lower extremities are intact.  HEME/LYMPH: No lymphedema  NEUROLOGIC: Focally intact, Alert and oriented x 3.   PSYCH: appropriate affect  INTEGUMENT: No open lesions or ulcers    DIAGNOSTIC STUDIES:     Images:  No results found.    LABS:      Sodium   Date Value " Ref Range Status   06/18/2019 142 136 - 145 mmol/L Final   12/05/2018 139 136 - 145 mmol/L Final   02/14/2018 140 136 - 145 mmol/L Final     Potassium   Date Value Ref Range Status   08/23/2019 4.4 3.5 - 5.0 mmol/L Final   06/18/2019 4.4 3.5 - 5.0 mmol/L Final   12/05/2018 4.4 3.5 - 5.0 mmol/L Final     Chloride   Date Value Ref Range Status   06/18/2019 107 98 - 107 mmol/L Final   12/05/2018 103 98 - 107 mmol/L Final   02/14/2018 104 98 - 107 mmol/L Final     BUN   Date Value Ref Range Status   06/18/2019 16 8 - 22 mg/dL Final   12/05/2018 20 8 - 22 mg/dL Final   02/14/2018 20 8 - 22 mg/dL Final     Creatinine   Date Value Ref Range Status   08/23/2019 0.97 0.70 - 1.30 mg/dL Final   06/18/2019 0.85 0.70 - 1.30 mg/dL Final   12/05/2018 0.87 0.70 - 1.30 mg/dL Final     Hemoglobin   Date Value Ref Range Status   08/23/2019 15.6 14.0 - 18.0 g/dL Final   08/13/2019 15.4 14.0 - 18.0 g/dL Final   12/05/2018 14.5 14.0 - 18.0 g/dL Final     Platelets   Date Value Ref Range Status   08/23/2019 161 140 - 440 thou/uL Final   08/13/2019 150 140 - 440 thou/uL Final   12/05/2018 181 140 - 440 thou/uL Final     INR   Date Value Ref Range Status   08/23/2019 1.00 0.90 - 1.10 Final   03/30/2016 1.00 0.90 - 1.10 Final         This was a virtual visit through video that started at 9:01 and ended at 9:08 AM    Adry Mandel MD  VASCULAR SURGERY

## 2021-06-08 NOTE — TELEPHONE ENCOUNTER
Discussed with patient angiogram appointment with Dr. Mandel. Reviewed over prep instructions and follow up. Will also send to [facility] as well. Patient understands instructions. He understands to get COVID test prior to procedure.     Procedure: Right leg Angiogram    Date: 5/22/2020 FRIDAY    Arrival Time: 7AM    Procedure Time: 8AM    Location: [facility]

## 2021-06-08 NOTE — PROGRESS NOTES
US BEFORE. 2WKS POST RIGHT LEG ANGIO 5/22/20. He is having a gout flare limiting his walking. He says that the walking has improved some.

## 2021-06-08 NOTE — PATIENT INSTRUCTIONS - HE
Your symptoms show that you may have coronavirus (COVID-19). This illness can cause fever, cough and trouble breathing. Many people get a mild case and get better on their own. Some people can get very sick.     Not all patients are tested for COVID-19. If you need to be tested, your care team will let you know.     How can I protect others?    Without a test, we can't know for sure that you have COVID-19. For safety, it's very important to follow these rules.    Stay home and away from others (self-isolate) until:    At least 10 days have passed since your symptoms started. And     You've had no fever--and no medicine that reduces fever--for 3 full days (72 hours). And      Your other symptoms have resolved (gotten better).     During this time:    Stay in your own room (and use your own bathroom), if you can.    Stay away from others in your home. No hugging, kissing or shaking hands.    No visitors.    Don't go to work, school or anywhere else.     Clean  high touch  surfaces often (doorknobs, counters, handles, etc.). Use a household cleaning spray or wipes.    Cover your mouth and nose with a mask, tissue or wash cloth to avoid spreading germs.    Wash your hands and face often. Use soap and water.    For more tips, go to https://www.cdc.gov/coronavirus/2019-ncov/downloads/10Things.pdf.    How can I take care of myself?    1. Get lots of rest. Drink extra fluids (unless a doctor has told you not to).     2. Take Tylenol (acetaminophen) for fever or pain. If you have liver or kidney problems, ask your family doctor if it's okay to take Tylenol.     Adults can take either:     650 mg (two 325 mg pills) every 4 to 6 hours, or     1,000 mg (two 500 mg pills) every 8 hours as needed.     Note: Don't take more than 3,000 mg in one day.   Acetaminophen is found in many medicines (both prescribed and over-the-counter medicines). Read all labels to be sure you don't take too much.   For children, check the Tylenol  bottle for the right dose. The dose is based on the child's age or weight.    3. If you have other health problems (like cancer, heart failure, an organ transplant or severe kidney disease): Call your specialty clinic if you don't feel better in the next 2 days.    4. Know when to call 911: If your breathing is so bad that it keeps you from doing normal activities, call 911 or go to the emergency room. Tell them that you've been staying home and may have COVID-19.      What are the symptoms of COVID-19?     The most common symptoms are cough, fever and trouble breathing.     Less common symptoms include body aches, chills, diarrhea (loose, watery poops), fatigue (feeling very tired), headache, runny nose, sore throat and loss of smell.     COVID-19 can cause severe coughing (bronchitis) and lung infection (pneumonia).    How does it spread?     The virus may spread when a person coughs or sneezes into the air. The virus can travel about 6 feet this way, and it can live on surfaces.      Common  (household disinfectants) will kill the virus.    Who is at risk?  Anyone can catch COVID-19 if they're around someone who has the virus.    How can others protect themselves?     Stay away from people who have COVID-19 (or symptoms of COVID-19).    Wash hands often with soap and water. Or, use hand  with at least 60% alcohol.    Avoid touching the eyes, nose or mouth.     Wear a face mask when you go out in public, when sick or when caring for a sick person.      For more about COVID-19 and caring for yourself at home, please visit the CDC website at https://www.cdc.gov/coronavirus/2019-ncov/about/steps-when-sick.html.     To learn about care at [facility], go to [email or web address].    Below are the COVID-19 hotlines at the [place] [facility] ([facility]). Interpreters are available.     For health questions: Call 391-951-9849 or 1-581.290.2043 (7 a.m. to 7  p.m.)    For questions about schools and childcare: Call 579-021-1343 or 1-356.632.6223 (7 a.m. to 7 p.m.)

## 2021-06-08 NOTE — PRE-PROCEDURE
Procedure Name: Right Lower Extremity Angiogram, diagnostic, possible intervention, with monitoring and sedation.  Date/Time: 5/22/2020 7:35 AM    Verbal consent obtained?: Yes  Written consent obtained?: Yes  Risks and benefits: Risks, benefits and alternatives were discussed  Discharge plan: Appropriate discharge home plan in place for patients who are going home after procedure   Consent given by: patient  Expected level of sedation: moderate  ASA Class: Class 2- mild systemic disease, no acute problems, no functional limitations  Mallampati: Grade 2- soft palate, base of uvula, tonsillar pillars, and portion of posterior pharyngeal wall visible  Patient states understanding of procedure being performed: Yes  Patient's understanding of procedure matches consent: Yes  Procedure consent matches procedure scheduled: Yes  Appropriately NPO: yes  Lungs: lungs clear with good breath sounds bilaterally  Heart: normal heart sounds and rate  History & Physical reviewed: History and physical reviewed and no updates needed  Statement of review: I have reviewed the lab findings, diagnostic data, medications, and the plan for sedation    Natalio Longoria  Vascular Surgery Fellow

## 2021-06-08 NOTE — PROGRESS NOTES
Assessment:      Healthy male exam.   Hyperlipidemia  Gastroesophageal reflux disease controlled on lansoprazole  Vitamin D deficiency on replacement therapy     Plan:       rroutine lab work will be done today also start a weight loss type diet. Both of these activities should help decrease cardiac risk.hhe is up-to-date on colonoscopy.       Subjective:      Dung Negrete is a 62 y.o. male who presents for an annual exam. The patient reports that there is not domestic violence in his life.  Overall, he is feeling well.  He has gained some weight and would like to lose some weight.  He continues on cholesterol medications and Prevacid.  A vitamin D supplement.  He recently had a colonoscopy and is up-to-date on that.  He needs a cholesterol today.  He occasionally will get back pain but recently not been better.  We discussed pneumococcal vaccine as well as Zostavax today.  The patient will check with his insurance to see if they cover Zostavax.    Healthy Habits:   Regular Exercise: No  Sunscreen Use: Yes  Healthy Diet: Yes  Dental Visits Regularly: Yes  Seat Belt: Yes  Sexually active: Yes  Monthly Self Testicular Exams:  No  Hemoccults: No  Flex Sig: No  Colonoscopy: Yes  Lipid Profile: Yes  Glucose Screen: Yes      Immunization History   Administered Date(s) Administered     DT (pediatric) 08/09/1998     Hep A, historic 08/25/2010, 07/03/2012     Td, historic 08/09/1998     Tdap 07/03/2012     Immunization status: up to date and documented.    No exam data present     Current Outpatient Prescriptions   Medication Sig Dispense Refill     aspirin 81 MG EC tablet Take 81 mg by mouth daily.       cholecalciferol, vitamin D3, (VITAMIN D3) 400 unit cap Take 1 capsule by mouth daily.       lansoprazole (PREVACID) 30 MG capsule TAKE 1 CAPSULE (30 MG TOTAL) BY MOUTH DAILY. 30 capsule 5     MULTIVITAMIN ORAL Take 1 tablet by mouth daily.       omeprazole (PRILOSEC) 20 MG capsule Take 20 mg by mouth daily.        "predniSONE (DELTASONE) 20 MG tablet TAKE 3 TABS DAILY FOR 3 DAYS, THEN 2 TABS DAILY FOR 3 DAYS, THEN 1 TABDAILY FOR 3 DAYS       simvastatin (ZOCOR) 40 MG tablet TAKE ONE TABLET BY MOUTH ONE TIME DAILY 60 tablet 2     No current facility-administered medications for this visit.      No past medical history on file.  No past surgical history on file.  Review of patient's allergies indicates no known allergies.  No family history on file.  Social History     Social History     Marital status:      Spouse name: N/A     Number of children: N/A     Years of education: N/A     Occupational History     Not on file.     Social History Main Topics     Smoking status: Former Smoker     Quit date: 6/1/2014     Smokeless tobacco: Former User     Quit date: 2/4/2005     Alcohol use Not on file     Drug use: Not on file     Sexual activity: Not on file     Other Topics Concern     Not on file     Social History Narrative       Review of Systems  Review of Systems   Constitutional: Negative.  Negative for fatigue and fever.   HENT: Negative.  Negative for congestion.    Eyes: Negative.    Respiratory: Negative.  Negative for cough and shortness of breath.    Cardiovascular: Negative.  Negative for chest pain.   Gastrointestinal: Negative.  Negative for constipation and diarrhea.   Endocrine: Negative.    Genitourinary: Negative.  Negative for dysuria and frequency.   Musculoskeletal: Negative.    Skin: Negative.    Allergic/Immunologic: Negative.    Neurological: Negative.    Hematological: Negative.    Psychiatric/Behavioral: Negative.              Objective:     Vitals:    02/01/17 0901 02/01/17 0902   BP: (!) 142/100 142/82   Pulse: 98    Resp: 16    Temp: 98.4  F (36.9  C)    TempSrc: Oral    Weight: 216 lb (98 kg)    Height: 5' 11\" (1.803 m)      Body mass index is 30.13 kg/(m^2).    Physical  Physical Exam   Constitutional: He is oriented to person, place, and time. He appears well-developed and well-nourished. No " distress.   HENT:   Right Ear: External ear normal.   Left Ear: External ear normal.   Mouth/Throat: Oropharynx is clear and moist.   Eyes: Conjunctivae and EOM are normal. Pupils are equal, round, and reactive to light.   Neck: Normal range of motion. Neck supple. No JVD present. No thyromegaly present.   Cardiovascular: Normal rate, regular rhythm and normal heart sounds.    No murmur heard.  Pulmonary/Chest: Effort normal and breath sounds normal. No respiratory distress.   Abdominal: Soft. Bowel sounds are normal. He exhibits no mass. There is no tenderness.   Genitourinary: Penis normal.   Genitourinary Comments: Testicles palpable in scrotum.  No masses noted.  No hernia present.  Prostate is generally enlarged but symmetrically sized.  No nodularity.  No tenderness noted.  Skin tags noted in anal area from past hemorrhoids.   Musculoskeletal: Normal range of motion. He exhibits no edema or tenderness.   Lymphadenopathy:     He has no cervical adenopathy.   Neurological: He is alert and oriented to person, place, and time. He has normal reflexes. No cranial nerve deficit.   Skin: Skin is warm and dry. No rash noted.   Psychiatric: He has a normal mood and affect.

## 2021-06-08 NOTE — PATIENT INSTRUCTIONS - HE
Ultrasound    Thank you for choosing [facility] as partners in your care.  Please read the following information before your appointment.  Feel free to ask questions.    An ultrasound is an exam that uses sound waves to create pictures.  The special camera that takes the pictures is called a transducer.  An ultrasound technologist will perform the exam under the direction of a physician.    Preparation  Ultrasound preps vary.  If you are scheduled for an Aorta Ultrasound, please do not eat or drink anything 8 hours before your exam.  You may take daily medications with a small sip of water.  There is no preparation required for any of the other ultrasound exams performed at [facility].    The Examination  You may or may not need to change clothes for your exam.  The technologist will explain the exam to you.  He or she will ask you a few questions and answer any questions you may have.    You will lie on a table and a gel will be applied to your skin.  A small handheld instrument called a transducer will be moved across the area we are looking at while pictures are taken.  Also, your exam may include measuring your blood pressure on your arms, legs and/or toes.    When the Exam Is Completed  Your physician will receive the results of the exam and explain them to you.  You may return to your normal diet and activities unless otherwise directed by your doctor.  Feel free to ask questions if something is not clear to you.  We welcome comments.    At [facility], we are dedicated to providing the best possible care.  Thank you for taking time to read these instructions.  We hope your experience is as pleasant as possible.      You are scheduled for the following exam(s):    []Carotid Duplex:  Evaluates the carotid arteries in the neck that feed the brain with blood.  Gel is applied to the skin of the neck.  A transducer will be placed on the gel covered areas to obtain images and evaluate  and listen to the blood flow in the arteries.  This exam takes approximately 30 minutes.    []Venous Duplex:  Evaluates the veins that carry blood to the heart from the arms and/or legs.  Gel is applied to the skin of the arms and/or legs.  A transducer will be placed on the gel covered areas to obtain images and evaluate flow in the veins.  This exam takes approximately 30 minutes per arm/leg.    [x]Arterial Duplex:  Evaluates the arteries that feed the arms and legs with blood.  Gel is applied to the skin of the arms and/or legs.  A transducer will be placed on the gel covered areas to obtain images and listen to the blood flow in the arms and/or legs.  This exam takes approximately 30 minutes per arm/leg.    [x]MACK or Segmental Pressures:  Ultrasound and blood pressure cuffs are used to evaluate the arteries that supply the arms and legs with blood.  Several blood pressure cuffs will be place on your arms and legs.  When inflated, the cuffs will provide blood pressure readings that are used to determine where blockages in the arteries may be.  You may be asked to do a moderate level of exercise during this exam.  This exam takes approximately 30-60 minutes.    []Aorta:  Evaluates the abdominal aorta for blockages and aneurysm.  Gel is applied to the stomach.  A transducer will be placed on the gel covered areas to obtain images of the aorta.  This exam takes approximately 30 minutes.    Prep instructions:  Do not eat or drink anything 8 hours before procedure.  You may take daily medications with a small sip of water.

## 2021-06-08 NOTE — PROGRESS NOTES
Pre-Procedure Negative COVID Test     Dung Negrete  COVID-19 PCR Results    COVID-19 PCR Results 5/20/20 2019-nCOV Not Detected      Comments are available for some flowsheets but are not being displayed.             Patient Notification  Patient notified of the negative COVID test result    Patient Information  Patient informed of the no visitor policy  Patient instructed to continue to self-quarantine prior to procedure  Patient informed to contact the ordering provider if the following symptoms develop prior to procedure. Dr. Mandel is aware okay to proceed with procedure.  Fever  Cough  Shortness of Breath  Sore throat   Runny or stuffy nose  Muscle or body aches  Headaches  Fatigue  Vomiting or diarrhea   Rash    Sommer Soliman

## 2021-06-08 NOTE — TELEPHONE ENCOUNTER
Wellness Screening Tool  Symptom Screening:  Do you have a:    Fever?  no    Cough?  no    Shortness of breath?  no  ? If yes, is this a change? no    Skin rash?  no  Within the past 14 days, have you been in contact with someone who:    Is currently being ruled out for COVID-19 (novel coronavirus)?  no    Has tested positive for COVID-19?  no    Has symptoms of a respiratory illness (fever, cough, shortness of breath)?  no  Have you recently traveled to an area with COVID-19 areas: no    Refer to the [facility] Coronavirus webpage for COVID-19 areas:    Within the past 3 weeks, have you been exposed to the following:    Pertussis?  no    Chicken pox?  no    Measles? no  Patient's appointment status: Pt to come to clinic for visit  Patient reminded that no visitors are allowed at this time due to COVID-19 concerns. If determined pt has symptoms and is still needed to be seen pt notified they must wear a mask upon entering the clinic.

## 2021-06-08 NOTE — OP NOTE
[facility] Interventional Radiology vascular surgery procedure    Date: 05/22/20    Procedures Performed:  Ir Extremity Angiogram Right from the aorta level via left groin approach    Right drug-eluting SFA stent placement    Ultrasound-guided vascular access    Selective right leg angiogram with distalmost catheter position in greater than third order popliteal artery.  Note that this is included within the stent code    Provider:  Adry Mandel MD   Assistant:  Natalio Longoria MD, vascular surgery fellow    Indication:  This is a 65-year-old gentleman who I treated in the last year for short distance claudication with simple balloon angioplasty of a focal SFA lesion.  On surveillance duplex imaging now has critical stenosis of that area and some recurrence of his claudication.  Plan for repeat intervention with plan to use a drug-eluting therapy for increased durability.    Sedation:  Moderate Sedation: The procedure was performed with administration of intravenous conscious sedation with appropriate preoperative, intraoperative, and postoperative evaluation.  71 minutes of supervised face to face intraservice time was provided by a radiology nurse under my direct supervision.  Versed 4 mg, fentanyl 150 micrograms.    Antibiotics:  None    Fluoroscopic Time:  16.2 Minutes    Radiation Dose:  553 mGy    Contrast:  65 cc Visipaque    Procedure:    Ultrasound was used to evaluate the left groin.  The selected vessel was the left common femoral artery which was widely patent and of good caliber.  Small areas of focal calcification but otherwise healthy for access. Ultrasound was then used for real-time ultrasound guided needle entry of the common femoral artery. Permanent images were recorded and saved to the patient's medical record.    Micropuncture technique was used to deliver a 4 Slovak sheath this in turn was used to advance of 4 Slovak Omni Flush catheter to the lower abdominal aorta and an initial  aortogram was performed    Findings: Patent distal abdominal aorta, common iliac arteries, internal iliac arteries, external iliac arteries and common femoral arteries bilaterally    Wire catheter advanced to the common femoral level on the right side and selective right leg angiogram was performed    Findings: Patent profunda femoris artery.  Largely patent SFA with only irregularity and no important stenosis down to the level of Glenn's canal..  There was a greater than 80% focal stenosis of approximately 1-1/2 cm in length preceded by a tapering of approximately 2 cm in length.  Beyond this the popliteal artery was widely patent and there was three-vessel tibial runoff with a patent pedal arch.    This point the patient was heparinized and a 6 Qatari Ansell sheath was exchanged in.  CXI catheter and stiff straight Glidewire were used to advance across the past the lesion into the above-knee popliteal artery.  A selective angiogram was performed from this level through the catheter    Findings: Intraluminal location of the catheter in the above-knee popliteal artery past the stenosis.    A Louis wire was now exchanged in and over this a 6 mm x 40 mm Zilver PTX stent was exchanged and deployed across the critical stenosis in the taper above it to completely treat the area of disease.  There was still a waist in the stent and so post ballooning of the stent was performed with a 5 mm x 4 cm angioplasty balloon taken up to 10 anil of pressure.  Completion angiogram was then performed from the sheath level    Findings: Widely patent flow through the SFA into Glenn's canal with no residual stenosis and no evidence of dissection or distal embolization.  Maintained pedal flow.    We now pulled a wire and sheath back and performed an angiogram of her access site which showed an adequate access for use of percutaneous closure device.  A 6 Qatari Angio-Seal was deployed without difficulty and a completion duplex confirmed  good positioning of the Angio-Seal on the anterior wall of the artery and good flow beyond it on duplex imaging.    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  Successful access site closure with Angio-Seal device.      Adry aMndel  Vascular Surgery

## 2021-06-08 NOTE — TELEPHONE ENCOUNTER
RN cannot approve Refill Request    RN can NOT refill this medication med is not covered by policy/route to provider. Last office visit: 3/19/2019 Elmo Crump MD Last Physical: 10/25/2019 Last MTM visit: Visit date not found Last visit same specialty: 3/19/2019 Elmo Crump MD.  Next visit within 3 mo: Visit date not found  Next physical within 3 mo: Visit date not found      Beata Thomas, [facility] Triage/Med Refill 5/30/2020    Requested Prescriptions   Pending Prescriptions Disp Refills     predniSONE (DELTASONE) 20 MG tablet [Pharmacy Med Name: PREDNISONE 20 MG TABLET] 5 tablet 5     Sig: TAKE 1 TABLET BY MOUTH DAILY FOR 5 DAYS       There is no refill protocol information for this order

## 2021-06-10 NOTE — PROGRESS NOTES
Assessment and Plan:     Dung was seen today for annual wellness visit, follow-up, gout and hypertension.    Mixed hyperlipidemia  -     Comprehensive Metabolic Panel; Future  -     Lipid Cascade; Future    Gout  -     Uric Acid; Future  -     predniSONE (DELTASONE) 5 MG tablet; Take 20 mg by mouth daily for 4 days, THEN 10 mg daily for 4 days, THEN 5 mg daily.  -     allopurinoL (ZYLOPRIM) 100 MG tablet; Take 1 tablet (100 mg total) by mouth daily. Start while taking prednisone 5 mg, overlap with prednisone for 30 days    Essential hypertension  -     lisinopriL (PRINIVIL,ZESTRIL) 10 MG tablet; Take 1 tablet (10 mg total) by mouth daily.    Encounter for general adult medical examination with abnormal findings    Primary osteoarthritis involving multiple joints    PVD (peripheral vascular disease) (H)    Lumbar radiculopathy        The patient's current medical problems were reviewed.    I have had an Advance Directives discussion with the patient.  The following high BMI interventions were performed this visit: encouragement to exercise  The following health maintenance schedule was reviewed with the patient and provided in printed form in the after visit summary:   Health Maintenance   Topic Date Due     HEPATITIS C SCREENING  1955     MEDICARE ANNUAL WELLNESS VISIT  06/18/2020     FALL RISK ASSESSMENT  06/18/2020     INFLUENZA VACCINE RULE BASED (1) 08/01/2020     PNEUMOCOCCAL IMMUNIZATION 65+ LOW/MEDIUM RISK (2 of 2 - PPSV23) 03/19/2020     ADVANCE CARE PLANNING  02/14/2023     LIPID  06/18/2024     TD 18+ HE  02/19/2030     COLORECTAL CANCER SCREENING  02/26/2030     ZOSTER VACCINES  Completed     HEPATITIS B VACCINES  Aged Out        Subjective:   Chief Complaint: Dung Negrete is an 65 y.o. male here for an Annual Wellness visit.   HPI: His medical history includes vascular disease.  He has a history of peripheral vascular disease has recently had interventions with improvement in symptoms.  He  is on clopidogrel has follow-up scheduled with vascular disease specialist.    He has a history of gout.  Gout is most common in the right great toe.  He has had other locations including the her left foot and ankle as well as the right elbow.  He has historically utilized indomethacin for control.  Most recently because of being on the clopidogrel I have recommended prednisone to treat an acute attack.  Patient reports that he seems to be getting an acute attack in the right great toe currently.  Patient reports in the past he was on allopurinol but has not been on it for some time.  He is interested in restarting preventive medication and we spent some time today discussing this.    His blood pressure was noted to be elevated more often than not on recent review of measurements.  At times blood pressures and record at 120-140 but there are multiple occasions where it is even higher often between 150-180 systolic.  Today we discussed portance of blood pressure control and have elected to start medication therapy with lisinopril.    Is being seen by a spine specialist for lumbar radiculopathy with possibility of lumbar stenosis.  He is waiting on results of an MRI scan that was recently.    He otherwise overall feels well.  He is building a house up [place] and planning to sell his house here.    Reviewed routine health prevention.  Discussed prostate cancer screening in some depth.  Elected to hold off on use of PSA testing but will perform digital rectal exam.  Reviewed most recent colonoscopy report from February 2020.  Discussed immunizations a lot of uncertainty about immunizations.  His record indicates he has had a total of 4 of the new shingles vaccines.  He does not believe this to be true and believes he actually had been updated with Pneumovax vaccine.  We will research this to see if we can figure this out.    Review of Systems:   Please see above.  The rest of the review of systems are negative for all  systems.    Patient Care Team:  Elmo Crump MD as PCP - General (Family Medicine)  Elmo Crump MD as Assigned PCP     Patient Active Problem List   Diagnosis     Mixed hyperlipidemia     Gout     Neck Sprain     Dyspnea     GERD (gastroesophageal reflux disease)     Polyarthralgia     Primary osteoarthritis involving multiple joints     Cellulitis and abscess of leg     Enthesopathy of knee     Health examination of defined subpopulation     PVD (peripheral vascular disease) (H)     Benign neoplasm of descending colon     Hemorrhoids     History of colonic polyps     Polyp of colon     Past Medical History:   Diagnosis Date     Cellulitis and abscess of leg 8/31/2006     Dyspnea 5/27/2015     Enthesopathy of knee 8/31/2006     GERD (gastroesophageal reflux disease) 1/8/2017     Health examination of defined subpopulation 12/27/2005     History of total hip arthroplasty, left 12/28/2018     Mixed hyperlipidemia     Created by Conversion      Neck Sprain     Created by Conversion      Polyarthralgia 5/17/2017     Primary osteoarthritis involving multiple joints 5/17/2017      Past Surgical History:   Procedure Laterality Date     ANGIOPLASTY Left 2007     IR EXTREMITY ANGIOGRAM RIGHT  8/23/2019     IR EXTREMITY ANGIOGRAM RIGHT  5/22/2020     TOTAL HIP ARTHROPLASTY Left 12/28/2019     TOTAL HIP ARTHROPLASTY Right       Family History   Problem Relation Age of Onset     No Medical Problems Mother      Heart disease Father      Hypertension Father      Diabetes Sister      Heart disease Brother      Diabetes Brother      Heart attack Brother      Diabetes Sister       Social History     Socioeconomic History     Marital status:      Spouse name: Not on file     Number of children: Not on file     Years of education: Not on file     Highest education level: Not on file   Occupational History     Not on file   Social Needs     Financial resource strain: Not on file     Food insecurity     Worry: Not on  file     Inability: Not on file     Transportation needs     Medical: Not on file     Non-medical: Not on file   Tobacco Use     Smoking status: Former Smoker     Packs/day: 1.00     Years: 39.00     Pack years: 39.00     Types: Cigarettes     Last attempt to quit: 2014     Years since quittin.2     Smokeless tobacco: Former User     Quit date: 2005   Substance and Sexual Activity     Alcohol use: Yes     Comment: occasional on weekends     Drug use: Yes     Types: Marijuana     Sexual activity: Yes     Partners: Female   Lifestyle     Physical activity     Days per week: Not on file     Minutes per session: Not on file     Stress: Not on file   Relationships     Social connections     Talks on phone: Not on file     Gets together: Not on file     Attends Cheondoism service: Not on file     Active member of club or organization: Not on file     Attends meetings of clubs or organizations: Not on file     Relationship status: Not on file     Intimate partner violence     Fear of current or ex partner: Not on file     Emotionally abused: Not on file     Physically abused: Not on file     Forced sexual activity: Not on file   Other Topics Concern     Not on file   Social History Narrative     Not on file      Current Outpatient Medications   Medication Sig Dispense Refill     aspirin 81 MG EC tablet Take 81 mg by mouth daily.       baclofen (LIORESAL) 10 MG tablet TAKE 1 TABLET BY MOUTH THREE TIMES A DAY AS NEEDED FOR MUSCLE SPASMS       celecoxib (CELEBREX) 100 MG capsule TAKE 1 CAPSULE BY MOUTH EVERY DAY 90 capsule 3     cholecalciferol, vitamin D3, (VITAMIN D3) 400 unit cap Take 1 capsule by mouth daily.       clopidogrel (PLAVIX) 75 mg tablet Take 1 tablet (75 mg total) by mouth daily. 90 tablet 0     gabapentin (NEURONTIN) 100 MG capsule TAKE 1 CAPSULE IN THE MORNING AND 2 CAPSULES IN THE EVENING.       lansoprazole (PREVACID) 30 MG capsule TAKE 1 CAPSULE BY MOUTH EVERY DAY 90 capsule 2     loratadine 5  "mg TbDL Take 5 mg by mouth daily.       MULTIVITAMIN ORAL Take 1 tablet by mouth daily.       simvastatin (ZOCOR) 40 MG tablet TAKE 1 TABLET BY MOUTH EVERY DAY 90 tablet 0     allopurinoL (ZYLOPRIM) 100 MG tablet Take 1 tablet (100 mg total) by mouth daily. Start while taking prednisone 5 mg, overlap with prednisone for 30 days 90 tablet 3     lisinopriL (PRINIVIL,ZESTRIL) 10 MG tablet Take 1 tablet (10 mg total) by mouth daily. 90 tablet 3     predniSONE (DELTASONE) 5 MG tablet Take 20 mg by mouth daily for 4 days, THEN 10 mg daily for 4 days, THEN 5 mg daily. 54 tablet 0     No current facility-administered medications for this visit.       Objective:   Vital Signs:   Visit Vitals  /80   Pulse 74   Ht 5' 11\" (1.803 m)   Wt 206 lb 14.4 oz (93.8 kg)   SpO2 99%   BMI 28.86 kg/m           VisionScreening:  No exam data present     PHYSICAL EXAM        General Appearance:    Alert, cooperative, no distress   Eyes:   No scleral icterus or conjunctival irritation       Ears:    Normal TM's and external ear canals, both ears   Throat:   Lips, mucosa, and tongue normal; teeth and gums normal   Neck:   Supple, symmetrical, trachea midline, no adenopathy;        thyroid:  No enlargement/tenderness/nodules   Lungs:     Clear to auscultation bilaterally, respirations unlabored, no wheezes or crackles   Heart:    Regular rate and rhythm,  No murmur   Abdomen:    Soft, no distention, no tenderness on palpation, no masses, no organomegaly     Extremities:  No edema, no joint swelling or redness, no evidence of any injuries   Skin:  No concerning skin findings, no suspicious moles, no rashes   Neurologic:  On gross examination there is no motor or sensory deficit.  Patient walks with a normal gait     Genitalia:   Normal testicular anatomy, no inguinal hernias, no skin findings in the genital region   Rectal:    Normal tone, no hemorrhoids masses or other anal rectal findings, prostate has a smooth uniform consistency " without nodules                 Assessment Results 8/24/2020   Activities of Daily Living No help needed   Instrumental Activities of Daily Living No help needed   Mini Cog Total Score 5   Some recent data might be hidden     A Mini-Cog score of 0-2 suggests the possibility of dementia, score of 3-5 suggests no dementia    Identified Health Risks:     He is at risk for lack of exercise and has been provided with information to increase physical activity for the benefit of his well-being.  The patient was counseled and encouraged to consider modifying their diet and eating habits. He was provided with information on recommended healthy diet options.  Information regarding advance directives (living deg[place]), including where he can download the appropriate form, was provided to the patient via the AVS.

## 2021-06-10 NOTE — TELEPHONE ENCOUNTER
Refill Approved    Rx renewed per Medication Renewal Policy. Medication was last renewed on 6/18/19.    Mackenzie Gardner, [facility] Triage/Med Refill 8/13/2020     Requested Prescriptions   Pending Prescriptions Disp Refills     simvastatin (ZOCOR) 40 MG tablet [Pharmacy Med Name: SIMVASTATIN 40 MG TABLET] 90 tablet 2     Sig: TAKE 1 TABLET BY MOUTH EVERY DAY       Statins Refill Protocol (Hmg CoA Reductase Inhibitors) Passed - 8/13/2020  6:22 PM        Passed - PCP or prescribing provider visit in past 12 months      Last office visit with prescriber/PCP: 3/19/2019 Elmo Crump MD OR same dept: Visit date not found OR same specialty: 3/19/2019 Elmo Crump MD  Last physical: 10/25/2019 Last MTM visit: Visit date not found   Next visit within 3 mo: Visit date not found  Next physical within 3 mo: Visit date not found  Prescriber OR PCP: Elmo Crump MD  Last diagnosis associated with med order: 1. Hyperlipidemia  - simvastatin (ZOCOR) 40 MG tablet [Pharmacy Med Name: SIMVASTATIN 40 MG TABLET]; TAKE 1 TABLET BY MOUTH EVERY DAY  Dispense: 90 tablet; Refill: 2    If protocol passes may refill for 12 months if within 3 months of last provider visit (or a total of 15 months).

## 2021-06-10 NOTE — PROGRESS NOTES
ASSESSMENT AND PLAN:  Dung Negrete 62 y.o. male is seen here on 05/17/17 for evaluation of polyarthralgias likely inflammatory arthritis.  Many's symptoms have signal that this very likely may well be gout.  He also however recalls it as a child he was told that he had psoriasis.  Further workup is indicated.  We will start off with labs as noted and x-rays.  I will outlined to him the patients who do have gout there are essentially 2 ways to approach one is to treat as and when the episodes happen and the other is to try to prevent in the longer term.  At this time we both agreed that if the x-rays do not show significant erosions associated with gout, and in that case we will then wait and watch showed the next episode happened will revisit the idea of how to approach that.  He is aware now that finding a normal serum uric acid during an acute attack of gout is less reliable.  These will be tested again.  X-rays of the feet done today personally reviewed, intact joint spaces, early osteoarthritic changes in the first MTPs, without evidence of typical erosions one will see in a patient with this gouty changes.  We will meet here in the next the next 2-3 months or sooner if needed.  I have given him a course of prednisone in case this happens again such as if he is on vacation.  Major side effects of steroids including ocular metabolic bone-related were reviewed.  Diagnoses and all orders for this visit:    Gout  -     XR Feet Bilateral 3 Or More VWS; Future; Expected date: 5/17/17  -     XR Knees Bilateral 1 Or 2 VWS; Future; Expected date: 5/17/17  -     HM1(CBC and Differential)  -     Creatinine  -     ALT (SGPT)  -     Albumin  -     Rheumatoid Factor Quant  -     CCP Antibodies  -     Uric Acid  -     Erythrocyte Sedimentation Rate  -     C-Reactive Protein  -     HM1 (CBC with Diff)    Polyarthralgia  -     XR Feet Bilateral 3 Or More VWS; Future; Expected date: 5/17/17  -     XR Knees Bilateral 1 Or 2  VWS; Future; Expected date: 5/17/17  -     HM1(CBC and Differential)  -     Creatinine  -     ALT (SGPT)  -     Albumin  -     Rheumatoid Factor Quant  -     CCP Antibodies  -     Uric Acid  -     Erythrocyte Sedimentation Rate  -     C-Reactive Protein  -     HM1 (CBC with Diff)    Primary osteoarthritis involving multiple joints      HISTORY OF PRESENTING ILLNESS:  Dung Negrete, 62 y.o., male is here for evaluation of joint pains.  He is a retired .  He reports that recently he had an episode where he was hurting in his knees first on the right side then involvement of the feet and at the opposite knee as well.  And this whole episode has not resolved.  Going back he recalls he may have been in his 20s when first started hurting.  Most of his episodes would happen in his feet typically in the ball of the big toe area.  Mostly in the left side but sometimes the right foot 2.  The typical episode would come on suddenly.  There would be warmth redness and swelling of the joint and he would be hardly able to touch that.  These episodes would last for a few days to a week or so.  Typically the episode would be shortened if he were to take medication.  At one time he recalls going to an orthopedic doctor we gave him what sounds like indomethacin and within a matter of a day or 2 his symptoms subsided.  In between these episodes he had remained virtually pain-free until recently when it sounds like osteoarthritis is had set in.  He did have a right hip replacement.  Part of his job had been very demanding and he feels that has added to the wear and tear.  Right now is pain-free virtually as he sits here today and he has none of those acute pains although he gets aches and pains otherwise.  He has not had kidney stones his dad had gout.  He has noted history of psoriasis as a child that this affected his face, elbow areas and the knees.  He has not had recurrence of that.  At the peak of his pain it can be  "severe.  He has noted alcohol 6-8 times per week.  In the past he used to smoke quit 3 4 years ago he had hip replacement on the right side in 2016.  He remembers when they were trying to do the spinal it was hard for them to find a place to put in as they were \"a lot of arthritis \"..   Further historical information and ADL limitations as noted in the multidimensional health assessment questionnaire attached in the EMR. Rest of the 13 system ROS is negative.     ALLERGIES:Review of patient's allergies indicates no known allergies.    PAST MEDICAL/ACTIVE PROBLEMS/MEDICATION/ FAMILY HISTORY/SOCIAL DATA:  The patient has a family history of  No past medical history on file.  History   Smoking Status     Former Smoker     Quit date: 6/1/2014   Smokeless Tobacco     Former User     Quit date: 2/4/2005     Patient Active Problem List   Diagnosis     Hyperlipidemia     Gout     Neck Sprain     Dyspnea     GERD (gastroesophageal reflux disease)     Polyarthralgia     Current Outpatient Prescriptions   Medication Sig Dispense Refill     aspirin 81 MG EC tablet Take 81 mg by mouth daily.       cholecalciferol, vitamin D3, (VITAMIN D3) 400 unit cap Take 1 capsule by mouth daily.       lansoprazole (PREVACID) 30 MG capsule TAKE 1 CAPSULE (30 MG TOTAL) BY MOUTH DAILY. 30 capsule 5     MULTIVITAMIN ORAL Take 1 tablet by mouth daily.       simvastatin (ZOCOR) 40 MG tablet TAKE ONE TABLET BY MOUTH ONE TIME DAILY 90 tablet 3     No current facility-administered medications for this visit.        COMPREHENSIVE EXAMINATION:  Vitals:    05/17/17 1528   BP: 158/78   Pulse: 72   Weight: 206 lb (93.4 kg)     A well appearing alert oriented male. Vital data as noted above. His eyes without inflammation/scleromalacia. ENTwithout oral mucositis, thrush, nasal deformity, external ear redness, deformity. His neck is without lymphadenopathy and supple. Lungs normal sounds, no pleural rub. Heart auscultation normal rate, rhythm; no pericardial " rub and murmurs. Abdomen soft, non tender, no organomegaly. Skin examined for heliotrope, malar area eruption, lupus pernio, periungual erythema, sclerodactyly, papules, erythema nodosum, purpura, nail pitting, onycholysis, and obvious psoriasis lesion. Neurological examination shows normal alertness, speech, facial symmetry, tone and power in upper and lower extremities, Tinel's and Phalen's at wrist and gait. The joint examination is performed for swelling, tenderness, warmth, erythema, and range of motion in the following joints: DIPs, PIPs, MCPs, wrists, first CMC's, elbows, shoulders, hips, knees, ankles, feet; spine for range of motion and paraspinal muscles for tenderness. The salient normal / abnormal findings are appended.  He has no tophi that are identifiable.  He has Heberden nodes.  He does not have tenderness or acute swelling of any of his palpable appendicular joint.  He does not have nail pitting Onikul lysis.  No psoriatic lesions such as on the elbows knees behind the ears scalp.    LAB / IMAGING DATA:  ALT   Date Value Ref Range Status   02/01/2017 47 (H) 0 - 45 U/L Final   05/27/2015 40 0 - 45 U/L Final   09/18/2013 27 <46 IU/L Final     Albumin   Date Value Ref Range Status   02/01/2017 4.2 3.5 - 5.0 g/dL Final   05/27/2015 4.1 3.5 - 5.0 g/dL Final   09/18/2013 4.1 3.5 - 5.0 g/dL Final     Creatinine   Date Value Ref Range Status   02/01/2017 0.85 0.70 - 1.30 mg/dL Final   03/30/2016 0.81 0.70 - 1.30 mg/dL Final   05/27/2015 0.81 0.70 - 1.30 mg/dL Final       WBC   Date Value Ref Range Status   02/01/2017 8.1 4.0 - 11.0 thou/uL Final   03/30/2016 7.7 4.0 - 11.0 thou/uL Final   05/27/2015 6.9 4.0 - 11.0 thou/uL Final     Hemoglobin   Date Value Ref Range Status   02/01/2017 15.6 14.0 - 18.0 g/dL Final   03/30/2016 15.6 14.0 - 18.0 g/dL Final   05/27/2015 15.6 14.0 - 18.0 g/dL Final     Platelets   Date Value Ref Range Status   02/01/2017 169 140 - 440 thou/uL Final   03/30/2016 155 140 - 440  thou/uL Final   05/27/2015 157 140 - 440 thou/uL Final       No results found for: CLAUDIO, RF, SEDRATE

## 2021-06-10 NOTE — PROGRESS NOTES
Subjective:   Dung comes in today complaining of joint pain.  Over the last month his right knee then his right foot and now his left foot have been paining.  The pain seems to be migrating.  He has a history of gout he states.  Upon review of the record I do not have a uric acid level done over the last 7 years.  He states his gout was diagnosed when an orthopedist gave him indomethacin and his pains went away.  He has never had a joint aspiration.  He thinks his uric acid level was normal years ago when they checked it.  He has never seen a urologist.  His joint pain can get quite severe.  This time it seemed to wake him up at nighttime.  He denies any fevers, sweats or chills.  He has had no rash of any kind.      Objective:  Extremities: The right knee had minimal tenderness today.  No effusion was present.  The left foot had palpable tenderness over the metatarsal areas.  No erythema or increased warmth noted.  No swelling noted in the legs today.      Assessment:  1.  Arthritis.  Rule out gout.  Rule out inflammatory arthritis.      Plan:  Uric acid will be done today.  If this is normal I encouraged the patient to get an appointment with the rheumatologist to diagnose his exact type of arthritis.  I informed him this still could be gout but it is unusual if uric acid is normal.  He will use ibuprofen and Tylenol for pain control as that has been helping his pain.  If the uric acid is elevated we will start allopurinol after his acute flare has been treated.  He may benefit from colchicine as he had side effects to the indomethacin in the past.

## 2021-06-11 ENCOUNTER — OFFICE VISIT - HEALTHEAST (OUTPATIENT)
Dept: FAMILY MEDICINE | Facility: CLINIC | Age: 66
End: 2021-06-11

## 2021-06-11 DIAGNOSIS — I10 ESSENTIAL HYPERTENSION: ICD-10-CM

## 2021-06-11 DIAGNOSIS — N52.9 ERECTILE DYSFUNCTION, UNSPECIFIED ERECTILE DYSFUNCTION TYPE: ICD-10-CM

## 2021-06-11 DIAGNOSIS — I49.9 IRREGULAR HEART BEAT: ICD-10-CM

## 2021-06-11 DIAGNOSIS — E78.2 MIXED HYPERLIPIDEMIA: ICD-10-CM

## 2021-06-11 LAB
ATRIAL RATE - MUSE: 72 BPM
DIASTOLIC BLOOD PRESSURE - MUSE: NORMAL
INTERPRETATION ECG - MUSE: NORMAL
P AXIS - MUSE: 76 DEGREES
PR INTERVAL - MUSE: 150 MS
QRS DURATION - MUSE: 90 MS
QT - MUSE: 382 MS
QTC - MUSE: 418 MS
R AXIS - MUSE: 19 DEGREES
SYSTOLIC BLOOD PRESSURE - MUSE: NORMAL
T AXIS - MUSE: 46 DEGREES
VENTRICULAR RATE- MUSE: 72 BPM

## 2021-06-11 RX ORDER — AMLODIPINE BESYLATE 5 MG/1
5 TABLET ORAL DAILY
Qty: 30 TABLET | Refills: 11 | Status: SHIPPED | OUTPATIENT
Start: 2021-06-11 | End: 2021-09-07

## 2021-06-11 RX ORDER — SILDENAFIL 50 MG/1
50 TABLET, FILM COATED ORAL PRN
Qty: 30 TABLET | Refills: 1 | Status: SHIPPED | OUTPATIENT
Start: 2021-06-11 | End: 2024-03-14

## 2021-06-11 RX ORDER — ROSUVASTATIN CALCIUM 40 MG/1
40 TABLET, COATED ORAL AT BEDTIME
Qty: 90 TABLET | Refills: 3 | Status: SHIPPED | OUTPATIENT
Start: 2021-06-11 | End: 2022-06-21

## 2021-06-11 NOTE — PROGRESS NOTES
VASCULAR SURGERY OUTPATIENT CONSULT OR VISIT   VASCULAR SURGEON: Adry Mandel MD    LOCATION:  [facility]    Dung Negrete   Medical Record #:  [id number]  YOB: 1955  Age:  65 y.o.     Date of Service: 9/9/2020    PRIMARY CARE PROVIDER: Elmo Crump MD      Reason for consultation: Follow-up of peripheral vascular disease    IMPRESSION: Overall doing very well from a peripheral vascular disease standpoint with no recurrence of stenosis in his right leg and normal ABIs with patent stent.  Unfortunately his walking and getting her has been somewhat limited by a pinched nerve in his back for which he is undergoing work-up and possibly getting a steroid injection soon.  Continues to be on Plavix and aspirin.    RECOMMENDATION: Overall doing well.  We will have him continue the Plavix until he finishes this bottle and then just go to aspirin daily.  If he needs to stop Plavix for procedure for his back this can be done for a week or 2.  I will see him back in 6 months time with a arterial duplex of his right leg and ABIs.    HPI:  Dung Negrete is a 65 y.o. male who was seen today in surveillance for for his peripheral vascular disease.  Treated him for recurrent stenosis of his right leg SFA with drug-eluting stent placement in March of this year.  Successful resolution of his claudication symptoms and this has not recurred.    Has been compliant with statin and dual antiplatelet therapy.  Does not smoke.    His biggest issue has been problems with his back and he has had a work-up for this showing nerve compression from bony spurs.  He is going to get a injection and may have to undergo procedure at some point.    No other new health issues.    PHH:    Past Medical History:   Diagnosis Date     Cellulitis and abscess of leg 8/31/2006     Dyspnea 5/27/2015     Enthesopathy of knee 8/31/2006     GERD (gastroesophageal reflux disease) 1/8/2017     Health examination of defined  subpopulation 12/27/2005     History of total hip arthroplasty, left 12/28/2018     Mixed hyperlipidemia     Created by Conversion      Neck Sprain     Created by Conversion      Polyarthralgia 5/17/2017     Primary osteoarthritis involving multiple joints 5/17/2017        Past Surgical History:   Procedure Laterality Date     ANGIOPLASTY Left 2007     IR EXTREMITY ANGIOGRAM RIGHT  8/23/2019     IR EXTREMITY ANGIOGRAM RIGHT  5/22/2020     TOTAL HIP ARTHROPLASTY Left 12/28/2019     TOTAL HIP ARTHROPLASTY Right        ALLERGIES:  Patient has no known allergies.    MEDS:    Current Outpatient Medications:      allopurinoL (ZYLOPRIM) 100 MG tablet, Take 1 tablet (100 mg total) by mouth daily. Start while taking prednisone 5 mg, overlap with prednisone for 30 days, Disp: 90 tablet, Rfl: 3     aspirin 81 MG EC tablet, Take 81 mg by mouth daily., Disp: , Rfl:      celecoxib (CELEBREX) 100 MG capsule, TAKE 1 CAPSULE BY MOUTH EVERY DAY, Disp: 90 capsule, Rfl: 3     cholecalciferol, vitamin D3, (VITAMIN D3) 400 unit cap, Take 1 capsule by mouth daily., Disp: , Rfl:      clopidogreL (PLAVIX) 75 mg tablet, Take 1 tablet (75 mg total) by mouth daily., Disp: 90 tablet, Rfl: 0     lansoprazole (PREVACID) 30 MG capsule, TAKE 1 CAPSULE BY MOUTH EVERY DAY, Disp: 90 capsule, Rfl: 2     lisinopriL (PRINIVIL,ZESTRIL) 10 MG tablet, Take 1 tablet (10 mg total) by mouth daily., Disp: 90 tablet, Rfl: 3     loratadine 5 mg TbDL, Take 5 mg by mouth daily., Disp: , Rfl:      MULTIVITAMIN ORAL, Take 1 tablet by mouth daily., Disp: , Rfl:      predniSONE (DELTASONE) 5 MG tablet, Take 20 mg by mouth daily for 4 days, THEN 10 mg daily for 4 days, THEN 5 mg daily., Disp: 54 tablet, Rfl: 0     simvastatin (ZOCOR) 40 MG tablet, TAKE 1 TABLET BY MOUTH EVERY DAY, Disp: 90 tablet, Rfl: 0     baclofen (LIORESAL) 10 MG tablet, TAKE 1 TABLET BY MOUTH THREE TIMES A DAY AS NEEDED FOR MUSCLE SPASMS, Disp: , Rfl:      gabapentin (NEURONTIN) 100 MG capsule,  "TAKE 1 CAPSULE IN THE MORNING AND 2 CAPSULES IN THE EVENING., Disp: , Rfl:     SOCIAL HABITS:    Social History     Tobacco Use   Smoking Status Former Smoker     Packs/day: 1.00     Years: 39.00     Pack years: 39.00     Types: Cigarettes     Last attempt to quit: 2014     Years since quittin.2   Smokeless Tobacco Former User     Quit date: 2005       Social History     Substance and Sexual Activity   Alcohol Use Yes    Comment: occasional on weekends       Social History     Substance and Sexual Activity   Drug Use Yes     Types: Marijuana       FAMILY HISTORY:    Family History   Problem Relation Age of Onset     No Medical Problems Mother      Heart disease Father      Hypertension Father      Diabetes Sister      Heart disease Brother      Diabetes Brother      Heart attack Brother      Diabetes Sister        REVIEW OF SYSTEMS:    A 12 point ROS was reviewed and except for what is listed in the HPI above, all others are negative    PE:  /88 (Patient Site: Left Arm, Patient Position: Sitting, Cuff Size: Adult Regular)   Pulse 60   Temp 98.5  F (36.9  C) (Tympanic)   Resp 16   Ht 5' 11\" (1.803 m)   Wt 208 lb (94.3 kg)   BMI 29.01 kg/m    Wt Readings from Last 1 Encounters:   20 208 lb (94.3 kg)     Body mass index is 29.01 kg/m .    EXAM:  GENERAL: This is a well-developed 65 y.o. male who appears his stated age  EYES: Grossly normal.  MOUTH: Buccal mucosa normal   MUSCULOSKELETAL: Grossly normal and both lower extremities are intact.  HEME/LYMPH: No lymphedema  NEUROLOGIC: Focally intact, Alert and oriented x 3.   PSYCH: appropriate affect  INTEGUMENT: No open lesions or ulcers        DIAGNOSTIC STUDIES:     Images:  Us Mack Doppler No Exercise, 1-2 Levels, Bilateral    Result Date: 2020  BILATERAL RESTING ANKLE-BRACHIAL INDICES (MACK'S) Indication: Surveillance of right leg angio and mid SFA stent placement Previous: 6/10/2020 History: Previous Smoker, Hypertension, " Hyperlipidemia, PAD and Angioplasty  Resting MACK's          Right: mmHg Index     Brachial: 163  Ankle-(PT): 172 1.06 Ankle-(DP): 132 0.81           Digit: 84 0.52               Left: mmHg Index     Brachial: 152  Ankle-(PT): 148 0.91 Ankle-(DP): 126 0.77           Digit: 103 0.63 Resting ankle-brachial index of 1.06 on the right. Toe Pressures of 84 mmHg and TBI of 0.52  Resting ankle-brachial index of 0.91 on the left. Toe Pressures of 103 mmHg and TBI of 0.63  WAVEFORMS: The right dorsalis pedis and posterior tibial arteries show monophasic waveforms. The left dorsalis pedis and posterior tibial arteries show monophasic waveforms. Comments:   Impression:  Right MACK is  Normal with an MACK of 1.06 and Toe Pressures of 84 mmHg which is normal. Left MACK is Normal with an MACK of 0.91 and Toe Pressures of 103 mmHg which is normal.     Us Arterial Leg Right    Result Date: 9/9/2020  Arterial Duplex Ultrasound Lower Extremity Artery Evaluation Indication: Surveillance of right leg angio and mid SFA stent placement Previous: 6/10/2020 History: Previous Smoker, Hypertension, Hyperlipidemia, PAD and Angioplasty Technique: Duplex imaging is performed utilizing gray-scale, two-dimensional images, and color-flow imaging. Doppler waveform analysis and spectral Doppler imaging is also performed. LOWER EXTREMITY ARTERIAL DUPLEX EXAM WITH WAVEFORMS Right Leg:(cm/s) Location: Velocities Waveforms EIA:   169  T CFA:   152  T PFA:   231  T SFA Proximal:   151  B SFA Mid: STENT SEE BELOW   SFA Distal:   168  T Popliteal Artery:   62  T PTA:   86   B DAVIS:   57  B DPA:   52  B Waveforms: T=Triphasic, M=Monophasic, B=Biphasic Left Leg:(cm/s) Location: Velocities Waveforms PTA:   72   B DAVIS:   48  B DPA:   41  B Waveforms: T=Triphasic, M=Monophasic, B=Biphasic Stent Velocities: Stent 1: Right Leg Stent location: SFA MID      Velocity    Waveform Inflow Artery: SFA PROX       Proximal to Stent:   242   T Proximal Stent:   234  T Mid Stent:    209  T Distal Stent: 212    T Distal to Stent:   236  T Outflow Artery: Popliteal      Comments:   Impression: Right Lower Extremity: Widely patent flow through the right leg with normal bi-and triphasic waveforms throughout.  This suggested there is no hemodynamically significant stenosis.  Patent SFA stent with no visible stenosis on B-mode imaging.  Velocities through the stents are somewhat elevated, as high as 242 cm/s suggesting a greater than 50% stenosis.  Lack of plaque seen on B-mode imaging however may suggest that this is related to stent hemodynamics. Left Lower Extremity: Not evaluated Reference: Category Normal 1-19% 20-49% 50-99% Occluded PSV <160 cm/sec without spectral broadening <160 cm/sec with spectral broadening Increased Increased Absent Flow Ratio N/A N/A < 2.0 >2.0 N/A Post-Stenotic Turbulence No No No Yes N/A       I personally reviewed the images and my interpretation is that his ABIs are normal and his right leg arterial duplex shows a widely patent stent without stenosis.  Velocities through the stent are somewhat elevated but there is no peak and there are no B-mode narrowings visualized..    LABS:      Sodium   Date Value Ref Range Status   08/25/2020 143 136 - 145 mmol/L Final   06/18/2019 142 136 - 145 mmol/L Final   12/05/2018 139 136 - 145 mmol/L Final     Potassium   Date Value Ref Range Status   08/25/2020 4.8 3.5 - 5.0 mmol/L Final   05/22/2020 4.3 3.5 - 5.0 mmol/L Final   08/23/2019 4.4 3.5 - 5.0 mmol/L Final     Chloride   Date Value Ref Range Status   08/25/2020 108 (H) 98 - 107 mmol/L Final   06/18/2019 107 98 - 107 mmol/L Final   12/05/2018 103 98 - 107 mmol/L Final     BUN   Date Value Ref Range Status   08/25/2020 18 8 - 22 mg/dL Final   06/18/2019 16 8 - 22 mg/dL Final   12/05/2018 20 8 - 22 mg/dL Final     Creatinine   Date Value Ref Range Status   08/25/2020 0.96 0.70 - 1.30 mg/dL Final   05/22/2020 0.87 0.70 - 1.30 mg/dL Final   08/23/2019 0.97 0.70 - 1.30 mg/dL  Final     Hemoglobin   Date Value Ref Range Status   05/22/2020 14.4 14.0 - 18.0 g/dL Final   08/23/2019 15.6 14.0 - 18.0 g/dL Final   08/13/2019 15.4 14.0 - 18.0 g/dL Final     Platelets   Date Value Ref Range Status   05/22/2020 154 140 - 440 thou/uL Final   08/23/2019 161 140 - 440 thou/uL Final   08/13/2019 150 140 - 440 thou/uL Final     INR   Date Value Ref Range Status   05/22/2020 1.01 0.90 - 1.10 Final   08/23/2019 1.00 0.90 - 1.10 Final   03/30/2016 1.00 0.90 - 1.10 Final       Adry Mandel MD  VASCULAR SURGERY

## 2021-06-11 NOTE — TELEPHONE ENCOUNTER
Refill Approved    Rx renewed per Medication Renewal Policy. Medication was last renewed on 8/13/20.    Bridgette Amador, [facility] Connection Triage/Med Refill 9/10/2020     Requested Prescriptions   Pending Prescriptions Disp Refills     simvastatin (ZOCOR) 40 MG tablet [Pharmacy Med Name: SIMVASTATIN 40 MG TABLET] 90 tablet 3     Sig: TAKE 1 TABLET BY MOUTH EVERY DAY       Statins Refill Protocol (Hmg CoA Reductase Inhibitors) Passed - 9/9/2020 12:10 AM        Passed - PCP or prescribing provider visit in past 12 months      Last office visit with prescriber/PCP: 3/19/2019 Elmo Crump MD OR same dept: Visit date not found OR same specialty: 3/19/2019 Elmo Crump MD  Last physical: 8/24/2020 Last MTM visit: Visit date not found   Next visit within 3 mo: Visit date not found  Next physical within 3 mo: Visit date not found  Prescriber OR PCP: Elmo Crump MD  Last diagnosis associated with med order: 1. Hyperlipidemia  - simvastatin (ZOCOR) 40 MG tablet [Pharmacy Med Name: SIMVASTATIN 40 MG TABLET]; TAKE 1 TABLET BY MOUTH EVERY DAY  Dispense: 90 tablet; Refill: 3    If protocol passes may refill for 12 months if within 3 months of last provider visit (or a total of 15 months).

## 2021-06-12 NOTE — PROGRESS NOTES
"ASSESSMENT AND PLAN:  Dung Negrete 62 y.o. male is seen here on 08/09/17 for follow-up of gout, acute episode recently, based on clinical criteria.  He may have a tophus on his right big toe interphalangeal joint.  This flared up during his recent visit to [facility].  He has osteoarthritis.  No definite erosions.  We reviewed the information so far.  After some reflection he continues to feel more inclined to staying with a as needed approach.  We will meet here in 6 months.  I have given him a course of prednisone in case he flares up while he is out of town or on vacation.  Diagnoses and all orders for this visit:    Gout  -     predniSONE (DELTASONE) 10 mg tablet; Take 1.5 tablets (15 mg total) by mouth daily for 15 days.  Dispense: 23 tablet; Refill: 0    Primary osteoarthritis involving multiple joints    HISTORY OF PRESENTING ILLNESS:  Dung Negrete, 62 y.o., male is here for follow-up of gout, based on clinical criteria.  He may now have tophus in the right toe.  Recently when he was in [facility] he had a flareup which was somewhat unusual in this location.  It was held promptly by taking prednisone.  Now he is virtually pain-free although he did notice that \"a whitish area\" he had this there in the right big toe region.  He has been able to continue to do all his day-to-day activities without difficulty is no history of kidney stones.  On his previous visit we had discussed he had an episode where he was hurting in his knees first on the right side then involvement of the feet and at the opposite knee as well.  And this whole episode has not resolved.  Going back he recalls he may have been in his 20s when first started hurting.  Most of his episodes would happen in his feet typically in the ball of the big toe area.  Mostly in the left side but sometimes the right foot 2.  The typical episode would come on suddenly.  There would be warmth redness and swelling of the joint and he would be " "hardly able to touch that.  These episodes would last for a few days to a week or so.  Typically the episode would be shortened if he were to take medication.  At one time he recalls going to an orthopedic doctor we gave him what sounds like indomethacin and within a matter of a day or 2 his symptoms subsided.  In between these episodes he had remained virtually pain-free until recently when it sounds like osteoarthritis is had set in.  He did have a right hip replacement.  Part of his job had been very demanding and he feels that has added to the wear and tear.  Right now is pain-free virtually as he sits here today and he has none of those acute pains although he gets aches and pains otherwise.  He has not had kidney stones his dad had gout.  He has noted history of psoriasis as a child that this affected his face, elbow areas and the knees.  He has not had recurrence of that.  At the peak of his pain it can be severe.  He has noted alcohol 6-8 times per week.  In the past he used to smoke quit 3 4 years ago he had hip replacement on the right side in 2016.  He remembers when they were trying to do the spinal it was hard for them to find a place to put in as they were \"a lot of arthritis \"..   Further historical information and ADL limitations as noted in the multidimensional health assessment questionnaire attached in the EMR.    He is a retired .  He reports that recently      ALLERGIES:Review of patient's allergies indicates no known allergies.    PAST MEDICAL/ACTIVE PROBLEMS/MEDICATION/ FAMILY HISTORY/SOCIAL DATA:  The patient has a family history of  No past medical history on file.  History   Smoking Status     Former Smoker     Quit date: 6/1/2014   Smokeless Tobacco     Former User     Quit date: 2/4/2005     Patient Active Problem List   Diagnosis     Hyperlipidemia     Gout     Neck Sprain     Dyspnea     GERD (gastroesophageal reflux disease)     Polyarthralgia     Primary osteoarthritis " "involving multiple joints     Current Outpatient Prescriptions   Medication Sig Dispense Refill     aspirin 81 MG EC tablet Take 81 mg by mouth daily.       cholecalciferol, vitamin D3, (VITAMIN D3) 400 unit cap Take 1 capsule by mouth daily.       lansoprazole (PREVACID) 30 MG capsule TAKE 1 CAPSULE (30 MG TOTAL) BY MOUTH DAILY. 90 capsule 1     loratadine 5 mg TbDL Take 5 mg by mouth daily.       MULTIVITAMIN ORAL Take 1 tablet by mouth daily.       simvastatin (ZOCOR) 40 MG tablet TAKE ONE TABLET BY MOUTH ONE TIME DAILY 90 tablet 3     No current facility-administered medications for this visit.        DETAILED EXAMINATION  08/09/17  :  Vitals:    08/09/17 1544   BP: 132/72   Patient Site: Right Arm   Patient Position: Sitting   Cuff Size: Adult Regular   Pulse: 76   Weight: 206 lb (93.4 kg)   Height: 5' 11\" (1.803 m)     Alert oriented. Head including the face is examined for malar rash, heliotropes, scarring, lupus pernio. Eyes examined for redness such as in episcleritis/scleritis, periorbital lesions.   Neck examined  for range of motion Both upper and lower extremities (all four) examined for swollen, warm &/or  tender joints, range of motion, rash, muscle weakness, edema. The salient normal / abnormal findings are appended.   . He now very likely has a tophus on his right big toe IP joint area on the medial aspect.  He has Heberden nodes.  He does not have tenderness or acute swelling of any of his palpable appendicular joint.   LAB / IMAGING DATA:  ALT   Date Value Ref Range Status   05/17/2017 32 0 - 45 U/L Final   02/01/2017 47 (H) 0 - 45 U/L Final   05/27/2015 40 0 - 45 U/L Final     Albumin   Date Value Ref Range Status   05/17/2017 4.1 3.5 - 5.0 g/dL Final   02/01/2017 4.2 3.5 - 5.0 g/dL Final   05/27/2015 4.1 3.5 - 5.0 g/dL Final     Creatinine   Date Value Ref Range Status   05/17/2017 0.95 0.70 - 1.30 mg/dL Final   02/01/2017 0.85 0.70 - 1.30 mg/dL Final   03/30/2016 0.81 0.70 - 1.30 mg/dL Final "       WBC   Date Value Ref Range Status   05/17/2017 7.5 4.0 - 11.0 thou/uL Final   02/01/2017 8.1 4.0 - 11.0 thou/uL Final   05/27/2015 6.9 4.0 - 11.0 thou/uL Final     Hemoglobin   Date Value Ref Range Status   05/17/2017 13.9 (L) 14.0 - 18.0 g/dL Final   02/01/2017 15.6 14.0 - 18.0 g/dL Final   03/30/2016 15.6 14.0 - 18.0 g/dL Final     Platelets   Date Value Ref Range Status   05/17/2017 158 140 - 440 thou/uL Final   02/01/2017 169 140 - 440 thou/uL Final   03/30/2016 155 140 - 440 thou/uL Final       Lab Results   Component Value Date    RF <15.0 05/17/2017    SEDRATE 7 05/17/2017

## 2021-06-12 NOTE — TELEPHONE ENCOUNTER
RN cannot approve Refill Request    RN can NOT refill this medication med is not covered by policy/route to provider. Last office visit: 3/19/2019 Elmo Crump MD Last Physical: 8/24/2020 Last MTM visit: Visit date not found Last visit same specialty: 3/19/2019 Elmo Crump MD.  Next visit within 3 mo: Visit date not found  Next physical within 3 mo: Visit date not found      Beata Thomas, [facility] Triage/Med Refill 10/3/2020    Requested Prescriptions   Pending Prescriptions Disp Refills     celecoxib (CELEBREX) 100 MG capsule [Pharmacy Med Name: CELECOXIB 100 MG CAPSULE] 90 capsule 3     Sig: TAKE 1 CAPSULE BY MOUTH EVERY DAY       There is no refill protocol information for this order

## 2021-06-13 NOTE — PROGRESS NOTES
"OV; CTA COMP 12/10-CTA-Mild atheromatous disease involving the right carotid system without significant stenosis. ocular ischemic syndrome L eye; known occlusion in the left side.    October 2020 pt states started to notice \"a blotch in the middle of my eye, it was opaque and the center of my eye no matter which what I would go\"     Pt states \"blotch has gotten better\"  "

## 2021-06-13 NOTE — TELEPHONE ENCOUNTER
Received a refill request from [facility] for clopidogrel. Called pt, according to Tequila's last note pt was to finish last bottle of clopidogrel and then continue aspirin daily. Pt states he finished the clopidogrel and is no longer taking. Pt is taking aspirin daily. No further questions

## 2021-06-13 NOTE — TELEPHONE ENCOUNTER
Refill Approved    Rx renewed per Medication Renewal Policy. Medication was last renewed on 2/8/20.    Bridgette Amador, [facility] Connection Triage/Med Refill 11/16/2020     Requested Prescriptions   Pending Prescriptions Disp Refills     lansoprazole (PREVACID) 30 MG capsule [Pharmacy Med Name: LANSOPRAZOLE DR 30 MG CAPSULE] 90 capsule 2     Sig: TAKE 1 CAPSULE BY MOUTH EVERY DAY       GI Medications Refill Protocol Passed - 11/14/2020 12:29 AM        Passed - PCP or prescribing provider visit in last 12 or next 3 months.     Last office visit with prescriber/PCP: Visit date not found OR same dept: Visit date not found OR same specialty: 3/19/2019 Elmo Crump MD  Last physical: Visit date not found Last MTM visit: Visit date not found   Next visit within 3 mo: Visit date not found  Next physical within 3 mo: Visit date not found  Prescriber OR PCP: Joslyn Calzada MD  Last diagnosis associated with med order: 1. GERD (gastroesophageal reflux disease)  - lansoprazole (PREVACID) 30 MG capsule [Pharmacy Med Name: LANSOPRAZOLE DR 30 MG CAPSULE]; TAKE 1 CAPSULE BY MOUTH EVERY DAY  Dispense: 90 capsule; Refill: 2    If protocol passes may refill for 12 months if within 3 months of last provider visit (or a total of 15 months).

## 2021-06-13 NOTE — PROGRESS NOTES
VASCULAR SURGERY OUTPATIENT CONSULT OR VISIT   VASCULAR SURGEON: Adry Mandel MD    LOCATION:  [facility]    Dung Negrete   Medical Record #:  [id number]  YOB: 1955  Age:  65 y.o.     Date of Service: 12/16/2020    PRIMARY CARE PROVIDER: Elmo Crump MD      Reason for consultation: Evaluation for left retinal ischemia    IMPRESSION: Patient who we have followed for his PAD with successful treatment of claudication with lower extremity angio dimension.  Have also been following him for his carotid disease with a known chronically occluded left internal carotid artery.  New episode of transient vision loss that appears to be consistent with possible retinal embolization.  While he is internal carotid artery is chronically occluded, it is certainly possible that debris is picked up from his bulb and travels up to his eye through the external carotid artery through ophthalmic collateral networks.  Discussed this with the retinal expert who will be seeing him tomorrow, Dr. Cabrera.     RECOMMENDATION: Will review the results of his evaluation by Dr. Cabrera tomorrow and if Dr. Cabrera feels this is embolic and that he is at additional risk for future events we'll look to doing carotid surgery to exclude the carotid bulb plaque from future embolic risk through the external carotid artery.  In the meantime I will start him on Plavix in addition to his aspirin therapy.    HPI:  Dung Negrete is a 65 y.o. male who was seen today in consultation for new vision loss to the left eye.  The patient is well-known to me for his chronic PAD and known left internal carotid artery occlusion.    In October of this year he had a sudden onset loss of central vision to the left eye with a shade or smoky visions centrally.  This was persistent but gradually resolved over 3 weeks to where his vision is now normal.  The year prior the patient had had cataract surgery with complete correction of his vision so  he doesn't otherwise have to wear glasses.  No issues from this.  He has never had any of these symptoms before.  Initially told by various optometrist that this was just a need for glasses but eventually saw a specialist where his cataracts were done who recognized on examination that this represented retinal ischemia.  Now about to be seen by a retinal expert tomorrow, Dr. Cabrera.    The patient has not had any other symptoms consistent with carotid embolic disease: No right arm numbness or weakness or right leg numbness or weakness.  No speech issues.    Regarding his right lower extremity, he has not have any recurrence of his claudication symptoms.  The patient continues to be compliant with statin and aspirin therapy.  We had stopped his Plavix after 3 months of therapy post angiointervention.  Patient does not smoke and has not since 2005.  Going through a lot of major family and personal issues with moving in with COVID-19 as well as with health problems with family members.        PHH:    Past Medical History:   Diagnosis Date     Cellulitis and abscess of leg 8/31/2006     Dyspnea 5/27/2015     Enthesopathy of knee 8/31/2006     GERD (gastroesophageal reflux disease) 1/8/2017     Health examination of defined subpopulation 12/27/2005     History of total hip arthroplasty, left 12/28/2018     Mixed hyperlipidemia     Created by Conversion      Neck Sprain     Created by Conversion      Polyarthralgia 5/17/2017     Primary osteoarthritis involving multiple joints 5/17/2017        Past Surgical History:   Procedure Laterality Date     ANGIOPLASTY Left 2007     IR EXTREMITY ANGIOGRAM RIGHT  8/23/2019     IR EXTREMITY ANGIOGRAM RIGHT  5/22/2020     TOTAL HIP ARTHROPLASTY Left 12/28/2019     TOTAL HIP ARTHROPLASTY Right        ALLERGIES:  Patient has no known allergies.    MEDS:    Current Outpatient Medications:      allopurinoL (ZYLOPRIM) 100 MG tablet, Take 1 tablet (100 mg total) by mouth daily. Start while  taking prednisone 5 mg, overlap with prednisone for 30 days, Disp: 90 tablet, Rfl: 3     aspirin 81 MG EC tablet, Take 81 mg by mouth daily., Disp: , Rfl:      celecoxib (CELEBREX) 100 MG capsule, TAKE 1 CAPSULE BY MOUTH EVERY DAY, Disp: 90 capsule, Rfl: 3     cholecalciferol, vitamin D3, (VITAMIN D3) 400 unit cap, Take 1 capsule by mouth daily., Disp: , Rfl:      lansoprazole (PREVACID) 30 MG capsule, TAKE 1 CAPSULE BY MOUTH EVERY DAY, Disp: 90 capsule, Rfl: 2     lisinopriL (PRINIVIL,ZESTRIL) 10 MG tablet, Take 1 tablet (10 mg total) by mouth daily., Disp: 90 tablet, Rfl: 3     MULTIVITAMIN ORAL, Take 1 tablet by mouth daily., Disp: , Rfl:      simvastatin (ZOCOR) 40 MG tablet, TAKE 1 TABLET BY MOUTH EVERY DAY, Disp: 90 tablet, Rfl: 3     baclofen (LIORESAL) 10 MG tablet, TAKE 1 TABLET BY MOUTH THREE TIMES A DAY AS NEEDED FOR MUSCLE SPASMS, Disp: , Rfl:      clopidogreL (PLAVIX) 75 mg tablet, Take 1 tablet (75 mg total) by mouth daily., Disp: 90 tablet, Rfl: 0     loratadine 5 mg TbDL, Take 5 mg by mouth daily., Disp: , Rfl:     SOCIAL HABITS:    Social History     Tobacco Use   Smoking Status Former Smoker     Packs/day: 1.00     Years: 39.00     Pack years: 39.00     Types: Cigarettes     Quit date: 2014     Years since quittin.5   Smokeless Tobacco Former User     Quit date: 2005       Social History     Substance and Sexual Activity   Alcohol Use Yes    Comment: occasional on weekends       Social History     Substance and Sexual Activity   Drug Use Yes     Types: Marijuana       FAMILY HISTORY:    Family History   Problem Relation Age of Onset     No Medical Problems Mother      Heart disease Father      Hypertension Father      Diabetes Sister      Heart disease Brother      Diabetes Brother      Heart attack Brother      Diabetes Sister        REVIEW OF SYSTEMS:    A 12 point ROS was reviewed and except for what is listed in the HPI above, all others are negative    PE:  /82 (Patient Site:  "Right Arm, Patient Position: Sitting, Cuff Size: Adult Large)   Pulse 76   Resp 18   Ht 5' 11\" (1.803 m)   Wt 214 lb (97.1 kg)   BMI 29.85 kg/m    Wt Readings from Last 1 Encounters:   12/16/20 214 lb (97.1 kg)     Body mass index is 29.85 kg/m .    EXAM:  GENERAL: This is a well-developed 65 y.o. male who appears his stated age  EYES: Grossly normal.  MOUTH: Buccal mucosa normal   MUSCULOSKELETAL: Grossly normal and both lower extremities are intact.  HEME/LYMPH: No lymphedema  NEUROLOGIC: Focally intact, Alert and oriented x 3.   PSYCH: appropriate affect  INTEGUMENT: No open lesions or ulcers        DIAGNOSTIC STUDIES:     Images:  Cta Head And Neck    Result Date: 12/11/2020  EXAM: CTA HEAD AND NECK LOCATION: 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 COMPARISON: CTA head neck dated 09/04/2019. CONTRAST: Iohexol (Omni) 100 mL TECHNIQUE: Head and neck CT angiogram with IV contrast. Noncontrast head CT followed by axial helical CT images of the head and neck vessels obtained during the arterial phase of intravenous contrast administration. Axial 2D reconstructed images and multiplanar 3D MIP reconstructed images of the head and neck vessels were performed by the technologist. Dose reduction techniques were used. All stenosis measurements made according to NASCET criteria unless otherwise specified. FINDINGS: NONCONTRAST HEAD CT: INTRACRANIAL CONTENTS: No intracranial hemorrhage, extraaxial collection, or mass effect.  Chronic left caudate lacunar type infarction. Chronic left centrum semiovale lacunar type infarction. No acute large territory infarction. Mild presumed chronic small vessel ischemic changes. Minimal generalized volume loss. No hydrocephalus. VISUALIZED ORBITS/SINUSES/MASTOIDS: Prior bilateral cataract surgery. Visualized portions of the orbits are otherwise unremarkable. Small air-fluid level involving the sphenoid " sinus. No middle ear or mastoid effusion. BONES/SOFT TISSUES: No acute abnormality. HEAD CTA: ANTERIOR CIRCULATION: Calcified plaque is identified involving the right cavernous and supraclinoid ICA causing mild to moderate stenosis. There is occlusion of the left petrous and proximal cavernous ICA. There is reconstitution of flow involving the distal left cavernous ICA with severe narrowing of the left proximal supraclinoid ICA, stable compared to prior. The distal supraclinoid ICA is patent. Hypoplastic left A1 anterior cerebral artery. The bilateral anterior cerebral arteries are patent. The  left middle cerebral artery is patent, but asymmetrically smaller than the right, likely due to chronic left ICA occlusion. The left MCA vasculature appears patent. There is a blush of contrast opacified vessels above the left M1 segment of the middle cerebral artery, compatible with moyamoya phenomenon. Stable moderate to severe stenosis of the pericallosal arteries. No aneurysm or high flow vascular malformation. Standard [place] of [place] anatomy. POSTERIOR CIRCULATION: Moderate short segment stenosis of the proximal left intradural vertebral artery due to calcified and noncalcified plaque. The right vertebral artery is patent. The basilar artery is patent. The bilateral superior cerebellar arteries are patent. The bilateral posterior cerebral arteries are patent. No aneurysm or high flow vascular malformation. Dominant left and smaller right vertebral artery contribute to a normal basilar artery. DURAL VENOUS SINUSES: Expected enhancement of the major dural venous sinuses. NECK CTA: RIGHT CAROTID: Mild scattered plaque is noted, particularly involving the carotid bifurcation and proximal right cervical ICA. No significant stenosis is present. No dissection is seen. LEFT CAROTID: Mild scattered plaque is noted involving the left common carotid artery. Again seen is chronic occlusion of the left cervical ICA arising at the  bifurcation and extending throughout its course without cervical reconstitution. VERTEBRAL ARTERIES: No focal stenosis or dissection. Dominant left and smaller right vertebral arteries. AORTIC ARCH: Two-vessel aortic arch. No significant stenosis involving the origins of the great vessels. NONVASCULAR STRUCTURES: Centrilobular and paraseptal emphysema is noted involving the lungs. 4 mm right upper lobe nodule.     HEAD CT: 1.  No CT evidence for acute intracranial process. 2.  Brain atrophy and presumed chronic microvascular ischemic changes as above. 3.  Small air-fluid level within the sphenoid sinus. Correlation for acute sphenoid sinusitis is recommended. HEAD CTA: 1.  Chronic occlusion of the left petrous ICA with reconstitution involving the left cavernous ICA. There is persistent severe stenosis of the left supraclinoid ICA, similar to prior. 2.  No acute large vessel occlusion. 3.  Mild to moderate stenosis of the right supraclinoid ICA. 4.  Asymmetrically smaller left M1 middle cerebral artery with moyamoya phenomenon surrounding the left M1 middle cerebral artery. 5.  Moderate short segment stenosis of the left proximal intradural vertebral artery. 6.  Stable moderate to severe stenosis of the pericallosal arteries. NECK CTA: 1.  Chronic left ICA occlusion without cervical reconstitution. 2.  Mild atheromatous disease involving the right carotid system without significant stenosis. 3.  4 mm right upper lobe pulmonary nodule.      I personally reviewed the images and my interpretation is that his carotid duplex and CTA are consistent: The left internal carotid artery is completely occluded from its origin with reconstitution above the skull base through collaterals.  The external carotid artery is patent and certainly does not appear to have important disease.  The plaque at the bulb appears to be hypodense and certainly could be at risk for embolization through the external..    LABS:      Sodium   Date  Value Ref Range Status   08/25/2020 143 136 - 145 mmol/L Final   06/18/2019 142 136 - 145 mmol/L Final   12/05/2018 139 136 - 145 mmol/L Final     Potassium   Date Value Ref Range Status   08/25/2020 4.8 3.5 - 5.0 mmol/L Final   05/22/2020 4.3 3.5 - 5.0 mmol/L Final   08/23/2019 4.4 3.5 - 5.0 mmol/L Final     Chloride   Date Value Ref Range Status   08/25/2020 108 (H) 98 - 107 mmol/L Final   06/18/2019 107 98 - 107 mmol/L Final   12/05/2018 103 98 - 107 mmol/L Final     BUN   Date Value Ref Range Status   08/25/2020 18 8 - 22 mg/dL Final   06/18/2019 16 8 - 22 mg/dL Final   12/05/2018 20 8 - 22 mg/dL Final     Creatinine   Date Value Ref Range Status   08/25/2020 0.96 0.70 - 1.30 mg/dL Final   05/22/2020 0.87 0.70 - 1.30 mg/dL Final   08/23/2019 0.97 0.70 - 1.30 mg/dL Final     Hemoglobin   Date Value Ref Range Status   05/22/2020 14.4 14.0 - 18.0 g/dL Final   08/23/2019 15.6 14.0 - 18.0 g/dL Final   08/13/2019 15.4 14.0 - 18.0 g/dL Final     Platelets   Date Value Ref Range Status   05/22/2020 154 140 - 440 thou/uL Final   08/23/2019 161 140 - 440 thou/uL Final   08/13/2019 150 140 - 440 thou/uL Final     INR   Date Value Ref Range Status   05/22/2020 1.01 0.90 - 1.10 Final   08/23/2019 1.00 0.90 - 1.10 Final   03/30/2016 1.00 0.90 - 1.10 Final         Adry Mandel MD  VASCULAR SURGERY

## 2021-06-14 NOTE — PROGRESS NOTES
"Dung Negrete is a 65 y.o. male who is being evaluated via a billable telephone visit.      What phone number would you like to be contacted at? [phone]  How would you like to obtain your AVS? AVS Preference: Raisa.  Assessment & Plan     Dung was seen today for gout.    Diagnoses and all orders for this visit:    Gout  -     predniSONE (DELTASONE) 20 MG tablet; Take 60 mg by mouth daily for 4 days, THEN 40 mg daily for 4 days, THEN 20 mg daily for 4 days, THEN 10 mg daily for 4 days.  -     allopurinoL (ZYLOPRIM) 300 MG tablet; Take 1 tablet (300 mg total) by mouth daily.    Essential hypertension  -     lisinopriL (PRINIVIL,ZESTRIL) 20 MG tablet; Take 1 tablet (20 mg total) by mouth daily.      For the gout start prednisone 60 mg daily for 4 days tapering down every 4 days over a total course of 16 days this will hopefully alleviate acute attack.  Once acute attack has diminished recommend increasing allopurinol dose gradually first up to 200 mg using his current 100 g tablets then up to 300 mg any new prescription is sent 4300 mg tablets.    For high blood pressure increase lisinopril to 20 mg daily.    [id number]}     BMI:   Estimated body mass index is 29.85 kg/m  as calculated from the following:    Height as of 12/16/20: 5' 11\" (1.803 m).    Weight as of 12/16/20: 214 lb (97.1 kg).       No follow-ups on file.    Elmo Crump MD  [facility]     Dung Negrete is 65 y.o. and presents to clinic today for the following health issues   HPI     His medical history includes peripheral vascular disease, recent diagnosis of ocular ischemic syndrome.  He follows with vascular surgery as well as vitreoretinal surgeon.  We reviewed his recent course regarding his vascular disease.  He seems to be doing well at the current time but is having some ongoing vision difficulties which at this time are somewhat hard for him to describe.  He is on a regiment that includes " cilostazol, clopidogrel and aspirin.  He does not report any bleeding concerns at this time.    He is noted to be more hypertensive on recent visits.  He is currently on lisinopril 10.  We discussed increasing lisinopril up to 20 mg once daily to gain better control.  May need to consider additional medication therapy.  He remains on simvastatin 40 mg.    He has a diagnosis of gout.  This past August 2020 we started him on allopurinol in an effort to try to prevent gout attacks which typically occur in the feet.Patient reports onset of symptoms in the left for the last several days with significant worsening pain redness and swelling.  The symptoms remain confined to the foot.  He reports no swelling in the legs, no redness in the legs.    We discussed acute treatment of his gout using prednisone and a plan for titration of his allopurinol dose in order to try to in the longer term prevent recurrent attacks.      Review of Systems  Complete review of systems is obtained.  Other than the specific considerations noted above complete review of systems is negative.        Objective       Vitals:  No vitals were obtained today due to virtual visit.    Physical Exam          Phone call duration: 12 minutes

## 2021-06-14 NOTE — PATIENT INSTRUCTIONS - HE
We would like to start you on a medication called Pletal or Cilostazol.    We start you on this slowly and go up over the course of 4 weeks.    The first 2 weeks we would like you to take 50 mg once a day for 2 weeks.  Then increase to 50mg twice a day for 2 weeks.  Finally increase to 100mg twice a day thereafter.    Please notify us if you have any problems with nausea, diarrhea or increased blood sugar levels. During this dose titration we would like you to check your blood sugars twice a day if you are diabetic.   Cilostazol Oral tablet  What is this medicine?  CILOSTAZOL (yesy [place] sta zol) is used to treat the symptoms of intermittent claudication. This condition causes pain in the legs during walking, and goes away with rest. By improving blood flow, this medicine helps people with this condition walk longer distances without pain.  This medicine may be used for other purposes; ask your health care provider or pharmacist if you have questions.  What should I tell my health care provider before I take this medicine?  They need to know if you have any of the following conditions:    bleeding disorder or hemophilia    history of heart failure, heart attack, or other heart disease    an unusual or allergic reaction to cilostazol, other medicines, foods, dyes, or preservatives    pregnant or trying to get pregnant    breast-feeding  How should I use this medicine?  Take this medicine by mouth with a full glass of water. Follow the directions on the prescription label. Take this medicine on an empty stomach, at least 30 minutes before or 2 hours after food. Do not take with food. Take your doses at regular intervals. Do not take your medicine more often than directed.  Talk to your pediatrician regarding the use of this medicine in children. Special care may be needed.  Overdosage: If you think you have taken too much of this medicine contact a poison control center or emergency room at once.  NOTE: This medicine is  only for you. Do not share this medicine with others.  What if I miss a dose?  If you miss a dose, take it as soon as you can. If it is almost time for your next dose, take only that dose. Do not take double or extra doses.  What may interact with this medicine?  Do not take this medicine with any of the following medications:    grapefruit juice  This medicine may also interact with the following medications:    agents that prevent or treat blood clots like enoxaparin or warfarin    aspirin    diltiazem    erythromycin or clarithromycin    omeprazole    some medications for treating depression like fluoxetine, fluvoxamine, nefazodone    some medications for treating fungal infections like ketoconazole, fluconazole, itraconazole  This list may not describe all possible interactions. Give your health care provider a list of all the medicines, herbs, non-prescription drugs, or dietary supplements you use. Also tell them if you smoke, drink alcohol, or use illegal drugs. Some items may interact with your medicine.  What should I watch for while using this medicine?  Visit your doctor or health care professional for regular checks on your progress. It may take 2 to 4 weeks for your condition to start to get better once you begin taking this medicine. In some people, it can take as long as 3 months for the condition to get better.  You may get drowsy or dizzy. Do not drive, use machinery, or do anything that needs mental alertness until you know how this drug affects you. Do not stand or sit up quickly, especially if you are an older patient. This reduces the risk of dizzy or fainting spells. Alcohol can make you more drowsy and dizzy. Avoid alcoholic drinks.  Smoking may have effects on the circulation that may limit the benefits you receive from this medicine. You may wish to discuss how to stop smoking with your doctor or health care professional.  If you are going to have surgery, tell your doctor or health care  professional that you are taking this medicine.  What side effects may I notice from receiving this medicine?  Side effects that you should report to your doctor or health care professional as soon as possible:    allergic reactions like skin rash, itching or hives, swelling of the face, lips, or tongue    chest pain    fast, slow, or irregular heartbeat    signs and symptoms of bleeding such as bloody or black, tarry stools; red or dark-brown urine; spitting up blood or brown material that looks like coffee grounds; red spots on the skin; unusual bruising or bleeding from the eye, gums, or nose    swelling in the legs or ankles  Side effects that usually do not require medical attention (report to your doctor or health care professional if they continue or are bothersome):    diarrhea    headache    nausea, or upset stomach  This list may not describe all possible side effects. Call your doctor for medical advice about side effects. You may report side effects to FDA at 3-779-FDA-5548.  Where should I keep my medicine?  Keep out of the reach of children.  Store at room temperature between 15 and 30 degrees C (59 and 86 degrees F). Throw away any unused medicine after the expiration date.  NOTE:This sheet is a summary. It may not cover all possible information. If you have questions about this medicine, talk to your doctor, pharmacist, or health care provider. Copyright  2015 [employer] Standard

## 2021-06-14 NOTE — PROGRESS NOTES
VASCULAR SURGERY OUTPATIENT VIRTUAL CONSULT OR VISIT   VASCULAR SURGEON: Adry Mandel MD    LOCATION:  Virtual visit done using telephone    Dung Negrete   Medical Record #:  [id number]  YOB: 1955  Age:  65 y.o.     Date of Service: 12/23/2020    PRIMARY CARE PROVIDER: Elmo Crump MD      Reason for consultation: Discussion of carotid disease    IMPRESSION: Patient who we recently evaluated and who revealed that he had had transient vision loss in his left eye.  Known left internal carotid artery occlusion but patent external carotid artery.  After careful retinal evaluation by his retina expert the eye injury appears to be ischemic and not embolic in etiology and so there is no role for carotid bulb revision to remove possible embolic source.    RECOMMENDATION: Overall doing very well from his PAD as well as recovery of his vision.  Ongoing risk of ischemic retinal injuries given his internal carotid artery occlusion which cannot be revascularized.  No role for exclusion of the external carotid artery from carotid bulb debris as this does not appear to have been embolic.  To try to optimize blood flow both to his limb as well as to his eye and reduce additional stroke risk as well we will add cilostazol to his antiplatelet regimen.  While he is on aspirin Plavix he is also on omeprazole which may be affecting the activity of his Plavix as the patient points out.  We discussed the risks of side effects from cilostazol and its overall known improvement in patency of lower extremity intervention which she has had as well as his history of improvement in reduction of strokes.  Overall it is a beneficial drug which may have a small impact even regarding his retinal ischemia.  Patient is happy to give it a try.    Plan to see him back in 6 months time with repeat ABIs and arterial duplex.  No repeat imaging of the carotid arteries.    HPI:  Dung Negrete is a 65 y.o. male who was evaluated  today for his left eye symptoms.  Saw him recently and he had had episode of vision loss recognized to be retinal etiology.  Was going to see a retina expert but we reviewed his imaging and recognize that there are situations where the external carotid artery, through collaterals, can be a source of embolization to the retina in the context of an occluded internal carotid artery.  Since this discussion with him he saw the retinal expert in the retina expert, Dr. Maximus Robb, reached out to me and we discussed his situation.  He feels strongly his that the retinal exam supports retinal ischemia but not embolic injury.  Patient is overall doing well now with no new events.  Quite concerned about risk of additional events and also has looked up and recognize that there are situations where Plavix function may be affected by his needed omeprazole.    PHH:    Past Medical History:   Diagnosis Date     Cellulitis and abscess of leg 8/31/2006     Dyspnea 5/27/2015     Enthesopathy of knee 8/31/2006     GERD (gastroesophageal reflux disease) 1/8/2017     Health examination of defined subpopulation 12/27/2005     History of total hip arthroplasty, left 12/28/2018     Mixed hyperlipidemia     Created by Conversion      Neck Sprain     Created by Conversion      Polyarthralgia 5/17/2017     Primary osteoarthritis involving multiple joints 5/17/2017        Past Surgical History:   Procedure Laterality Date     ANGIOPLASTY Left 2007     IR EXTREMITY ANGIOGRAM RIGHT  8/23/2019     IR EXTREMITY ANGIOGRAM RIGHT  5/22/2020     TOTAL HIP ARTHROPLASTY Left 12/28/2019     TOTAL HIP ARTHROPLASTY Right        ALLERGIES:  Patient has no known allergies.    MEDS:    Current Outpatient Medications:      allopurinoL (ZYLOPRIM) 100 MG tablet, Take 1 tablet (100 mg total) by mouth daily. Start while taking prednisone 5 mg, overlap with prednisone for 30 days, Disp: 90 tablet, Rfl: 3     aspirin 81 MG EC tablet, Take 81 mg by mouth daily., Disp:  , Rfl:      baclofen (LIORESAL) 10 MG tablet, TAKE 1 TABLET BY MOUTH THREE TIMES A DAY AS NEEDED FOR MUSCLE SPASMS, Disp: , Rfl:      celecoxib (CELEBREX) 100 MG capsule, TAKE 1 CAPSULE BY MOUTH EVERY DAY, Disp: 90 capsule, Rfl: 3     cholecalciferol, vitamin D3, (VITAMIN D3) 400 unit cap, Take 1 capsule by mouth daily., Disp: , Rfl:      clopidogreL (PLAVIX) 75 mg tablet, Take 1 tablet (75 mg total) by mouth daily., Disp: 90 tablet, Rfl: 0     lansoprazole (PREVACID) 30 MG capsule, TAKE 1 CAPSULE BY MOUTH EVERY DAY, Disp: 90 capsule, Rfl: 2     lisinopriL (PRINIVIL,ZESTRIL) 10 MG tablet, Take 1 tablet (10 mg total) by mouth daily., Disp: 90 tablet, Rfl: 3     loratadine 5 mg TbDL, Take 5 mg by mouth daily., Disp: , Rfl:      MULTIVITAMIN ORAL, Take 1 tablet by mouth daily., Disp: , Rfl:      simvastatin (ZOCOR) 40 MG tablet, TAKE 1 TABLET BY MOUTH EVERY DAY, Disp: 90 tablet, Rfl: 3    SOCIAL HABITS:    Social History     Tobacco Use   Smoking Status Former Smoker     Packs/day: 1.00     Years: 39.00     Pack years: 39.00     Types: Cigarettes     Quit date: 2014     Years since quittin.5   Smokeless Tobacco Former User     Quit date: 2005       Social History     Substance and Sexual Activity   Alcohol Use Yes    Comment: occasional on weekends       Social History     Substance and Sexual Activity   Drug Use Yes     Types: Marijuana       FAMILY HISTORY:    Family History   Problem Relation Age of Onset     No Medical Problems Mother      Heart disease Father      Hypertension Father      Diabetes Sister      Heart disease Brother      Diabetes Brother      Heart attack Brother      Diabetes Sister        REVIEW OF SYSTEMS:    A 12 point ROS was reviewed and except for what is listed in the HPI above, all others are negative    PE:  There were no vitals taken for this visit.  Wt Readings from Last 1 Encounters:   20 214 lb (97.1 kg)     There is no height or weight on file to calculate  BMI.    EXAM:  EXAM LIMITED AS THIS IS A VIRTUAL VISIT with telephone    DIAGNOSTIC STUDIES:     Images:  Cta Head And Neck    Result Date: 12/11/2020  EXAM: CTA HEAD AND NECK LOCATION: [facility] DATE/TIME: 12/10/2020 4:16 PM INDICATION: Ocular ischemic syndrome of the left eye. COMPARISON: CTA head neck dated 09/04/2019. CONTRAST: Iohexol (Omni) 100 mL TECHNIQUE: Head and neck CT angiogram with IV contrast. Noncontrast head CT followed by axial helical CT images of the head and neck vessels obtained during the arterial phase of intravenous contrast administration. Axial 2D reconstructed images and multiplanar 3D MIP reconstructed images of the head and neck vessels were performed by the technologist. Dose reduction techniques were used. All stenosis measurements made according to NASCET criteria unless otherwise specified. FINDINGS: NONCONTRAST HEAD CT: INTRACRANIAL CONTENTS: No intracranial hemorrhage, extraaxial collection, or mass effect.  Chronic left caudate lacunar type infarction. Chronic left centrum semiovale lacunar type infarction. No acute large territory infarction. Mild presumed chronic small vessel ischemic changes. Minimal generalized volume loss. No hydrocephalus. VISUALIZED ORBITS/SINUSES/MASTOIDS: Prior bilateral cataract surgery. Visualized portions of the orbits are otherwise unremarkable. Small air-fluid level involving the sphenoid sinus. No middle ear or mastoid effusion. BONES/SOFT TISSUES: No acute abnormality. HEAD CTA: ANTERIOR CIRCULATION: Calcified plaque is identified involving the right cavernous and supraclinoid ICA causing mild to moderate stenosis. There is occlusion of the left petrous and proximal cavernous ICA. There is reconstitution of flow involving the distal left cavernous ICA with severe narrowing of the left proximal supraclinoid ICA, stable compared to prior. The distal supraclinoid ICA is patent. Hypoplastic left A1 anterior cerebral artery.  The bilateral anterior cerebral arteries are patent. The  left middle cerebral artery is patent, but asymmetrically smaller than the right, likely due to chronic left ICA occlusion. The left MCA vasculature appears patent. There is a blush of contrast opacified vessels above the left M1 segment of the middle cerebral artery, compatible with moyamoya phenomenon. Stable moderate to severe stenosis of the pericallosal arteries. No aneurysm or high flow vascular malformation. Standard [place] anatomy. POSTERIOR CIRCULATION: Moderate short segment stenosis of the proximal left intradural vertebral artery due to calcified and noncalcified plaque. The right vertebral artery is patent. The basilar artery is patent. The bilateral superior cerebellar arteries are patent. The bilateral posterior cerebral arteries are patent. No aneurysm or high flow vascular malformation. Dominant left and smaller right vertebral artery contribute to a normal basilar artery. DURAL VENOUS SINUSES: Expected enhancement of the major dural venous sinuses. NECK CTA: RIGHT CAROTID: Mild scattered plaque is noted, particularly involving the carotid bifurcation and proximal right cervical ICA. No significant stenosis is present. No dissection is seen. LEFT CAROTID: Mild scattered plaque is noted involving the left common carotid artery. Again seen is chronic occlusion of the left cervical ICA arising at the bifurcation and extending throughout its course without cervical reconstitution. VERTEBRAL ARTERIES: No focal stenosis or dissection. Dominant left and smaller right vertebral arteries. AORTIC ARCH: Two-vessel aortic arch. No significant stenosis involving the origins of the great vessels. NONVASCULAR STRUCTURES: Centrilobular and paraseptal emphysema is noted involving the lungs. 4 mm right upper lobe nodule.     HEAD CT: 1.  No CT evidence for acute intracranial process. 2.  Brain atrophy and presumed chronic microvascular ischemic  changes as above. 3.  Small air-fluid level within the sphenoid sinus. Correlation for acute sphenoid sinusitis is recommended. HEAD CTA: 1.  Chronic occlusion of the left petrous ICA with reconstitution involving the left cavernous ICA. There is persistent severe stenosis of the left supraclinoid ICA, similar to prior. 2.  No acute large vessel occlusion. 3.  Mild to moderate stenosis of the right supraclinoid ICA. 4.  Asymmetrically smaller left M1 middle cerebral artery with moyamoya phenomenon surrounding the left M1 middle cerebral artery. 5.  Moderate short segment stenosis of the left proximal intradural vertebral artery. 6.  Stable moderate to severe stenosis of the pericallosal arteries. NECK CTA: 1.  Chronic left ICA occlusion without cervical reconstitution. 2.  Mild atheromatous disease involving the right carotid system without significant stenosis. 3.  4 mm right upper lobe pulmonary nodule.          LABS:      Sodium   Date Value Ref Range Status   08/25/2020 143 136 - 145 mmol/L Final   06/18/2019 142 136 - 145 mmol/L Final   12/05/2018 139 136 - 145 mmol/L Final     Potassium   Date Value Ref Range Status   08/25/2020 4.8 3.5 - 5.0 mmol/L Final   05/22/2020 4.3 3.5 - 5.0 mmol/L Final   08/23/2019 4.4 3.5 - 5.0 mmol/L Final     Chloride   Date Value Ref Range Status   08/25/2020 108 (H) 98 - 107 mmol/L Final   06/18/2019 107 98 - 107 mmol/L Final   12/05/2018 103 98 - 107 mmol/L Final     BUN   Date Value Ref Range Status   08/25/2020 18 8 - 22 mg/dL Final   06/18/2019 16 8 - 22 mg/dL Final   12/05/2018 20 8 - 22 mg/dL Final     Creatinine   Date Value Ref Range Status   08/25/2020 0.96 0.70 - 1.30 mg/dL Final   05/22/2020 0.87 0.70 - 1.30 mg/dL Final   08/23/2019 0.97 0.70 - 1.30 mg/dL Final     Hemoglobin   Date Value Ref Range Status   05/22/2020 14.4 14.0 - 18.0 g/dL Final   08/23/2019 15.6 14.0 - 18.0 g/dL Final   08/13/2019 15.4 14.0 - 18.0 g/dL Final     Platelets   Date Value Ref Range Status    05/22/2020 154 140 - 440 thou/uL Final   08/23/2019 161 140 - 440 thou/uL Final   08/13/2019 150 140 - 440 thou/uL Final     INR   Date Value Ref Range Status   05/22/2020 1.01 0.90 - 1.10 Final   08/23/2019 1.00 0.90 - 1.10 Final   03/30/2016 1.00 0.90 - 1.10 Final         This was a telephone based visit with start time of 11:15am and end time of 11:21am    Adry Mandel MD  VASCULAR SURGERY

## 2021-06-15 PROBLEM — K21.9 GERD (GASTROESOPHAGEAL REFLUX DISEASE): Status: ACTIVE | Noted: 2017-01-08

## 2021-06-15 PROBLEM — M15.0 PRIMARY OSTEOARTHRITIS INVOLVING MULTIPLE JOINTS: Status: ACTIVE | Noted: 2017-05-17

## 2021-06-15 PROBLEM — M25.50 POLYARTHRALGIA: Status: ACTIVE | Noted: 2017-05-17

## 2021-06-16 PROBLEM — D12.4 BENIGN NEOPLASM OF DESCENDING COLON: Status: ACTIVE | Noted: 2017-01-24

## 2021-06-16 PROBLEM — K64.9 HEMORRHOIDS: Status: ACTIVE | Noted: 2017-01-19

## 2021-06-16 PROBLEM — Z86.0100 HISTORY OF COLONIC POLYPS: Status: ACTIVE | Noted: 2020-02-26

## 2021-06-16 PROBLEM — K63.5 POLYP OF COLON: Status: ACTIVE | Noted: 2017-01-19

## 2021-06-16 NOTE — PROGRESS NOTES
Assessment:      Healthy male exam.   Borderline hypertension  Obesity  Hyperlipidemia  Vitamin D deficiency     Plan:       Routine lab work will be done today and patient will be called with any lab abnormalities.  Vitamin D will be rechecked due to his vitamin D deficiency.  Continue all present medications.  We discussed his blood pressures at length.  He will start a weight loss type diet.  He will start aerobically exercising routinely.  He will also stay on a sodium restricted diet.  Colonoscopy is up-to-date.     Subjective:      Dung Negrete is a 63 y.o. male who presents for an annual exam. The patient reports that there is not domestic violence in his life.  Overall, he is feeling well.  He will get occasional constipation.  He also has a history of hemorrhoids and will sometimes notice a little blood in the stool but this is infrequent.  He stays quite active.  Denies chest pains.  He does get heartburn.  He takes Prevacid for that and it works well.  He is on simvastatin for cholesterol control and has no side effects to that.    Healthy Habits:   Regular Exercise: No  Sunscreen Use: Yes  Healthy Diet: Yes  Dental Visits Regularly: Yes  Seat Belt: Yes  Sexually active: Yes  Monthly Self Testicular Exams:  No  Hemoccults: No  Flex Sig: No  Colonoscopy: Yes  Lipid Profile: Yes  Glucose Screen: Yes      Immunization History   Administered Date(s) Administered     DT (pediatric) 08/09/1998     Hep A, historic 08/25/2010, 07/03/2012     Td,adult,historic,unspecified 08/09/1998     Tdap 07/03/2012     Immunization status: up to date and documented.    No exam data present     Current Outpatient Prescriptions   Medication Sig Dispense Refill     aspirin 81 MG EC tablet Take 81 mg by mouth daily.       cholecalciferol, vitamin D3, (VITAMIN D3) 400 unit cap Take 1 capsule by mouth daily.       lansoprazole (PREVACID) 30 MG capsule TAKE 1 CAPSULE (30 MG TOTAL) BY MOUTH DAILY. 90 capsule 1     loratadine 5 mg  "TbDL Take 5 mg by mouth daily.       MULTIVITAMIN ORAL Take 1 tablet by mouth daily.       simvastatin (ZOCOR) 40 MG tablet TAKE ONE TABLET BY MOUTH ONE TIME DAILY 90 tablet 3     No current facility-administered medications for this visit.      No past medical history on file.  No past surgical history on file.  Review of patient's allergies indicates no known allergies.  No family history on file.  Social History     Social History     Marital status:      Spouse name: N/A     Number of children: N/A     Years of education: N/A     Occupational History     Not on file.     Social History Main Topics     Smoking status: Former Smoker     Quit date: 6/1/2014     Smokeless tobacco: Former User     Quit date: 2/4/2005     Alcohol use Yes      Comment: occasional on weekends     Drug use: Yes     Special: Marijuana     Sexual activity: Yes     Partners: Female     Other Topics Concern     Not on file     Social History Narrative       Review of Systems  Review of Systems   Constitutional: Negative.  Negative for fatigue and fever.   HENT: Negative.  Negative for congestion.    Eyes: Negative.    Respiratory: Negative.  Negative for cough and shortness of breath.    Cardiovascular: Negative.  Negative for chest pain.   Gastrointestinal: Positive for constipation. Negative for blood in stool.   Endocrine: Negative.    Genitourinary:        Awakens once nightly to void.   Musculoskeletal: Negative.    Skin: Negative.    Allergic/Immunologic: Negative.    Neurological: Negative.    Hematological: Negative.    Psychiatric/Behavioral: Negative.              Objective:     Vitals:    02/14/18 0934   BP: (!) 142/100   Pulse: 72   Resp: 16   Weight: 212 lb (96.2 kg)   Height: 5' 11\" (1.803 m)     Body mass index is 29.57 kg/(m^2).    Physical  Physical Exam   Constitutional: He is oriented to person, place, and time. He appears well-developed and well-nourished. No distress.   HENT:   Right Ear: External ear normal. "   Left Ear: External ear normal.   Mouth/Throat: Oropharynx is clear and moist.   Eyes: Conjunctivae and EOM are normal. Pupils are equal, round, and reactive to light.   Mild cataracts noted bilaterally.  Left side worse than right.   Neck: Normal range of motion. Neck supple. No JVD present. No thyromegaly present.   Cardiovascular: Normal rate, regular rhythm and normal heart sounds.    No murmur heard.  Pulmonary/Chest: Effort normal and breath sounds normal. No respiratory distress.   Abdominal: Soft. Bowel sounds are normal. He exhibits no mass. There is no tenderness.   Genitourinary: Penis normal.   Genitourinary Comments: Prostate was mildly enlarged but symmetrically sized.  No nodularity present.  No tenderness noted.  No inguinal hernias present.   Musculoskeletal: Normal range of motion. He exhibits no edema or tenderness.   Lymphadenopathy:     He has no cervical adenopathy.   Neurological: He is alert and oriented to person, place, and time. He has normal reflexes.   Skin: Skin is warm and dry. No rash noted.   Psychiatric: He has a normal mood and affect.

## 2021-06-17 NOTE — PATIENT INSTRUCTIONS - HE
Patient Instructions by Elmo Crump MD at 6/18/2019  9:40 AM     Author: Elmo Crump MD Service: -- Author Type: Physician    Filed: 6/18/2019 11:06 AM Encounter Date: 6/18/2019 Status: Signed    : Elmo Crump MD (Physician)         Patient Education     Exercise for a Healthier Heart  You may wonder how you can improve the health of your heart. If youre thinking about exercise, youre on the right track. You dont need to become an athlete, but you do need a certain amount of brisk exercise to help strengthen your heart. If you have been diagnosed with a heart condition, your doctor may recommend exercise to help stabilize your condition. To help make exercise a habit, choose safe, fun activities.       Be sure to check with your health care provider before starting an exercise program.    Why exercise?  Exercising regularly offers many healthy rewards. It can help you do all of the following:    Improve your blood cholesterol levels to help prevent further heart trouble    Lower your blood pressure to help prevent a stroke or heart attack    Control diabetes, or reduce your risk of getting this disease    Improve your heart and lung function    Reach and maintain a healthy weight    Make your muscles stronger and more limber so you can stay active    Prevent falls and fractures by slowing the loss of bone mass (osteoporosis)    Manage stress better  Exercise tips  Ease into your routine. Set small goals. Then build on them.  Exercise on most days. Aim for a total of 150 or more minutes of moderate to  vigorous intensity activity each week. Consider 40 minutes, 3 to 4 times a week. For best results, activity should last for 40 minutes on average. It is OK to work up to the 40 minute period over time. Examples of moderate-intensity activity is walking one mile in 15 minutes or 30 to 45 minutes of yard work.  Step up your daily activity level. Along with your exercise program, try being more  active throughout the day. Walk instead of drive. Do more household tasks or yard work.  Choose one or more activities you enjoy. Walking is one of the easiest things you can do. You can also try swimming, riding a bike, or taking an exercise class.  Stop exercising and call your doctor if you:    Have chest pain or feel dizzy or lightheaded    Feel burning, tightness, pressure, or heaviness in your chest, neck, shoulders, back, or arms    Have unusual shortness of breath    Have increased joint or muscle pain    Have palpitations or an irregular heartbeat      3090-0338 [employer]. [address] [place] [id number]. All rights reserved. This information is not intended as a substitute for professional medical care. Always follow your healthcare professional's instructions.         Patient Education   Understanding [employer] MyPlate  The USDA (US Department of Agriculture) has guidelines to help you make healthy food choices. These are called MyPlate. MyPlate shows the food groups that make up healthy meals using the image of a place setting. Before you eat, think about the healthiest choices for what to put onto your plate or into your cup or bowl. To learn more about building a healthy plate, visit www.choosemyplate.gov.       The Food Groups    Fruits: Any fruit or 100% fruit juice counts as part of the Fruit Group. Fruits may be fresh, canned, frozen, or dried, and may be whole, cut-up, or pureed. Make half your plate fruits and vegetables.    Vegetables: Any vegetable or 100% vegetable juice counts as a member of the Vegetable Group. Vegetables may be fresh, frozen, canned, or dried. They can be served raw or cooked and may be whole, cut-up, or mashed. Make half your plate fruits and vegetables.     Grains: All foods made from grains are part of the Grains Group. These include wheat, rice, oats, cornmeal, and barley such as bread, pasta, oatmeal, cereal, tortillas, and grits. Grains should be  no more than a quarter of your plate. At least half of your grains should be whole grains.    Protein: This group includes meat, poultry, seafood, beans and peas, eggs, processed soy products (like tofu), nuts (including nut butters), and seeds. Make protein choices no more than a quarter of your plate. Meat and poultry choices should be lean or low fat.    Dairy: All fluid milk products and foods made from milk that contain calcium, like yogurt and cheese are part of the Dairy Group. (Foods that have little calcium, such as cream, butter, and cream cheese, are not part of the group.) Most dairy choices should be low-fat or fat-free.    Oils: These are fats that are liquid at room temperature. They include canola, corn, olive, soybean, and sunflower oil. Foods that are mainly oil include mayonnaise, certain salad dressings, and soft margarines. You should have only 5 to 7 teaspoons of oils a day. You probably already get this much from the food you eat.  Use [employer] to Help Build Your Meals  The [employer] can help you plan and track your meals and activity. You can look up individual foods to see or compare their nutritional value. You can get guidelines for what and how much you should eat. You can compare your food choices. And you can assess personal physical activities and see ways you can improve. Go to www.Plovgh.gov/supertracker/.    2599-6252 [employer]. [address] [place] [address]. All rights reserved. This information is not intended as a substitute for professional medical care. Always follow your healthcare professional's instructions.           Patient Education   Understanding Advance Care Planning  Advance care planning is the process of deciding ones own future medical care. It helps ensure that if you cant speak for yourself, your wishes can still be carried out. The plan is a series of legal documents that note a persons wishes. The documents vary by  state. Advance care planning may be done when a person has a serious illness that is expected to get worse. It may be done before major surgery. And it can help you and your family be prepared in case of a major illness or injury. Advance care planning helps with making decisions at these times.       A health care proxy is a person who acts as the voice of a patient when the patient cant speak for himself or herself. The name of this role varies by state. It may be called a Durable Medical Power of  or Durable Power of  for Healthcare. It may be called an agent, surrogate, or advocate. Or it may be called a representative or decision maker. It is an official duty that is identified by a legal document. The document also varies by state.    Why Is Advance Care Planning Important?  If a person communicates their healthcare wishes:    They will be given medical care that matches their values and goals.    Their family members will not be forced to make decisions in a crisis with no guidance.  Creating a Plan  Making an advance care plan is often done in 3 steps:    Thinking about ones wishes. To create an advance care plan, you should think about what kind of medical treatment you would want if you lose the ability to communicate. Are there any situations in which you would refuse or stop treatment? Are there therapies you would want or not want? And whom do you want to make decisions for you? There are many places to learn more about how to plan for your care. Ask your doctor or  for resources.    Picking a health care proxy. This means choosing a trusted person to speak for you only when you cant speak for yourself. When you cannot make medical decisions, your proxy makes sure the instructions in your advance care plan are followed. A proxy does not make decisions based on his or her own opinions. They must put aside those opinions and values if needed, and carry out your  wishes.    Filling out the legal documents. There are several kinds of legal documents for advance care planning. Each one tells health care providers your wishes. The documents may vary by state. They must be signed and may need to be witnessed or notarized. You can cancel or change them whenever you wish. Depending on your state, the documents may include a Healthcare Proxy form, Living Will, Durable Medical Power of , Advance Directive, or others.  The Familys Role  The best help a family can give is to support their loved ones wishes. Open and honest communication is vital. Family should express any concerns they have about the patients choices while the patient can still make decisions.    2259-5409 [employer]. [address] [place] [address], [place] [address]. All rights reserved. This information is not intended as a substitute for professional medical care. Always follow your healthcare professional's instructions.         Also, [facility] offers a free, downloadable health care directive that allows you to share your treatment choices and personal preferences if you cannot communicate your wishes. It also allows you to appoint another person (called a health care agent) to make health care decisions if you are unable to do so. You can download an advance directive by going here: [email or web address]     Patient Education   Personalized Prevention Plan  You are due for the preventive services outlined below.  Your care team is available to assist you in scheduling these services.  If you have already completed any of these items, please share that information with your care team to update in your medical record.  Health Maintenance   Topic Date Due   ? ZOSTER VACCINES (1 of 2) 01/13/2004   ? PNEUMOCOCCAL POLYSACCHARIDE VACCINE AGE 65 AND OVER  09/19/2019 (Originally 1/13/2019)   ? COLONOSCOPY  01/19/2020   ? FALL RISK ASSESSMENT  06/18/2020   ? TD 18+  HE  07/03/2022   ? ADVANCE DIRECTIVES DISCUSSED WITH PATIENT  02/14/2023   ? INFLUENZA VACCINE RULE BASED  Completed   ? TDAP ADULT ONE TIME DOSE  Completed   ? PNEUMOCOCCAL CONJUGATE VACCINE FOR ADULTS (PCV13 OR PREVNAR)  Completed

## 2021-06-17 NOTE — PATIENT INSTRUCTIONS - HE
Patient Instructions by Trinh Macias RN at 9/4/2019  1:00 PM     Author: Trinh Macias RN Service: -- Author Type: Registered Nurse    Filed: 9/4/2019 11:45 AM Encounter Date: 9/4/2019 Status: Signed    : Trinh Macias RN (Registered Nurse)       Ankle-Brachial Index (MACK) or Physiologic Test    Description  An ankle-brachial index test is relatively pain free. Blood pressure cuffs of various sizes are placed on your thigh, calf, foot and toes.  Similar to having your blood pressure checked with an arm cuff, as the technician inflates the cuffs, they progressively tighten and are then quickly released.  You may feel some discomfort, but generally for less than 60 seconds for each measurement. You will be asked to remove your socks and shoes and possibly your pants or shorts. Gowns will be provided. It usually takes about 30-60 minutes.   Depending on the initial readings and patient symptoms, you may be asked to perform a light walk on a treadmill.  The technician will apply ultrasound gel, usually warmed for your comfort, to your ankles and wrists. Through the gel, the technician will use a small hand-held device that emits sound waves.  Risks  There are typically no side effects or complications associated with a physiologic study.  How to Prepare  Eat and take medications as usual.  There is no preparation required for an ankle-brachial index (MACK) or physiologic exam.  What Can I Expect After the Test?  The technician will send the ultrasound images to your vascular surgeon for evaluation. Typically, a report is available in 2-3 days. If anything critical is found, it is standard practice to notify the vascular surgeon immediately.  Reference: [email or web address]-resources/vascular-tests    Lower Extremity Arterial Ultrasound    Description  Ultrasound examinations are painless and easy for the patient. The vascular laboratory will contain a bed and just two or three pieces of equipment.  You will be asked to remove pants or shorts and gowns will be provided. It usually takes about 30 minutes.  The technician will tuck a towel under your underpants in the groin. The gel is water-soluble and will not stain your skin or clothes.  Ultrasound gel, usually warmed for your comfort, will be placed on the inner side of your legs.    Through the gel, the technician will apply to your legs a small hand-held device that emits sound waves.  When the test is completed, the technician will remove excess gel from your legs.    Risks  There are typically no side effects or complications associated with a lower extremity arterial duplex ultrasound.  How to Prepare  Eat and take medications as usual.  There is no preparation required for a lower extremity arterial duplex ultrasound.  What Can I Expect After the Test?  The technician will send the ultrasound images to your vascular surgeon for evaluation. Typically, a report is available in 2-3 days. If anything critical is found, it is standard practice to notify the vascular surgeon immediately.  Reference: [email or web address]-resources/vascular-tests

## 2021-06-17 NOTE — PATIENT INSTRUCTIONS - HE
Patient Instructions by Jonathan Morris RN at 7/18/2019  1:00 PM     Author: Jonathan Morris RN Service: -- Author Type: Registered Nurse    Filed: 7/18/2019  2:04 PM Encounter Date: 7/18/2019 Status: Addendum    : Jonathan Morris RN (Registered Nurse)    Related Notes: Original Note by Jonathan Morris RN (Registered Nurse) filed at 7/18/2019  1:30 PM       You have been scheduled for the following procedure:    Procedure: Right leg Angiogram    Date: 8/23/19    Arrival Time: 9am    Procedure Time: 10am    Location: [facility]    Please report to the information desk located in the [place] entrance on [address] at [facility] or the Main Radiology desk on the ground level at [facility].  Tell them you are scheduled for an appointment in Interventional Radiology.  They will then direct you to the Surgical Admit Unit or the Main Radiology desk.    Please arrive at the hospital by 9AM am on the day of your procedure.  (Note your scheduled arrival time will be earlier than your scheduled procedure start time to allow for your pre-procedure exam, lab work, preparation and consent.)    Other Instructions:    Do not eat or drink anything after midnight before your procedure.    You may take your normal medications on the morning of your procedure with a few sips of water (unless otherwise instructed)    Please inform us if you are taking insulin, Glucophage (Metformin), Coumadin, Arixtra, or Lovenox. START PLAVIX 3 DAYS PRIOR TO ANGIO.     Please bring a current list of medications with    If you are having an angiogram:    You will need a responsible adult to stay with you at home your first night.    You will need a     You will need to hold your Coumadin 5 days before your angiogram and should consult with your primary regarding this.    Hold your Metformin the night before and morning of angiogram    Take only 1/2 of your bedtime insulin dose and hold your morning dose but bring it with you to the angiogram.        If you  have any questions regarding your procedure, your instructions or should you need to reschedule or cancel your procedure, please contact Dorothy at the [facility].       Peripheral Angiography  Peripheral angiography is an outpatient procedure that makes a map of the vessels (arteries) in your lower body, legs, and arms, using X-ray and dye.This map can show where blood flow may be blocked.  Talk with your healthcare provider about the risks and complications of angiography.   Before the procedure  Prepare for the peripheral angiography as follows:     Tell your healthcare provider about all medicines you take and any allergies you may have.    Follow any directions youre given for not eating or drinking before the procedure. If your provider says to take your normal medicines, swallow them with only small sips of water.    Arrange for a family member or friend to drive you home.  During the procedure  Here is what to expect:      You may get medicine through an IV (intravenous) line to relax you. Youre given an injection to numb the insertion site. Then, a tiny skin cut (incision) is made near an artery in your groin.    Your provider inserts a thin tube (catheter) through the incision. He or she then threads the catheter into an artery while looking at a video monitor.    Contrast dye is injected into the catheter to confirm position. You may feel warmth or pressure in your legs and back. You lie still as X-rays are taken. The catheter is then taken out.  After the procedure  Youll be taken to a recovery area. A healthcare provider will apply pressure to the site for about 10 minutes. Your healthcare provider will tell you how long to lie down and keep the insertion site still. Your healthcare provider will discuss the results with you soon after the procedure.  Back at home  On the day you get home, dont drive, dont exercise, avoid walking and taking stairs, and avoid bending and lifting. Your  healthcare provider may give you other care instructions.  Call your healthcare provider  Call your healthcare provider right away if:    You notice a lump or bleeding at the insertion site    You feel pain at the insertion site    You become lightheaded or dizzy    You have leg pain or numbness    You do not urinate in 8 hours    Date Last Reviewed: 5/1/2016 2000-2017 [employer]. [address] [place] [address]. All rights reserved. This information is not intended as a substitute for professional medical care. Always follow your healthcare professional's instructions.    Angiogram Procedure Discharge Instructions:     1. If you received sedation for your procedure: Do not drive or operate heavy machinery for the rest of the day.     2. Avoid strenuous activity for 72 hours (3 days):                        - Do not lift greater than 10 pounds.                        - Excessive exercise                        - Straining                        - Return to your normal activities as you tolerate after the 3 days restriction     3. Avoid tub baths, Jacuzzis, hot tubs and pools for 72 hours (3 days) or until puncture site is will healed.     4. You may shower beginning tomorrow. Do not scrub puncture site(s) until well healed, pat dry.     5. You can expect to return to work 1-2 days after your procedure - depending on the nature of your profession.     6. It is normal to have some tenderness and minimal swelling at puncture site. A small area of discoloration may be present. Tenderness typically subsides in 24-48 hours. A small knot may also be present at puncture site for 6-8 weeks, this can be a normal part of the healing process.     After the angiogram If you:      1. Experience any bleeding or active swelling from puncture site: Lie down, firmly apply pressure to puncture site and CALL 9-1-1     2. Fever greater than 101 degrees Fahrenheit.     3. Redness, swelling, warmth to touch, or  purulent (yellow/green/foul smelling) drainage from the puncture site.     4. Increasing pain, tenderness or swelling at puncture site OR of arm/leg near puncture site.     5. Feeling weak or faint.     6. Change in color, temperature, or sensation of arm/leg where puncture was made.     Call us with any other questions or concerns after your procedure: [phone]    You will need to have an ultrasound 2-3 weeks after your angiogram and should be scheduled at the time of your follow up appt.  Further follow up will be based on ultrasound results. Typical follow up is every 3 months for the first year, then every 6 months to one year thereafter.

## 2021-06-18 NOTE — PATIENT INSTRUCTIONS - HE
Patient Instructions by Valerie Haines RN at 9/9/2020  3:00 PM     Author: Valerie Haines RN Service: -- Author Type: Registered Nurse    Filed: 9/9/2020  2:00 PM Encounter Date: 9/9/2020 Status: Addendum    : Valerie Haines RN (Registered Nurse)    Related Notes: Original Note by Valerie Haines RN (Registered Nurse) filed at 9/9/2020  2:00 PM       Ankle-Brachial Index (MACK) or Physiologic Test    Description  An ankle-brachial index test is relatively pain free. Blood pressure cuffs of various sizes are placed on your thigh, calf, foot and toes.  Similar to having your blood pressure checked with an arm cuff, as the technician inflates the cuffs, they progressively tighten and are then quickly released.  You may feel some discomfort, but generally for less than 60 seconds for each measurement. You will be asked to remove your socks and shoes and possibly your pants or shorts. Gowns will be provided. It usually takes about 30-60 minutes.   Depending on the initial readings and patient symptoms, you may be asked to perform a light walk on a treadmill.  The technician will apply ultrasound gel, usually warmed for your comfort, to your ankles and wrists. Through the gel, the technician will use a small hand-held device that emits sound waves.  Risks  There are typically no side effects or complications associated with a physiologic study.  How to Prepare  Eat and take medications as usual.  There is no preparation required for an ankle-brachial index (MACK) or physiologic exam.  What Can I Expect After the Test?  The technician will send the ultrasound images to your vascular surgeon for evaluation. Typically, a report is available in 2-3 days. If anything critical is found, it is standard practice to notify the vascular surgeon immediately.  Reference: [email or web address]/patient-resources/vascular-tests    Lower Extremity Arterial Ultrasound    Description  Ultrasound examinations are painless and easy for the  patient. The vascular laboratory will contain a bed and just two or three pieces of equipment. You will be asked to remove pants or shorts and gowns will be provided. It usually takes about 30 minutes.  The technician will tuck a towel under your underpants in the groin. The gel is water-soluble and will not stain your skin or clothes.  Ultrasound gel, usually warmed for your comfort, will be placed on the inner side of your legs.    Through the gel, the technician will apply to your legs a small hand-held device that emits sound waves.  When the test is completed, the technician will remove excess gel from your legs.    Risks  There are typically no side effects or complications associated with a lower extremity arterial duplex ultrasound.  How to Prepare  Eat and take medications as usual.  There is no preparation required for a lower extremity arterial duplex ultrasound.  What Can I Expect After the Test?  The technician will send the ultrasound images to your vascular surgeon for evaluation. Typically, a report is available in 2-3 days. If anything critical is found, it is standard practice to notify the vascular surgeon immediately.  Reference: https://vascular.org/patient-resources/vascular-tests

## 2021-06-18 NOTE — PATIENT INSTRUCTIONS - HE
Patient Instructions by Trinh Macias RN at 12/16/2020  9:40 AM     Author: Trinh Macias RN Service: -- Author Type: Registered Nurse    Filed: 12/16/2020 10:18 AM Encounter Date: 12/16/2020 Status: Signed    : Trinh Macias RN (Registered Nurse)       Patient Education       Taking Plavix  Plavix (clopidogrel) is a medication often prescribed for people with arteriosclerosis. This is the condition where plaque (a fatty material) builds up in artery walls, narrowing the channel where blood flows. A blood clot can then form on the plaque. This may block the artery, cutting off blood flow and causing a heart attack (also known as acute myocardial infarction or AMI) or stroke. Plavix can help prevent these clots.    What Plavix Does  Plavix is an antiplatelet medication. It helps make platelets less sticky and less likely to cause clots to form. This reduces the risk of blockage. Plavix can be taken daily by people at high risk of heart attack or stroke. It is also used after placement of a stent (tiny wire mesh tube) in an artery. This helps prevent blood clots from forming on the stent. Your healthcare provider can explain the risks and benefits of taking Plavix.  Taking Plavix Safely  Tell your health care provider about any other medications you take. Also, mention if you have a history of ulcers or bleeding problems. Ask whether you will need to stop taking Plavix before having surgery or dental work. Always take medications as directed.  Important: Do not stop taking Plavix without talking to the health care provider who prescribed this medication.   Tips for Taking Plavix    Develop a routine. For example, take Plavix with the same meal each day.    Dont skip doses. Plavix needs to be taken daily to be effective.    Talk with your pharmacist. Ask what to do if you skip a dose.    Keep track of what you take. A pillbox with days of the week can help, especially if you take several medications. Or  use a list or chart to keep track.  When to Call the Your Health Care Provider  Side effects of taking Plavix are uncommon. If you do have problems, your health care provider may suggest a dosage change. Call your health care provider if you have:    Excessive bruising (some bruising is normal).    Bleeding that wont stop.    Upset stomach, stomach pain, constipation, or other digestive problems.    Headache or dizziness.     1902-0249 [employer]. [address] [place] [address], [place] [address]. All rights reserved. This information is not intended as a substitute for professional medical care. Always follow your healthcare professional's instructions.

## 2021-06-18 NOTE — PATIENT INSTRUCTIONS - HE
Patient Instructions by Elmo Crump MD at 8/24/2020  2:40 PM     Author: Elmo Crump MD Service: -- Author Type: Physician    Filed: 8/24/2020  3:53 PM Encounter Date: 8/24/2020 Status: Addendum    : Elmo Crump MD (Physician)    Related Notes: Original Note by Elmo Crump MD (Physician) filed at 8/24/2020  3:46 PM       Start prednisone to treat gout    When prednisone dose down to 5 mg daily begin allopurinol.    Start Lisinopril 10 mg daily for blood pressure    Schedule lab visit to get blood drawn for lab tests.  Patient Education     Exercise for a Healthier Heart  You may wonder how you can improve the health of your heart. If youre thinking about exercise, youre on the right track. You dont need to become an athlete, but you do need a certain amount of brisk exercise to help strengthen your heart. If you have been diagnosed with a heart condition, your doctor may recommend exercise to help stabilize your condition. To help make exercise a habit, choose safe, fun activities.       Be sure to check with your health care provider before starting an exercise program.    Why exercise?  Exercising regularly offers many healthy rewards. It can help you do all of the following:    Improve your blood cholesterol levels to help prevent further heart trouble    Lower your blood pressure to help prevent a stroke or heart attack    Control diabetes, or reduce your risk of getting this disease    Improve your heart and lung function    Reach and maintain a healthy weight    Make your muscles stronger and more limber so you can stay active    Prevent falls and fractures by slowing the loss of bone mass (osteoporosis)    Manage stress better  Exercise tips  Ease into your routine. Set small goals. Then build on them.  Exercise on most days. Aim for a total of 150 or more minutes of moderate to  vigorous intensity activity each week. Consider 40 minutes, 3 to 4 times a week. For best  results, activity should last for 40 minutes on average. It is OK to work up to the 40 minute period over time. Examples of moderate-intensity activity is walking one mile in 15 minutes or 30 to 45 minutes of yard work.  Step up your daily activity level. Along with your exercise program, try being more active throughout the day. Walk instead of drive. Do more household tasks or yard work.  Choose one or more activities you enjoy. Walking is one of the easiest things you can do. You can also try swimming, riding a bike, or taking an exercise class.  Stop exercising and call your doctor if you:    Have chest pain or feel dizzy or lightheaded    Feel burning, tightness, pressure, or heaviness in your chest, neck, shoulders, back, or arms    Have unusual shortness of breath    Have increased joint or muscle pain    Have palpitations or an irregular heartbeat      8347-5641 [employer]. [address] [place] [address]. All rights reserved. This information is not intended as a substitute for professional medical care. Always follow your healthcare professional's instructions.         Patient Education   Understanding USDA MyPlate  The USDA (US Department of Agriculture) has guidelines to help you make healthy food choices. These are called MyPlate. MyPlate shows the food groups that make up healthy meals using the image of a place setting. Before you eat, think about the healthiest choices for what to put onto your plate or into your cup or bowl. To learn more about building a healthy plate, visit www.choosemyplate.gov.       The Food Groups    Fruits: Any fruit or 100% fruit juice counts as part of the Fruit Group. Fruits may be fresh, canned, frozen, or dried, and may be whole, cut-up, or pureed. Make half your plate fruits and vegetables.    Vegetables: Any vegetable or 100% vegetable juice counts as a member of the Vegetable Group. Vegetables may be fresh, frozen, canned, or dried. They can  be served raw or cooked and may be whole, cut-up, or mashed. Make half your plate fruits and vegetables.     Grains: All foods made from grains are part of the Grains Group. These include wheat, rice, oats, cornmeal, and barley such as bread, pasta, oatmeal, cereal, tortillas, and grits. Grains should be no more than a quarter of your plate. At least half of your grains should be whole grains.    Protein: This group includes meat, poultry, seafood, beans and peas, eggs, processed soy products (like tofu), nuts (including nut butters), and seeds. Make protein choices no more than a quarter of your plate. Meat and poultry choices should be lean or low fat.    Dairy: All fluid milk products and foods made from milk that contain calcium, like yogurt and cheese are part of the Dairy Group. (Foods that have little calcium, such as cream, butter, and cream cheese, are not part of the group.) Most dairy choices should be low-fat or fat-free.    Oils: These are fats that are liquid at room temperature. They include canola, corn, olive, soybean, and sunflower oil. Foods that are mainly oil include mayonnaise, certain salad dressings, and soft margarines. You should have only 5 to 7 teaspoons of oils a day. You probably already get this much from the food you eat.  Use [employer] to Help Build Your Meals  The SuperTracker can help you plan and track your meals and activity. You can look up individual foods to see or compare their nutritional value. You can get guidelines for what and how much you should eat. You can compare your food choices. And you can assess personal physical activities and see ways you can improve. Go to www.choosemyplate.gov/supertracker/.    4133-4082 [employer]. [address] [place] [address], [place] [address]. All rights reserved. This information is not intended as a substitute for professional medical care. Always follow your healthcare professional's instructions.           Patient  Education   Understanding Advance Care Planning  Advance care planning is the process of deciding ones own future medical care. It helps ensure that if you cant speak for yourself, your wishes can still be carried out. The plan is a series of legal documents that note a persons wishes. The documents vary by state. Advance care planning may be done when a person has a serious illness that is expected to get worse. It may be done before major surgery. And it can help you and your family be prepared in case of a major illness or injury. Advance care planning helps with making decisions at these times.       A health care proxy is a person who acts as the voice of a patient when the patient cant speak for himself or herself. The name of this role varies by state. It may be called a Durable Medical Power of  or Durable Power of  for Healthcare. It may be called an agent, surrogate, or advocate. Or it may be called a representative or decision maker. It is an official duty that is identified by a legal document. The document also varies by state.    Why Is Advance Care Planning Important?  If a person communicates their healthcare wishes:    They will be given medical care that matches their values and goals.    Their family members will not be forced to make decisions in a crisis with no guidance.  Creating a Plan  Making an advance care plan is often done in 3 steps:    Thinking about ones wishes. To create an advance care plan, you should think about what kind of medical treatment you would want if you lose the ability to communicate. Are there any situations in which you would refuse or stop treatment? Are there therapies you would want or not want? And whom do you want to make decisions for you? There are many places to learn more about how to plan for your care. Ask your doctor or  for resources.    Picking a health care proxy. This means choosing a trusted person to speak for you only  when you cant speak for yourself. When you cannot make medical decisions, your proxy makes sure the instructions in your advance care plan are followed. A proxy does not make decisions based on his or her own opinions. They must put aside those opinions and values if needed, and carry out your wishes.    Filling out the legal documents. There are several kinds of legal documents for advance care planning. Each one tells health care providers your wishes. The documents may vary by state. They must be signed and may need to be witnessed or notarized. You can cancel or change them whenever you wish. Depending on your state, the documents may include a Healthcare Proxy form, Living Will, Durable Medical Power of , Advance Directive, or others.  The Familys Role  The best help a family can give is to support their loved ones wishes. Open and honest communication is vital. Family should express any concerns they have about the patients choices while the patient can still make decisions.    6334-8162 [employer]. [address] [place] [address]. All rights reserved. This information is not intended as a substitute for professional medical care. Always follow your healthcare professional's instructions.         Also, [facility] offers a free, downloadable health care directive that allows you to share your treatment choices and personal preferences if you cannot communicate your wishes. It also allows you to appoint another person (called a health care agent) to make health care decisions if you are unable to do so. You can download an advance directive by going here: [email or web address]     Patient Education   Personalized Prevention Plan  You are due for the preventive services outlined below.  Your care team is available to assist you in scheduling these services.  If you have already completed any of these items, please share that information  with your care team to update in your medical record.  Health Maintenance   Topic Date Due   ? HEPATITIS C SCREENING  1955   ? MEDICARE ANNUAL WELLNESS VISIT  06/18/2020   ? FALL RISK ASSESSMENT  06/18/2020   ? INFLUENZA VACCINE RULE BASED (1) 08/01/2020   ? PNEUMOCOCCAL IMMUNIZATION 65+ LOW/MEDIUM RISK (2 of 2 - PPSV23) 03/19/2020   ? ADVANCE CARE PLANNING  02/14/2023   ? LIPID  06/18/2024   ? TD 18+ HE  02/19/2030   ? COLORECTAL CANCER SCREENING  02/26/2030   ? ZOSTER VACCINES  Completed   ? HEPATITIS B VACCINES  Aged Out

## 2021-06-18 NOTE — PATIENT INSTRUCTIONS - HE
Patient Instructions by Trinh Macias RN at 3/4/2020  2:00 PM     Author: Trinh Macias RN Service: -- Author Type: Registered Nurse    Filed: 3/4/2020  2:28 PM Encounter Date: 3/4/2020 Status: Signed    : Trinh Macias RN (Registered Nurse)       You have been scheduled for the following procedure:    Procedure: Angiogram of the right leg    Date: 4/03/2020    Arrival Time:7am    Procedure Time: 8am    Location: [facility]    Please report to the information desk located in the [place] entrance on [address] at [facility] or the Main Radiology desk on the ground level at [facility].  Tell them you are scheduled for an appointment in Interventional Radiology.  They will then direct you to the Surgical Admit Unit or the Main Radiology desk.    Please arrive at the hospital by 7 am on the day of your procedure.  (Note your scheduled arrival time will be earlier than your scheduled procedure start time to allow for your pre-procedure exam, lab work, preparation and consent.)    Other Instructions:    Do not eat or drink anything after midnight before your procedure.    You may take your normal medications on the morning of your procedure with a few sips of water (unless otherwise instructed)    Please inform us if you are taking insulin, Glucophage (Metformin), Coumadin, Arixtra, or Lovenox.    Please bring a current list of medications with    If you are having an angiogram:    You will need a responsible adult to stay with you at home your first night.    You will need a     You will need to hold your Coumadin 5 days before your angiogram and should consult with your primary regarding this.    Hold your Metformin the night before and morning of angiogram    Take only 1/2 of your bedtime insulin dose and hold your morning dose but bring it with you to the angiogram.    It is ok  to stay on your aspirin unless the Vascular surgery directs you differently.        If you have any questions regarding  your procedure, your instructions or should you need to reschedule or cancel your procedure, please contact Dorothy at the [facility].       Peripheral Angiography  Peripheral angiography is an outpatient procedure that makes a map of the vessels (arteries) in your lower body, legs, and arms, using X-ray and dye.This map can show where blood flow may be blocked.  Talk with your healthcare provider about the risks and complications of angiography.   Before the procedure  Prepare for the peripheral angiography as follows:     Tell your healthcare provider about all medicines you take and any allergies you may have.    Follow any directions youre given for not eating or drinking before the procedure. If your provider says to take your normal medicines, swallow them with only small sips of water.    Arrange for a family member or friend to drive you home.  During the procedure  Here is what to expect:      You may get medicine through an IV (intravenous) line to relax you. Youre given an injection to numb the insertion site. Then, a tiny skin cut (incision) is made near an artery in your groin.    Your provider inserts a thin tube (catheter) through the incision. He or she then threads the catheter into an artery while looking at a video monitor.    Contrast dye is injected into the catheter to confirm position. You may feel warmth or pressure in your legs and back. You lie still as X-rays are taken. The catheter is then taken out.  After the procedure  Youll be taken to a recovery area. A healthcare provider will apply pressure to the site for about 10 minutes. Your healthcare provider will tell you how long to lie down and keep the insertion site still. Your healthcare provider will discuss the results with you soon after the procedure.  Back at home  On the day you get home, dont drive, dont exercise, avoid walking and taking stairs, and avoid bending and lifting. Your healthcare provider may give you  other care instructions.  Call your healthcare provider  Call your healthcare provider right away if:    You notice a lump or bleeding at the insertion site    You feel pain at the insertion site    You become lightheaded or dizzy    You have leg pain or numbness    You do not urinate in 8 hours    Date Last Reviewed: 5/1/2016 2000-2017 [employer] [address] [place] [address]. All rights reserved. This information is not intended as a substitute for professional medical care. Always follow your healthcare professional's instructions.    Angiogram Procedure Discharge Instructions:     1. If you received sedation for your procedure: Do not drive or operate heavy machinery for the rest of the day.     2. Avoid strenuous activity for 72 hours (3 days):                        - Do not lift greater than 10 pounds.                        - Excessive exercise                        - Straining                        - Return to your normal activities as you tolerate after the 3 days restriction     3. Avoid tub baths, Jacuzzis, hot tubs and pools for 72 hours (3 days) or until puncture site is will healed.     4. You may shower beginning tomorrow. Do not scrub puncture site(s) until well healed, pat dry.     5. You can expect to return to work 1-2 days after your procedure - depending on the nature of your profession.     6. It is normal to have some tenderness and minimal swelling at puncture site. A small area of discoloration may be present. Tenderness typically subsides in 24-48 hours. A small knot may also be present at puncture site for 6-8 weeks, this can be a normal part of the healing process.     After the angiogram If you:      1. Experience any bleeding or active swelling from puncture site: Lie down, firmly apply pressure to puncture site and CALL 9-1-1     2. Fever greater than 101 degrees Fahrenheit.     3. Redness, swelling, warmth to touch, or purulent (yellow/green/foul  smelling) drainage from the puncture site.     4. Increasing pain, tenderness or swelling at puncture site OR of arm/leg near puncture site.     5. Feeling weak or faint.     6. Change in color, temperature, or sensation of arm/leg where puncture was made.     Call us with any other questions or concerns after your procedure: [phone]    You will need to have an ultrasound 2-3 weeks after your angiogram and should be scheduled at the time of your follow up appt.  Further follow up will be based on ultrasound results. Typical follow up is every 3 months for the first year, then every 6 months to one year thereafter.

## 2021-06-20 NOTE — LETTER
Letter by Marisol Silver RN at      Author: Marisol Silver RN Service: -- Author Type: --    Filed:  Encounter Date: 5/21/2020 Status: (Other)       5/21/2020        [address] [place] [address]    This letter provides a written record that you were tested for COVID-19 on 5/20/20.     Your result was negative.    This means that we didnt find the virus that causes COVID-19 in your sample. A test may show negative when you do actually have the virus. This can happen when the virus is in the early stages of infection, before you feel illness symptoms.    Even if you dont have symptoms, they may still appear. For safety, its very important to follow these rules.    Keep yourself away from others (self-isolation):      Stay home. Dont go to work, school or anywhere else.     Stay in your own room (and use your own bathroom), if you can.    Stay away from others in your home. No hugging, kissing or shaking hands. No visitors.    Clean high touch surfaces often (doorknobs, counters, handles, etc.). Use a household cleaning spray or wipes.    Cover your mouth and nose with a mask, tissue or washcloth to avoid spreading germs.    Wash your hands and face often with soap and water.    Stay in self-isolation until you meet ALL of the guidelines below:    1. You have had no fever for at least 72 hours (that is 3 full days of no fever without the use of medicine that reduces fevers), AND  2. other symptoms (such as cough, shortness of breath) have gotten better, AND  3. at least 10 days have passed since your symptoms first appeared.    Going back to work  Check with your employer for any guidelines to follow for going back to work.    Employers: This document serves as formal notice that your employee tested negative for COVID-19, as of the testing date shown above.    For questions regarding this letter or your Negative COVID-19 result, call [phone] between 8A to 6:30P (M-F) and 10A  to 6:30P (weekends).

## 2021-06-22 NOTE — PROGRESS NOTES
Preoperative Exam    Scheduled Procedure: Left Total Hip Replacement   Surgery Date:  12/28/2018  Surgery Location: [facility], fax [phone]    Surgeon:      Assessment/Plan:     1.  Preoperative history and physical for left total hip arthroplasty  2.  Severe osteoarthritis left hip  3.  History of gout  4.  Gastroesophageal reflux disease  5.  Hyperlipidemia      Surgical Procedure Risk: Low (reported cardiac risk generally < 1%)  Have you had prior anesthesia?: Yes  Have you or any family members had a previous anesthesia reaction:  No  Do you or any family members have a history of a clotting or bleeding disorder?: No  Cardiac Risk Assessment: no increased risk for major cardiac complications    Patient approved for surgery with general or local anesthesia.      Functional Status: Independent  Patient plans to recover at home with family.     Subjective:      Dung Negrete is a 63 y.o. male who presents for a preoperative consultation.  Over the last year he has had progressively worsening pain in the left hip.  He has been diagnosed with severe osteoarthritis of the hip.  He gets significant pain especially with weightbearing and walking.  At rest his pain is fairly well controlled.  He is now scheduled for a left total hip arthroplasty.  He had his right hip replaced 3 years ago.  That went quite well.  Recently he has had some mild congestion.  He thinks that is getting better however.  He denies any high fevers at this point in time.  He has never reacted to anesthesia adversely.  There is no family history of anesthesia concerns.    All other systems reviewed and are negative, other than those listed in the HPI.    Pertinent History  Do you have difficulty breathing or chest pain after walking up a flight of stairs: No  History of obstructive sleep apnea: No  Steroid use in the last 6 months: No  Frequent Aspirin/NSAID use: Yes: Low dose Aspirin and  ibuprofen   Prior Blood Transfusion: No  Prior Blood Transfusion Reaction: No  If for some reason prior to, during or after the procedure, if it is medically indicated, would you be willing to have a blood transfusion?:  There is no transfusion refusal.    Current Outpatient Medications   Medication Sig Dispense Refill     aspirin 81 MG EC tablet Take 81 mg by mouth daily.       cholecalciferol, vitamin D3, (VITAMIN D3) 400 unit cap Take 1 capsule by mouth daily.       lansoprazole (PREVACID) 30 MG capsule TAKE 1 CAPSULE (30 MG TOTAL) BY MOUTH DAILY. 90 capsule 1     MULTIVITAMIN ORAL Take 1 tablet by mouth daily.       simvastatin (ZOCOR) 40 MG tablet Take 1 tablet (40 mg total) by mouth daily. 90 tablet 2     loratadine 5 mg TbDL Take 5 mg by mouth daily.       No current facility-administered medications for this visit.         No Known Allergies    Patient Active Problem List   Diagnosis     Mixed hyperlipidemia     Gout     Neck Sprain     Dyspnea     GERD (gastroesophageal reflux disease)     Polyarthralgia     Primary osteoarthritis involving multiple joints       No past medical history on file.    No past surgical history on file.    Social History     Socioeconomic History     Marital status:      Spouse name: Not on file     Number of children: Not on file     Years of education: Not on file     Highest education level: Not on file   Social Needs     Financial resource strain: Not on file     Food insecurity - worry: Not on file     Food insecurity - inability: Not on file     Transportation needs - medical: Not on file     Transportation needs - non-medical: Not on file   Occupational History     Not on file   Tobacco Use     Smoking status: Former Smoker     Last attempt to quit: 2014     Years since quittin.5     Smokeless tobacco: Former User     Quit date: 2005   Substance and Sexual Activity     Alcohol use: Yes     Comment: occasional on weekends     Drug use: Yes     Types:  "Marijuana     Sexual activity: Yes     Partners: Female   Other Topics Concern     Not on file   Social History Narrative     Not on file       Patient Care Team:  Iván Read MD as PCP - General          Objective:     Vitals:    12/05/18 0927   BP: 144/88   Pulse: 76   Resp: 16   Temp: 97.6  F (36.4  C)   TempSrc: Oral   Weight: 212 lb (96.2 kg)   Height: 5' 11\" (1.803 m)         Physical Exam:  HEENT: Conjunctiva clear.  Both TMs are gray.  Sinuses nontender.  Nasal mucosa minimally inflamed.  Good airflow noted through the nares.  Pharynx reveals minimal erythema.  No exudate noted.  Uvula is midline.  Neck: Neck reveals no lymphadenopathy.  No bruits heard.  No thyromegaly noted.  Lungs: Lungs today are clear.  Patient is in no respiratory distress.  No wheezes heard.  No rales are present.  Cardiac: There is a regular rhythm present.  No murmur heard today.  Abdomen: Abdomen is soft and nontender.  No masses noted.  No hepatomegaly present.  Bowel sounds are active throughout.  No distention of the abdomen noted.  Musculoskeletal: The right hip has a well-healed scar over it.  Good range of motion noted.  Left hip has minimal palpable tenderness.  There is pain on internal and external rotation of the hip.  No edema noted in the feet.  Pulses are strong in the feet.  Sensory exam normal.    There are no Patient Instructions on file for this visit.    EKG: Please see copy on the chart    Labs:  Labs pending at this time.  Results will be reviewed when available.    Immunization History   Administered Date(s) Administered     DT (pediatric) 08/09/1998     Hep A, historic 08/25/2010, 07/03/2012     Influenza, seasonal,quad inj 36+ mos 02/14/2018     Td,adult,historic,unspecified 08/09/1998     Tdap 07/03/2012           Electronically signed by Iván Read MD 12/05/18 9:31 AM    "

## 2021-06-24 NOTE — TELEPHONE ENCOUNTER
Former patient of Jacek & has not established care with another provider.  Please assign refill request to covering provider per Clinic standard process.      Refill Approved    Rx renewed per Medication Renewal Policy. Medication was last renewed on 6/9/18.    Bridgette Amador, [facility] Connection Triage/Med Refill 2/26/2019     Requested Prescriptions   Pending Prescriptions Disp Refills     simvastatin (ZOCOR) 40 MG tablet 90 tablet 2     Sig: Take 1 tablet (40 mg total) by mouth daily.    Statins Refill Protocol (Hmg CoA Reductase Inhibitors) Passed - 2/26/2019  2:50 PM       Passed - PCP or prescribing provider visit in past 12 months     Last office visit with prescriber/PCP: 5/3/2017 Iván Read MD OR same dept: Visit date not found OR same specialty: 5/3/2017 Iván Read MD  Last physical: 12/5/2018 Last MTM visit: Visit date not found   Next visit within 3 mo: Visit date not found  Next physical within 3 mo: Visit date not found  Prescriber OR PCP: Iván Read MD  Last diagnosis associated with med order: 1. Hyperlipidemia  - simvastatin (ZOCOR) 40 MG tablet; Take 1 tablet (40 mg total) by mouth daily.  Dispense: 90 tablet; Refill: 2    If protocol passes may refill for 12 months if within 3 months of last provider visit (or a total of 15 months).

## 2021-06-24 NOTE — TELEPHONE ENCOUNTER
The patient is listing Dr. Read as a primary care provider. Dr. Read retired from clinic practice December 2018. The patient is due to establish care with a new provider. The writer intends to contact the patient to assist with the scheduling process. If the patient has established care elsewhere, please discontinue Jacek as the primary physician and close.

## 2021-06-24 NOTE — TELEPHONE ENCOUNTER
Called pt to relay Zocor was refilled yesterday.  Pt still has one month left of current med.  Pt asked if CMT can call [facility] when opens to dc auto fill and IF they have filled the script, please put back and pt will contact [facility] when ready for next fill.      Pt states he is going to another HE clinic for future care.  Thanks.     Call [facility] when they open to relay above message. Thanks.

## 2021-06-25 NOTE — PROGRESS NOTES
" Patient ID: Dung Negrete is a 64 y.o. male.  /76   Pulse 72   Ht 5' 11\" (1.803 m)   Wt 215 lb 6.4 oz (97.7 kg)   SpO2 98%   BMI 30.04 kg/m      Assessment/Plan:                Diagnoses and all orders for this visit:    Primary osteoarthritis involving multiple joints    Hyperlipidemia  -     simvastatin (ZOCOR) 40 MG tablet  Dispense: 90 tablet; Refill: 2    Gout    Gastroesophageal reflux disease without esophagitis    Other orders  -     Cancel: Pneumococcal conjugate vaccine 13-valent 6wks-17yrs; >50yrs  -     indomethacin (INDOCIN) 50 MG capsule  Dispense: 30 capsule; Refill: 0  -     Pneumococcal conjugate vaccine 13-valent 6wks-17yrs; >50yrs      DISCUSSION  Discussed health history, social history and family history as noted below.  Continue current medications.  Consider gout preventive if indicated.  Discussed use of indomethacin extensively.  Acknowledge the risks associated with use of indomethacin.  He understands this and will proceed accordingly.  Will wait on pneumococcal vaccine.  Reviewed other routine health prevention.  Recommended follow-up to address other health preventive issues in the future.  Subjective:     HPI    Dung Negrete is a 64 y.o. male who is a new patient to this clinic.  He had previously been seen by Dr. Read.  His last visit was in December 2018 just prior to left total hip replacement.  Patient reports that that is going well.  His medical history is noted to include hyperlipidemia, gout, acid reflux as well as the osteoarthritis for which she had surgery.    Patient reports that the current time everything is going well.  He does not report any recent gout flareups.  He reports he continues to do self-directed therapy for recovery from his hip surgery.  He reports no concerns.  He reports no concerns about acid reflux.  Denies any symptoms of vascular disease.  No chest pain or shortness of breath no dizziness lightheadedness or syncope.    Social " "history patient is  has 3 adult children.  He is a retired .  He does not smoke.  He quit smoking about 4 years ago.  He drinks about 4-6 alcoholic beverages per week.  No history of drug use.  Does not exercise on a regular basis.  [place]    Family history: Patient reports one brother with coronary artery bypass graft surgery and another sister with heart disease.  Dad had gout he does have 13 siblings altogether.  He knows one brother had cancer but he is not certain of the type.    Review of Systems  Complete review of systems is obtained.  Other than the specific considerations noted above complete review of systems is negative.          Objective:   Medications:  Current Outpatient Medications   Medication Sig     aspirin 81 MG EC tablet Take 81 mg by mouth daily.     cholecalciferol, vitamin D3, (VITAMIN D3) 400 unit cap Take 1 capsule by mouth daily.     indomethacin (INDOCIN) 50 MG capsule Take 1 capsule (50 mg total) by mouth 3 (three) times a day with meals.     lansoprazole (PREVACID) 30 MG capsule Take 1 capsule (30 mg total) by mouth daily.     loratadine 5 mg TbDL Take 5 mg by mouth daily.     MULTIVITAMIN ORAL Take 1 tablet by mouth daily.     simvastatin (ZOCOR) 40 MG tablet Take 1 tablet (40 mg total) by mouth daily.       Allergies:  No Known Allergies    Tobacco:   reports that he quit smoking about 4 years ago. He quit smokeless tobacco use about 14 years ago.     Physical Exam          /76   Pulse 72   Ht 5' 11\" (1.803 m)   Wt 215 lb 6.4 oz (97.7 kg)   SpO2 98%   BMI 30.04 kg/m          General Appearance:    Alert, cooperative, no distress   Eyes:   No scleral icterus or conjunctival irritation       Ears:    Normal TM's and external ear canals, both ears   Throat:   Lips, mucosa, and tongue normal; teeth and gums normal   Neck:   Supple, symmetrical, trachea midline, no adenopathy;        thyroid:  No " enlargement/tenderness/nodules   Lungs:     Clear to auscultation bilaterally, respirations unlabored, no wheezes or crackles   Heart:    Regular rate and rhythm,  No murmur   Abdomen:    Soft, no distention, no tenderness on palpation, no masses, no organomegaly     Extremities:  No edema, no joint swelling or redness, no evidence of any injuries   Skin:  No concerning skin findings, no suspicious moles, no rashes   Neurologic:  On gross examination there is no motor or sensory deficit.  Patient walks with a normal gait

## 2021-06-25 NOTE — PROGRESS NOTES
VASCULAR SURGERY OUTPATIENT CONSULT OR VISIT   VASCULAR SURGEON: Adry Mandel MD    LOCATION:  [facility]    Dung Negrete   Medical Record #:  [id number]  YOB: 1955  Age:  66 y.o.     Date of Service: 6/3/2021    PRIMARY CARE PROVIDER: Elmo Crump MD      Reason for consultation: Follow-up of PAD    IMPRESSION: Patient with bilateral SFA interventions for claudication.  On last visit started on cilostazol and tolerating it very well.  Tends to forget afternoon dose and wants to know if he can just take 2 pills in the morning.  Patient does notice left calf claudication when walking up a steep incline.  Also has some concerns about his remaining patent internal carotid artery.  ABIs bilaterally normal.  Did not undergo De La Paz Newman testing today.  Right leg SFA appears to be widely patent post intervention.  Left leg intervention not evaluated by duplex.  No carotid duplex done.  Separate from this noted to have irregular heart rhythm and some hypertension on visit today.  Not symptomatic.    RECOMMENDATION: Overall doing extremely well.  Strongly encouraged to continue with exercise regimen to improve his walking distances to improve his claudication symptoms.  I told him that I am not certain about the ramifications of switching his cilostazol dosing to 2 tablets every morning but I thought it was worth a try.  If he does develop side effects we will have to switch back.  Will reach out to his primary care provider about his hypertension and irregular heart rhythm: We did not know this at the time of his visit but only after he had left and so we will reach out to him and let him know as well.  We will plan on seeing him back in 6 months time with repeat ABIs at this time bilateral lower extremity arterial duplex as well as a carotid duplex to confirm that there is not any progression of carotid disease.    HPI:  Dung Negrete is a 66 y.o. male who was seen today in  surveillance of his PAD.  This gentleman who had had a left SFA intervention back in 2004 and for whom I performed a right leg intervention for claudication symptoms in 2019 and then again in 2020 for recurrent stenosis.  Last treatment was with drug-eluting balloon therapy.  Clinically been doing very well overall.  Is noting some left calf claudication when going [place] a [place] incl[place] at his summer home.  Has been using this as a source of exercise daily.  No rest pain or other issues.  Last visit we started him on cilostazol he is tolerating it well without diarrhea or other side effects.  Main issue is that he sometimes forgets the evening dose and would like to know if he can take it only in the mornings with a double dose.    Patient had had right ischemia and has a known right internal carotid artery occlusion.  No role for endarterectomy but he is concerned about his contralateral carotid.  On her last CTA that showed only mild disease and no stenosis.    Separate from all of this after the patient left we were informed of some hypertension and irregular heart rhythm on his monitor.  We did not get a chance to discuss this with the patient.    PHH:    Past Medical History:   Diagnosis Date     Cellulitis and abscess of leg 8/31/2006     Dyspnea 5/27/2015     Enthesopathy of knee 8/31/2006     GERD (gastroesophageal reflux disease) 1/8/2017     Health examination of defined subpopulation 12/27/2005     History of total hip arthroplasty, left 12/28/2018     Mixed hyperlipidemia     Created by Conversion      Neck Sprain     Created by Conversion      Polyarthralgia 5/17/2017     Primary osteoarthritis involving multiple joints 5/17/2017        Past Surgical History:   Procedure Laterality Date     ANGIOPLASTY Left 2007     IR EXTREMITY ANGIOGRAM RIGHT  8/23/2019     IR EXTREMITY ANGIOGRAM RIGHT  5/22/2020     TOTAL HIP ARTHROPLASTY Left 12/28/2019     TOTAL HIP ARTHROPLASTY Right        ALLERGIES:  Patient has no  known allergies.    MEDS:    Current Outpatient Medications:      allopurinoL (ZYLOPRIM) 300 MG tablet, Take 1 tablet (300 mg total) by mouth daily., Disp: 90 tablet, Rfl: 3     aspirin 81 MG EC tablet, Take 81 mg by mouth daily., Disp: , Rfl:      celecoxib (CELEBREX) 100 MG capsule, TAKE 1 CAPSULE BY MOUTH EVERY DAY, Disp: 90 capsule, Rfl: 3     cholecalciferol, vitamin D3, (VITAMIN D3) 400 unit cap, Take 1 capsule by mouth daily., Disp: , Rfl:      cilostazoL (PLETAL) 100 MG tablet, Take 1 tablet (100 mg total) by mouth 2 (two) times a day., Disp: 60 tablet, Rfl: 5     lansoprazole (PREVACID) 30 MG capsule, TAKE 1 CAPSULE BY MOUTH EVERY DAY, Disp: 90 capsule, Rfl: 2     lisinopriL (PRINIVIL,ZESTRIL) 20 MG tablet, Take 1 tablet (20 mg total) by mouth daily., Disp: 30 tablet, Rfl: 11     MULTIVITAMIN ORAL, Take 1 tablet by mouth daily., Disp: , Rfl:      simvastatin (ZOCOR) 40 MG tablet, TAKE 1 TABLET BY MOUTH EVERY DAY, Disp: 90 tablet, Rfl: 3    SOCIAL HABITS:    Social History     Tobacco Use   Smoking Status Former Smoker     Packs/day: 1.00     Years: 39.00     Pack years: 39.00     Types: Cigarettes     Quit date: 2014     Years since quittin.0   Smokeless Tobacco Former User     Quit date: 2005       Social History     Substance and Sexual Activity   Alcohol Use Yes    Comment: occasional on weekends       Social History     Substance and Sexual Activity   Drug Use Yes     Types: Marijuana       FAMILY HISTORY:    Family History   Problem Relation Age of Onset     No Medical Problems Mother      Heart disease Father      Hypertension Father      Diabetes Sister      Heart disease Brother      Diabetes Brother      Heart attack Brother      Diabetes Sister        REVIEW OF SYSTEMS:    A 12 point ROS was reviewed and except for what is listed in the HPI above, all others are negative    PE:  BP (!) 170/100 (Patient Site: Left Arm, Patient Position: Sitting) Comment: manual bp. patient states he has  white coat syndrome  Pulse 76   Temp 98.2  F (36.8  C)   Wt Readings from Last 1 Encounters:   12/16/20 214 lb (97.1 kg)     There is no height or weight on file to calculate BMI.    EXAM:  GENERAL: This is a well-developed 66 y.o. male who appears his stated age  EYES: Grossly normal.  MOUTH: Buccal mucosa normal   MUSCULOSKELETAL: Grossly normal and both lower extremities are intact.  HEME/LYMPH: No lymphedema  NEUROLOGIC: Focally intact, Alert and oriented x 3.   PSYCH: appropriate affect  INTEGUMENT: No open lesions or ulcers        DIAGNOSTIC STUDIES:     Images:  Us Mack Doppler No Exercise, 1-2 Levels, Bilateral    Result Date: 6/3/2021  BILATERAL RESTING ANKLE-BRACHIAL INDICES (MACK'S) (06/03/21) Indication: SURVEILLANCE MACK'S: RIGHT SFA MID STENT (5/22/2020). PVD Previous: 9/9/2020 History: Previous Smoker, Hypertension, Hyperlipidemia, PAD, Angioplasty and Vascular Surgery  Resting MACK's          Right: mmHg Index     Brachial: 168  Ankle-(PT): 203 1.13 Ankle-(DP): 189 1.06           Digit: 134 0.75               Left: mmHg Index     Brachial: 179  Ankle-(PT): 183 1.02 Ankle-(DP): 161 0.90           Digit: 117 0.65 Resting ankle-brachial index of 1.13 on the right. Toe Pressures of 134 mmHg and TBI of 0.75  Resting ankle-brachial index of 1.02 on the left. Toe Pressures of 117 mmHg and TBI of 0.65  WAVEFORMS: The right dorsalis pedis and posterior tibial arteries show biphasic waveforms. The left dorsalis pedis and posterior tibial arteries show biphasic waveforms. Comments:  Impression:  1. RIGHT LOWER EXTREMITY: MACK is Normal with an MACK of 1.13. and Normal toe pressures of 134 mmHg. 2. LEFT LOWER EXTREMITY: MACK is Normal with an MACK of 1.02. and Normal toe pressures of 117 mmHg. Reference: Wound classification Grade MACK Ankle Systolic Pressure Toe Pressures 0 > 0.80 > 100 mmHg > 60 mmHg 1 0.6 - 0.79 70 - 100 mmHg 40 - 59 mmHg 2 0.4 - 0.59 50-70 mmHg 30 - 39 mmHg 3 < 0.39 < 50 mmHg < 30 mmHg Digit  Pressures DBI Disease Category > 0.70 Normal < 0.70 Abnormal > 30 mmHg Potential wound healing < 30 mmHg Impaired wound healing Ankle Brachial Pressures MCAK Disease Category > 1.3  Likely vessel calcification with monophasic waveforms, non-diagnostic 0.95-1.30 Normal with multiphasic waveforms 0.50-0.95 Single level disease 0.30-0.50 Multilevel disease < 0.30 Critical limb ischema Volume Plethysmography Recording (VPR) at all levels Normal Sharp systolic peak, fast upstroke, prominent dicrotic notch in wave Mild Sharp systolic peak, fast upstroke, absent dicrotic notch in wave Moderate Flattened systolic peak, slowed upstroke, absent dicrotic notch in wave Severe Low-amplitude wave with = upslope and down slope Occluded Flat Line Multiple Level Segmental Pressures  Normal Abnormal Upper Thigh > 1.2 pressure indices, <30 mmHg between lateral and horizontal cuffs < 0.8 pressure indices suggest proximal occlusion, 0.8-1.2 pressure indices suggest inflow disease, > 30 mmHg between lateral and horizontal cuffs  Lower Thigh < 30 mmHg between lateral and horizontal cuffs > 30 mmHg between lateral and horizontal cuffs Upper Calf < 30 mmHg between lateral and horizontal cuffs > 30 mmHg between lateral and horizontal cuffs Note: As limb diameter increases, pressure measurements also increase.    Us Arterial Leg Right    Result Date: 6/3/2021  Arterial Duplex Ultrasound (06/03/21) Lower Extremity Artery Evaluation Indication: SURVEILLANCE: RIGHT SFA MID STENT (5/22/2020). PVD  Previous: Angioplasty Date: 9/9/2020 History: Previous Smoker, Hypertension, Hyperlipidemia, PAD, Angioplasty and Vascular Surgery  Technique: Duplex imaging is performed utilizing gray-scale, two-dimensional images, and color-flow imaging. Doppler waveform analysis and spectral Doppler imaging is also performed.  LOWER EXTREMITY ARTERIAL DUPLEX EXAM WITH WAVEFORMS  Right Leg:(cm/s) Location Velocities Waveforms EIA   163  T CFA   171  T PFA   217  T SFA  Proximal   134/206  T SFA Mid   177 T SFA Distal   172  B Popliteal Artery   70/81  T PTA   100   T DAVIS   47  B DPA   37  B Waveforms: T=Triphasic, M=Monophasic, B=Biphasic Left Leg:(cm/s) Location: Velocities Waveforms PTA:   75   B DAVIS: (RETROGRADE)    50  B DPA: (RETOGRADE)    45  B Waveforms: T=Triphasic, M=Monophasic, B=Biphasic Stent Velocities: Stent 1: Right Leg Stent location: SFA MID      Velocity    Waveform Inflow Artery: SFA PROX       Proximal to Stent:   206   T Proximal Stent:   191  T Mid Stent:   177  T Distal Stent:   213  T Distal to Stent:   172  B Outflow Artery: SFA DISTAL       Comments:  Impression: Right Lower Extremity: Normal arterial flow right lower extremity, patent stents, no hemodynamically significant stenosis, multiphasic flow Left Lower Extremity: Normal left lower extremity flow Reference: Category Normal 1-19% 20-49% 50-99% Occluded PSV <160 cm/sec without spectral broadening <160 cm/sec with spectral broadening Increased Increased Absent Flow Ratio N/A N/A < 2.0 >2.0 N/A Post-Stenotic Turbulence No No No Yes N/A       I personally reviewed the images and my interpretation is his ABIs are normal bilaterally.  Right leg arterial duplex shows no restenosis of the SFA..    LABS:      Sodium   Date Value Ref Range Status   08/25/2020 143 136 - 145 mmol/L Final   06/18/2019 142 136 - 145 mmol/L Final   12/05/2018 139 136 - 145 mmol/L Final     Potassium   Date Value Ref Range Status   08/25/2020 4.8 3.5 - 5.0 mmol/L Final   05/22/2020 4.3 3.5 - 5.0 mmol/L Final   08/23/2019 4.4 3.5 - 5.0 mmol/L Final     Chloride   Date Value Ref Range Status   08/25/2020 108 (H) 98 - 107 mmol/L Final   06/18/2019 107 98 - 107 mmol/L Final   12/05/2018 103 98 - 107 mmol/L Final     BUN   Date Value Ref Range Status   08/25/2020 18 8 - 22 mg/dL Final   06/18/2019 16 8 - 22 mg/dL Final   12/05/2018 20 8 - 22 mg/dL Final     Creatinine   Date Value Ref Range Status   08/25/2020 0.96 0.70 - 1.30 mg/dL  Final   05/22/2020 0.87 0.70 - 1.30 mg/dL Final   08/23/2019 0.97 0.70 - 1.30 mg/dL Final     Hemoglobin   Date Value Ref Range Status   05/22/2020 14.4 14.0 - 18.0 g/dL Final   08/23/2019 15.6 14.0 - 18.0 g/dL Final   08/13/2019 15.4 14.0 - 18.0 g/dL Final     Platelets   Date Value Ref Range Status   05/22/2020 154 140 - 440 thou/uL Final   08/23/2019 161 140 - 440 thou/uL Final   08/13/2019 150 140 - 440 thou/uL Final     INR   Date Value Ref Range Status   05/22/2020 1.01 0.90 - 1.10 Final   08/23/2019 1.00 0.90 - 1.10 Final   03/30/2016 1.00 0.90 - 1.10 Final       30 minutes spent on the day of encounter doing chart review, history and exam, documentation, and further activities as noted.     Adry Mandel MD  VASCULAR SURGERY

## 2021-06-25 NOTE — TELEPHONE ENCOUNTER
Medication Question or Clarification  Who is calling: Pharmacy: [facility]  What medication are you calling about? (include dose and sig)   indomethacin (INDOCIN) 50 MG capsule 30 capsule 0 3/19/2019     Sig - Route: Take 1 capsule (50 mg total) by mouth 3 (three) times a day with meals. - Oral    Sent to pharmacy as: indomethacin (INDOCIN) 50 MG capsule        Who prescribed the medication?: Elmo Crump MD  What is your question/concern?: This medication is being clarified by pharmacist due to age of 64. There is a contradiction due to increase side effects of bleeding causing ulcers and other conditions that the pharmacist would like to make Elmo Crump MD aware of. Please return call to pharmacist  Pharmacy: [facility]  Okay to leave a detailed message?: Yes  Site CMT - Please call the pharmacy to obtain any additional needed information.

## 2021-06-25 NOTE — PROGRESS NOTES
[facility]      Patient is in clinic today to see MD Adry Mandel.      Patient is here for a 6month follow up to discuss PAD. Previous right leg angiointervention. On ASA, Pletal, statin.     Vitals:    06/03/21 0851   BP: (!) 170/100   Pulse: 76   Temp: 98.2  F (36.8  C)       The provider has been notified. Pt bp is high. Pt states he has white coat syndrome.     Questions patient would like addressed today are:   1. Patient is not used to taking medications two times a day and he was wondering if he can take it once per day or 2 pills at one time. He is missing 2nd dose frequently due to forgetting to take it.  (taking 100mg two times a day)  2. Patient is having discomfort in left calf when walking uphill, wondering what the status is on the arteries on that side with hx of angioplasty.   3. Has concerns if right carotid is ok being that left side is occluded.     Refills are needed: No        Jonathan Morris RN    After the patient had left clinic, the ultrasound was read by Dr. Ann and noted irregular heart rhythm. Pt has no history of Afib. Per Dr Mandel will have patient follow up with pcp on this and also on his high blood pressure. Called patient to make an appt with his pcp.

## 2021-06-26 ENCOUNTER — HEALTH MAINTENANCE LETTER (OUTPATIENT)
Age: 66
End: 2021-06-26

## 2021-06-26 NOTE — PROGRESS NOTES
Progress Notes by Lindy Hayes MD at 11/3/2018 10:40 AM     Author: Lindy Hayes MD Service: -- Author Type: Physician    Filed: 11/3/2018  1:14 PM Encounter Date: 11/3/2018 Status: Signed    : Lindy Hayes MD (Physician)         Subjective:   Dung Negrete is a 63 y.o. male  Roomed by: MZ    Refills needed? No    Do you have any forms that need to be filled out? No      Chief Complaint   Patient presents with   ? Sore Throat     x 1 week, not sleeping well   Sore throat started on 10/27. Says having a lot of phlegm and coughing some. Denies CP or shortness of breath, but admits fevers and chills. Admits that patient has been eating, drinking and urinating normally.  Denies any recent belly pain, vomiting or diarrhea. Energy level has gotten worse because of his chest congestion. Says he stopped smoking cigarettes about 3 years ago. Smoked for about 25 years.     Review of Systems  See HPI for ROS, otherwise balance of other systems negative    No Known Allergies    Current Outpatient Prescriptions:   ?  aspirin 81 MG EC tablet, Take 81 mg by mouth daily., Disp: , Rfl:   ?  cholecalciferol, vitamin D3, (VITAMIN D3) 400 unit cap, Take 1 capsule by mouth daily., Disp: , Rfl:   ?  lansoprazole (PREVACID) 30 MG capsule, TAKE 1 CAPSULE (30 MG TOTAL) BY MOUTH DAILY., Disp: 90 capsule, Rfl: 1  ?  MULTIVITAMIN ORAL, Take 1 tablet by mouth daily., Disp: , Rfl:   ?  simvastatin (ZOCOR) 40 MG tablet, Take 1 tablet (40 mg total) by mouth daily., Disp: 90 tablet, Rfl: 2  ?  loratadine 5 mg TbDL, Take 5 mg by mouth daily., Disp: , Rfl:   Patient Active Problem List   Diagnosis   ? Mixed hyperlipidemia   ? Gout   ? Neck Sprain   ? Dyspnea   ? GERD (gastroesophageal reflux disease)   ? Polyarthralgia   ? Primary osteoarthritis involving multiple joints     No past medical history on file. - if none on file, see Problem List    Objective:     Vitals:    11/03/18 1104   BP: 141/73   Patient Site: Right Arm    Patient Position: Sitting   Cuff Size: Adult Large   Pulse: 88   Resp: 18   Temp: 99  F (37.2  C)   TempSrc: Oral   SpO2: 98%   Weight: 215 lb (97.5 kg)   Gen - Pt in NAD  Eyes - Conjunctiva non injected, no drainage  Face - non TTP over frontal sinus areas; non TTP over maxillary areas  Ears - external canals - no induration, Right TM - not injected, Left TM - not injected   Nose - not congested, no nasal drainage  Pharynx - not injected, tonsils 1+ size  Neck - supple, no cervical adenopathy, no masses  Cor - RRR w/o murmur  Lungs - Good air entry; no wheezes or crackles noted on auscultation - sporadic coarse coughing noted  Skin - no lesions, no rashes noted      Xr Chest 2 Views    Result Date: 11/3/2018  EXAM DATE:         11/03/2018 [facility] X-RAY CHEST, 2 VIEWS, FRONTAL AND LATERAL 11/3/2018 12:00 PM INDICATION: Worsening coughing up phlegm for last week. COMPARISON: None available. FINDINGS: Negative chest.    Radiologist's report discussed with patient on day of visit.       Assessment - Plan   Medical Decision Making -Though the patient's symptoms of cough are less than 10 days, because of his history of cigarette smoking will consider this an acute exacerbation of chronic bronchitis.  Chest x-ray was negative for pneumonia, pneumothorax or pleural effusion.    1. Acute exacerbation of chronic bronchitis (H)  - XR Chest 2 Views; Future  - XR Chest 2 Views  - doxycycline (MONODOX) 100 MG capsule; Take 1 capsule (100 mg total) by mouth 2 (two) times a day for 7 days.  Dispense: 14 capsule; Refill: 0  - codeine-guaiFENesin (GUAIFENESIN AC)  mg/5 mL liquid; Take 5-10 mL by mouth every 6 (six) hours as needed.  Dispense: 120 mL; Refill: 0    2. Throat pain  - Rapid Strep A Screen-Throat  - Group A Strep, RNA Direct Detection, Throat    At the conclusion of the encounter, assessment and plan were discussed.   All questions were answered.   The patient or guardian acknowledged  understanding and was involved in the decision making regarding the overall care plan.    Patient Instructions   1. Keep well hydrated  2. May alternate Tylenol every 6 hours with ibuprofen every 6 hours as needed for fever or pain  3. If symptoms not improved after completing antibiotics, follow up with primary  4. If you have any questions, call the clinic number - it's answered 24/7  - You will be contacted within the next 48 hours ONLY if the confirmatory strep test is positive.   - Additional antibiotics will be prescribed if the confirmatory strep was positive  - No sharing of food or beverage, until 48 hours is past     Bronchitis, Antibiotic Treatment (Adult)    Bronchitis is an infection of the air passages (bronchial tubes) in your lungs. It often occurs when you have a cold. This illness is contagious during the first few days and is spread through the air by coughing and sneezing, or by direct contact (touching the sick person and then touching your own eyes, nose, or mouth).  Symptoms of bronchitis include cough with mucus (phlegm) and low-grade fever. Bronchitis usually lasts 7 to 14 days. Mild cases can be treated with simple home remedies. More severe infection is treated with an antibiotic.  Home care  Follow these guidelines when caring for yourself at home:    If your symptoms are severe, rest at home for the first 2 to 3 days. When you go back to your usual activities, don't let yourself get too tired.    Do not smoke. Also avoid being exposed to secondhand smoke.    You may use over-the-counter medicines to control fever or pain, unless another medicine was prescribed. If you have chronic liver or kidney disease or have ever had a stomach ulcer or gastrointestinal bleeding, talk with your healthcare provider before using these medicines. Also talk to your provider if you are taking medicine to prevent blood clots. Aspirin should never be given to anyone younger than 18 years of age who is ill  with a viral infection or fever. It may cause severe liver or brain damage.    Your appetite may be poor, so a light diet is fine. Avoid dehydration by drinking 6 to 8 glasses of fluids per day (such as water, soft drinks, sports drinks, juices, tea, or soup). Extra fluids will help loosen secretions in the nose and lungs.    Over-the-counter cough, cold, and sore-throat medicines will not shorten the length of the illness, but they may be helpful to reduce symptoms. (Note: Do not use decongestants if you have high blood pressure.)    Finish all antibiotic medicine. Do this even if you are feeling better after only a few days.  Follow-up care  Follow up with your healthcare provider, or as advised. If you had an X-ray or ECG (electrocardiogram), a specialist will review it. You will be notified of any new findings that may affect your care.  If you are age 65 or older, or if you have a chronic lung disease or condition that affects your immune system, or you smoke, ask your healthcare provider about getting a pneumococcal vaccine and a yearly flu shot (influenza vaccine).  When to seek medical advice  Call your healthcare provider right away if any of these occur:    Fever of 100.4 F (38 C) or higher, or as directed by your healthcare provider    Coughing up increased amounts of colored sputum    Weakness, drowsiness, headache, facial pain, ear pain, or a stiff neck  Call 911  Call 911 if any of these occur.    Coughing up blood    Worsening weakness, drowsiness, headache, or stiff neck    Trouble breathing, wheezing, or pain with breathing  Date Last Reviewed: 9/13/2015 2000-2017 [employer]. [address] [place] [address], [place] [address]. All rights reserved. This information is not intended as a substitute for professional medical care. Always follow your healthcare professional's instructions.

## 2021-06-26 NOTE — PROGRESS NOTES
Patient ID: Dung Negrete is a 66 y.o. male.  /82   Pulse 81   Wt 210 lb 4.8 oz (95.4 kg)   SpO2 97%   BMI 29.33 kg/m      Assessment/Plan:                Diagnoses and all orders for this visit:    Essential hypertension  -     amLODIPine (NORVASC) 5 MG tablet; Take 1 tablet (5 mg total) by mouth daily.  Dispense: 30 tablet; Refill: 11    Irregular heart beat  -     Electrocardiogram Perform and Read    Erectile dysfunction, unspecified erectile dysfunction type  -     sildenafiL (VIAGRA) 50 MG tablet; Take 1 tablet (50 mg total) by mouth as needed for erectile dysfunction.  Dispense: 30 tablet; Refill: 1    Mixed hyperlipidemia  -     rosuvastatin (CRESTOR) 40 MG tablet; Take 1 tablet (40 mg total) by mouth at bedtime.  Dispense: 90 tablet; Refill: 3    Other orders  -     Cancel: Pneumococcal polysaccharide vaccine 23-valent 3 yo or older, subq/IM      DISCUSSION  Add amlodipine 5 mg once daily to gain better control of blood pressure.  Given potential for interaction with amlodipine and simvastatin will change him to rosuvastatin, given his tolerance of the simvastatin at 40 will start rosuvastatin 40 mg daily.    Based on my assessment no concern for arrhythmia on evaluation today.  Discussed continue to monitor for any potential symptoms of arrhythmia.  If there are further concerns we will consider further evaluation such as Holter monitor or event monitor.    Trial of sildenafil 50 mg for treatment of erectile dysfunction.  Subjective:     HPI    Dung Negrete is a 66 y.o. male is here today to follow-up on hypertension, irregular heartbeat noticed on recent evaluation in vascular clinic and to discuss erectile dysfunction.  His medical history also includes gout no recent concern, osteoarthritis, peripheral vascular disease and lumbar radiculopathy.    Lisinopril was increased at last encounter to gain better control blood pressure, unfortunate blood pressure still too high.  Discussed  addition of amlodipine 5 mg daily for improved blood pressure control.  Recommend follow-up nurse visit in 2 weeks to reassess blood pressure.    Irregular heartbeat noticed at recent vascular clinic evaluation.  Uncertain if this was actually rhythm tracing or some other means of noticing irregularity.  Patient has no cardiac symptoms of concern he has not noticed irregular heartbeat, palpitations racing heart or other similar concerns.  He has no history of arrhythmia.    Reports erectile dysfunction.  Likely to be multifactorial.  Would like to try medication, no contraindication identified discussed.      Review of Systems  Complete review of systems is obtained.  Other than the specific considerations noted above complete review of systems is negative.        Objective:   Medications:  Current Outpatient Medications   Medication Sig     allopurinoL (ZYLOPRIM) 300 MG tablet Take 1 tablet (300 mg total) by mouth daily.     aspirin 81 MG EC tablet Take 81 mg by mouth daily.     celecoxib (CELEBREX) 100 MG capsule TAKE 1 CAPSULE BY MOUTH EVERY DAY     cholecalciferol, vitamin D3, (VITAMIN D3) 400 unit cap Take 1 capsule by mouth daily.     cilostazoL (PLETAL) 100 MG tablet Take 1 tablet (100 mg total) by mouth 2 (two) times a day.     lansoprazole (PREVACID) 30 MG capsule TAKE 1 CAPSULE BY MOUTH EVERY DAY     lisinopriL (PRINIVIL,ZESTRIL) 20 MG tablet Take 1 tablet (20 mg total) by mouth daily.     MULTIVITAMIN ORAL Take 1 tablet by mouth daily.     simvastatin (ZOCOR) 40 MG tablet TAKE 1 TABLET BY MOUTH EVERY DAY     amLODIPine (NORVASC) 5 MG tablet Take 1 tablet (5 mg total) by mouth daily.     rosuvastatin (CRESTOR) 40 MG tablet Take 1 tablet (40 mg total) by mouth at bedtime.     sildenafiL (VIAGRA) 50 MG tablet Take 1 tablet (50 mg total) by mouth as needed for erectile dysfunction.     Allergies:  No Known Allergies    Tobacco:   reports that he quit smoking about 7 years ago. His smoking use included  cigarettes. He has a 39.00 pack-year smoking history. He quit smokeless tobacco use about 16 years ago.     Physical Exam      /82   Pulse 81   Wt 210 lb 4.8 oz (95.4 kg)   SpO2 97%   BMI 29.33 kg/m      General Appearance:    Alert, cooperative, no distress   Eyes:   No scleral icterus or conjunctival irritation       Ears:    Normal TM's and external ear canals, both ears   Throat:   Lips, mucosa, and tongue normal; teeth and gums normal   Neck:   Supple, symmetrical, trachea midline, no adenopathy;        thyroid:  No enlargement/tenderness/nodules   Lungs:     Clear to auscultation bilaterally, respirations unlabored, no wheezes or crackles   Heart:    Regular rate and rhythm,  No murmur   Extremities:  No edema, no joint swelling or redness, no evidence of any injuries   Skin:  No concerning skin findings, no suspicious moles, no rashes   Neurologic:  On gross examination there is no motor or sensory deficit.  Patient walks with a normal gait

## 2021-06-28 NOTE — PROGRESS NOTES
Progress Notes by Natalio Longoria MD at 3/4/2020  2:00 PM     Author: Natalio Longoria MD Service: -- Author Type: Resident    Filed: 3/4/2020  4:07 PM Encounter Date: 3/4/2020 Status: Attested    : Natalio Longoria MD (Resident) Cosigner: Adry Mandel MD at 3/9/2020  8:13 AM    Attestation signed by Adry Mandel MD at 3/9/2020  8:13 AM      I have seen and assessed this patient and agree with the attached note.                 [facility]PAT[facility]   VASCULAR SURGEON: Adry Mandel MD    [facility]  [facility]    Dung Negrete   Medical Record #:  [id number]  YOB: 1955  Age:  65 y.o.     Date of Service: 3/4/2020    PRIMARY CARE PROVIDER: Elmo Crump MD    Reason for consultation:  PAD    IMPRESSION:  Patient clinically doing well status post prior right SFA angioplasty with no recurrence of his claudication symptoms.  No wounds on his LE.   Patient is on dual antiplatelet therapy and statin.  Does not smoke.  Drug-eluting balloon therapy was not used for this prior angioplasty as this had been taken off her inventory at that time due to FDA concerns.  Of note on surveillance ABIs and Arterial Duplex there is concern today for mid SFA narrowing or stenosis with velocities elevated to above 300 cm/sec.  Concern for greater than 70% narrowing.    RECOMMENDATION:  We recommend angiogram with intervention for likely mid SFA stenosis on the right LE.  This should take place in the next 2-4 weeks potentially.  Since in the past, we did not use drug coated balloon technology, we can potentially use this for intervention on this upcoming angiogram.    HPI:  Dung Negrete is a 65 y.o. male who was seen today in consultation for ongoing surveillance for PAD with prior right LE angio intervention.  Patient clinically doing well status post prior right SFA angioplasty with no recurrence of his claudication symptoms.  No wounds on his LE.   Patient is on dual  antiplatelet therapy and statin.  Does not smoke.  No current complaints but surveillance arterial duplex suggest elevated velocities and likely mid SFA stenosis.  Taking Zocor for cholesterol medication.    PHH:    Past Medical History:   Diagnosis Date   ? Cellulitis and abscess of leg 8/31/2006   ? Dyspnea 5/27/2015   ? Enthesopathy of knee 8/31/2006   ? GERD (gastroesophageal reflux disease) 1/8/2017   ? Health examination of defined subpopulation 12/27/2005   ? History of total hip arthroplasty, left 12/28/2018   ? Mixed hyperlipidemia     Created by Conversion    ? Neck Sprain     Created by Conversion    ? Polyarthralgia 5/17/2017   ? Primary osteoarthritis involving multiple joints 5/17/2017        Past Surgical History:   Procedure Laterality Date   ? ANGIOPLASTY Left 2007   ? IR EXTREMITY ANGIOGRAM RIGHT  8/23/2019   ? TOTAL HIP ARTHROPLASTY Left 12/28/2019   ? TOTAL KNEE ARTHROPLASTY Right 04/13/2016       ALLERGIES:  Patient has no known allergies.    MEDS:    Current Outpatient Medications:   ?  aspirin 81 MG EC tablet, Take 81 mg by mouth daily., Disp: , Rfl:   ?  celecoxib (CELEBREX) 100 MG capsule, TAKE 1 CAPSULE BY MOUTH EVERY DAY, Disp: 90 capsule, Rfl: 3  ?  cholecalciferol, vitamin D3, (VITAMIN D3) 400 unit cap, Take 1 capsule by mouth daily., Disp: , Rfl:   ?  clopidogrel (PLAVIX) 75 mg tablet, Take 1 tablet (75 mg total) by mouth daily., Disp: 90 tablet, Rfl: 0  ?  lansoprazole (PREVACID) 30 MG capsule, TAKE 1 CAPSULE BY MOUTH EVERY DAY, Disp: 90 capsule, Rfl: 2  ?  MULTIVITAMIN ORAL, Take 1 tablet by mouth daily., Disp: , Rfl:   ?  simvastatin (ZOCOR) 40 MG tablet, Take 1 tablet (40 mg total) by mouth daily., Disp: 90 tablet, Rfl: 3  ?  indomethacin (INDOCIN) 50 MG capsule, Take 1 capsule (50 mg total) by mouth 3 (three) times a day with meals., Disp: 30 capsule, Rfl: 0  ?  loratadine 5 mg TbDL, Take 5 mg by mouth daily., Disp: , Rfl:     SOCIAL HABITS:    Social History     Tobacco Use    Smoking Status Former Smoker   ? Packs/day: 1.00   ? Years: 39.00   ? Pack years: 39.00   ? Types: Cigarettes   ? Last attempt to quit: 2014   ? Years since quittin.7   Smokeless Tobacco Former User   ? Quit date: 2005       Social History     Substance and Sexual Activity   Alcohol Use Yes    Comment: occasional on weekends       Social History     Substance and Sexual Activity   Drug Use Yes   ? Types: Marijuana       FAMILY HISTORY:    Family History   Problem Relation Age of Onset   ? No Medical Problems Mother    ? Heart disease Father    ? Hypertension Father    ? Diabetes Sister    ? Heart disease Brother    ? Diabetes Brother    ? Heart attack Brother    ? Diabetes Sister        REVIEW OF SYSTEMS:    A 12 point ROS was reviewed and except for what is listed in the HPI above, all others are negative    PE:  /80   Pulse 84   Resp 14   Wt Readings from Last 1 Encounters:   20 221 lb (100.2 kg)     There is no height or weight on file to calculate BMI.    EXAM:  GENERAL: This is a well-developed 65 y.o. male who appears his stated age  EYES: Grossly normal.  MOUTH: Buccal mucosa normal   CARDIAC: No murmurs.  Not tachycardic.  CHEST/LUNG:  Good bilateral air movement, clear breath sounds  GASTROINTESINAL (ABDOMEN): Soft, mildly distended, no TTP  MUSCULOSKELETAL: Grossly normal and both lower extremities are intact.  NEUROLOGIC: Focally intact, Alert and oriented x 3.   PSYCH: appropriate affect  INTEGUMENT: No open lesions or ulcers  Pulse Exam:       DIAGNOSTIC STUDIES:     Images:  surveillance ABIs and Arterial Duplex there is concern today for mid SFA narrowing or stenosis with velocities elevated to above 300 cm/sec.  Concern for greater than 70% narrowing.  ABIs are above 0.9 bilaterally.  Toe pressures are normal and above 80 mm Hg bilaterally.      I personally reviewed the images and my interpretation is as stated above.    LABS:      Sodium   Date Value Ref Range Status    06/18/2019 142 136 - 145 mmol/L Final   12/05/2018 139 136 - 145 mmol/L Final   02/14/2018 140 136 - 145 mmol/L Final     Potassium   Date Value Ref Range Status   08/23/2019 4.4 3.5 - 5.0 mmol/L Final   06/18/2019 4.4 3.5 - 5.0 mmol/L Final   12/05/2018 4.4 3.5 - 5.0 mmol/L Final     Chloride   Date Value Ref Range Status   06/18/2019 107 98 - 107 mmol/L Final   12/05/2018 103 98 - 107 mmol/L Final   02/14/2018 104 98 - 107 mmol/L Final     BUN   Date Value Ref Range Status   06/18/2019 16 8 - 22 mg/dL Final   12/05/2018 20 8 - 22 mg/dL Final   02/14/2018 20 8 - 22 mg/dL Final     Creatinine   Date Value Ref Range Status   08/23/2019 0.97 0.70 - 1.30 mg/dL Final   06/18/2019 0.85 0.70 - 1.30 mg/dL Final   12/05/2018 0.87 0.70 - 1.30 mg/dL Final     Hemoglobin   Date Value Ref Range Status   08/23/2019 15.6 14.0 - 18.0 g/dL Final   08/13/2019 15.4 14.0 - 18.0 g/dL Final   12/05/2018 14.5 14.0 - 18.0 g/dL Final     Platelets   Date Value Ref Range Status   08/23/2019 161 140 - 440 thou/uL Final   08/13/2019 150 140 - 440 thou/uL Final   12/05/2018 181 140 - 440 thou/uL Final     INR   Date Value Ref Range Status   08/23/2019 1.00 0.90 - 1.10 Final   03/30/2016 1.00 0.90 - 1.10 Final       Natalio Longoria MD  VASCULAR SURGERY

## 2021-07-02 NOTE — H&P
H&P by Natalio Longoria MD at 5/22/2020  8:00 AM     Author: Natalio Longoria MD Service: Vascular Surgery Author Type: Resident    Filed: 5/22/2020  7:43 AM Date of Service: 5/22/2020  8:00 AM Status: Attested    : Natalio Longoria MD (Resident) Cosigner: Adry Mandel MD at 5/27/2020  4:05 PM    Attestation signed by Adry Mandel MD at 5/27/2020  4:05 PM    Seen and assessed this patient and agree with the above note                 VASCULAR SURGERY PRE-OP H&P for angiogram with likely intervention   VASCULAR SURGEON: Adry Mandel MD    LOCATION:  [facility]    Dung Negrete   Medical Record #:  [id number]  YOB: 1955  Age:  65 y.o.     Date of Service: 5/22/2020    PRIMARY CARE PROVIDER: Elmo Crump MD    Reason for consultation: Follow-up of peripheral vascular disease     IMPRESSION: Patient has had right SFA simple balloon angioplasty by Dr. Mandel for very short distance claudication and was much improved previously. Returned with mild recurrence of claudication and significantly stenosed SFA to greater than 70% on duplex imaging.  Scheduled for repeat angiogram and intervention to prevent occlusion.  This was canceled due to covid-19.  Symptoms largely stable although in the last 2 weeks has been having some numbness in his foot.  May have had covid-19, as he had a bad cough in early March as did his wife.     RECOMMENDATION: Plan today for right leg angiointervention with possible use of drug-eluting balloon or stent.  COVID 19 testing is negative.     HPI:  Dung Negrete is a 65 y.o. male who was seen in Dr. Mandel's clinic for follow-up for his peripheral vascular disease.  This is a patient who we treated with simple balloon angioplasty of the SFA for very short distance claudication of his right leg.  This was in August 2019.  Dramatic improvement immediately.  Has been doing well.  On surveillance, however, we noted development of restenosis early on at [facility]  [place].  Plan for Angiointervention which was canceled due to covid-19.  In the intervening time he has been doing quite well.  Still has some degree of claudication.       In the last 2 weeks he developed some numbness in his right foot without any skin changes.  Uncertain what the cause is.  Not like his symptoms of gout, which he also suffers from.  His primary care doctor gave him 5 days of steroids for this but it has not helped.  Patient used to take indomethacin for his gout but his primary care provider recommended he not take it given that he is on aspirin and Plavix.      No other new health issues.  No chest pains or shortness of breath currently.  No new urinary or GI complaints.  Would like to forward with his angio-intervention today.    No recent cough or fevers.    PHH:    Past Medical History:   Diagnosis Date   ? Cellulitis and abscess of leg 8/31/2006   ? Dyspnea 5/27/2015   ? Enthesopathy of knee 8/31/2006   ? GERD (gastroesophageal reflux disease) 1/8/2017   ? Health examination of defined subpopulation 12/27/2005   ? History of total hip arthroplasty, left 12/28/2018   ? Mixed hyperlipidemia     Created by Conversion    ? Neck Sprain     Created by Conversion    ? Polyarthralgia 5/17/2017   ? Primary osteoarthritis involving multiple joints 5/17/2017        Past Surgical History:   Procedure Laterality Date   ? ANGIOPLASTY Left 2007   ? IR EXTREMITY ANGIOGRAM RIGHT  8/23/2019   ? TOTAL HIP ARTHROPLASTY Left 12/28/2019   ? TOTAL KNEE ARTHROPLASTY Right 04/13/2016       ALLERGIES:  Patient has no known allergies.    MEDS:    Current Outpatient Medications:   ?  aspirin 81 MG EC tablet, Take 81 mg by mouth daily., Disp: , Rfl:   ?  celecoxib (CELEBREX) 100 MG capsule, TAKE 1 CAPSULE BY MOUTH EVERY DAY, Disp: 90 capsule, Rfl: 3  ?  cholecalciferol, vitamin D3, (VITAMIN D3) 400 unit cap, Take 1 capsule by mouth daily., Disp: , Rfl:   ?  clopidogrel (PLAVIX) 75 mg tablet, Take 1 tablet (75 mg total)  by mouth daily., Disp: 90 tablet, Rfl: 0  ?  lansoprazole (PREVACID) 30 MG capsule, TAKE 1 CAPSULE BY MOUTH EVERY DAY, Disp: 90 capsule, Rfl: 2  ?  loratadine 5 mg TbDL, Take 5 mg by mouth daily., Disp: , Rfl:   ?  MULTIVITAMIN ORAL, Take 1 tablet by mouth daily., Disp: , Rfl:   ?  simvastatin (ZOCOR) 40 MG tablet, Take 1 tablet (40 mg total) by mouth daily., Disp: 90 tablet, Rfl: 3    Current Facility-Administered Medications:   ?  sodium chloride 0.9%, 25 mL/hr, Intravenous, Continuous, Adry Mandel MD  ?  sodium chloride bacteriostatic 0.9 % injection 0.1-0.3 mL, 0.1-0.3 mL, Subcutaneous, PRN, Adry Mandel MD  ?  sodium chloride flush 3 mL (NS), 3 mL, Intravenous, Line Care, Adry Mandel MD    SOCIAL HABITS:    Social History     Tobacco Use   Smoking Status Former Smoker   ? Packs/day: 1.00   ? Years: 39.00   ? Pack years: 39.00   ? Types: Cigarettes   ? Last attempt to quit: 2014   ? Years since quittin.9   Smokeless Tobacco Former User   ? Quit date: 2005       Social History     Substance and Sexual Activity   Alcohol Use Yes    Comment: occasional on weekends       Social History     Substance and Sexual Activity   Drug Use Yes   ? Types: Marijuana       FAMILY HISTORY:    Family History   Problem Relation Age of Onset   ? No Medical Problems Mother    ? Heart disease Father    ? Hypertension Father    ? Diabetes Sister    ? Heart disease Brother    ? Diabetes Brother    ? Heart attack Brother    ? Diabetes Sister        REVIEW OF SYSTEMS:    A 12 point ROS was reviewed and except for what is listed in the HPI above, all others are negative    PE:  There were no vitals taken for this visit.  Wt Readings from Last 1 Encounters:   20 212 lb (96.2 kg)     There is no height or weight on file to calculate BMI.    EXAM:  GENERAL: This is a well-developed 65 y.o. male who appears his stated age  EYES: Grossly normal.  MOUTH: Buccal mucosa normal   CARDIAC: Reg rate  CHEST/LUNG:  Good bilateral  air movement  GASTROINTESINAL (ABDOMEN): Soft, non-distended, no TTP  MUSCULOSKELETAL: Grossly normal and both lower extremities are intact.  Good 5/5 dorsi-flexion and plantar flexion  NEUROLOGIC: Focally intact, Alert and oriented x 3.   PSYCH: appropriate affect  INTEGUMENT: No open lesions or ulcers  Pulse Exam:   Palpable femoral pulses  DP with doppler signals bilaterally    DIAGNOSTIC STUDIES:     Images:  No new imaging studies to review    LABS:      Sodium   Date Value Ref Range Status   06/18/2019 142 136 - 145 mmol/L Final   12/05/2018 139 136 - 145 mmol/L Final   02/14/2018 140 136 - 145 mmol/L Final     Potassium   Date Value Ref Range Status   08/23/2019 4.4 3.5 - 5.0 mmol/L Final   06/18/2019 4.4 3.5 - 5.0 mmol/L Final   12/05/2018 4.4 3.5 - 5.0 mmol/L Final     Chloride   Date Value Ref Range Status   06/18/2019 107 98 - 107 mmol/L Final   12/05/2018 103 98 - 107 mmol/L Final   02/14/2018 104 98 - 107 mmol/L Final     BUN   Date Value Ref Range Status   06/18/2019 16 8 - 22 mg/dL Final   12/05/2018 20 8 - 22 mg/dL Final   02/14/2018 20 8 - 22 mg/dL Final     Creatinine   Date Value Ref Range Status   08/23/2019 0.97 0.70 - 1.30 mg/dL Final   06/18/2019 0.85 0.70 - 1.30 mg/dL Final   12/05/2018 0.87 0.70 - 1.30 mg/dL Final     Hemoglobin   Date Value Ref Range Status   08/23/2019 15.6 14.0 - 18.0 g/dL Final   08/13/2019 15.4 14.0 - 18.0 g/dL Final   12/05/2018 14.5 14.0 - 18.0 g/dL Final     Platelets   Date Value Ref Range Status   08/23/2019 161 140 - 440 thou/uL Final   08/13/2019 150 140 - 440 thou/uL Final   12/05/2018 181 140 - 440 thou/uL Final     INR   Date Value Ref Range Status   08/23/2019 1.00 0.90 - 1.10 Final   03/30/2016 1.00 0.90 - 1.10 Final     Natalio Longoria MD  VASCULAR SURGERY

## 2021-07-04 NOTE — ADDENDUM NOTE
Addendum Note by Trinh Mccarthy RN at 6/3/2021  9:00 AM     Author: Trinh Mccarthy RN Service: -- Author Type: Registered Nurse    Filed: 6/3/2021 10:48 AM Encounter Date: 6/3/2021 Status: Signed    : Trinh Mccarthy RN (Registered Nurse)    Addended by: TRINH MCCARTHY on: 6/3/2021 10:48 AM        Modules accepted: Orders

## 2021-07-04 NOTE — PATIENT INSTRUCTIONS - HE
Patient Instructions by Trinh Macias RN at 6/3/2021  9:00 AM     Author: Trinh Macias RN Service: -- Author Type: Registered Nurse    Filed: 6/3/2021  9:10 AM Encounter Date: 6/3/2021 Status: Signed    : Trinh Macias RN (Registered Nurse)       Ankle-Brachial Index (MACK) or Physiologic Test    Description  An ankle-brachial index test is relatively pain free. Blood pressure cuffs of various sizes are placed on your thigh, calf, foot and toes.  Similar to having your blood pressure checked with an arm cuff, as the technician inflates the cuffs, they progressively tighten and are then quickly released.  You may feel some discomfort, but generally for less than 60 seconds for each measurement. You will be asked to remove your socks and shoes and possibly your pants or shorts. Gowns will be provided. It usually takes about 30-60 minutes.   Depending on the initial readings and patient symptoms, you may be asked to perform a light walk on a treadmill.  The technician will apply ultrasound gel, usually warmed for your comfort, to your ankles and wrists. Through the gel, the technician will use a small hand-held device that emits sound waves.  Risks  There are typically no side effects or complications associated with a physiologic study.  How to Prepare  Eat and take medications as usual.  There is no preparation required for an ankle-brachial index (MACK) or physiologic exam.  What Can I Expect After the Test?  The technician will send the ultrasound images to your vascular surgeon for evaluation. Typically, a report is available in 2-3 days. If anything critical is found, it is standard practice to notify the vascular surgeon immediately.  Reference: https://vascular.org/patient-resources/vascular-tests    Lower Extremity Arterial Ultrasound    Description  Ultrasound examinations are painless and easy for the patient. The vascular laboratory will contain a bed and just two or three pieces of equipment.  You will be asked to remove pants or shorts and gowns will be provided. It usually takes about 30 minutes.  The technician will tuck a towel under your underpants in the groin. The gel is water-soluble and will not stain your skin or clothes.  Ultrasound gel, usually warmed for your comfort, will be placed on the inner side of your legs.    Through the gel, the technician will apply to your legs a small hand-held device that emits sound waves.  When the test is completed, the technician will remove excess gel from your legs.    Risks  There are typically no side effects or complications associated with a lower extremity arterial duplex ultrasound.  How to Prepare  Eat and take medications as usual.  There is no preparation required for a lower extremity arterial duplex ultrasound.  What Can I Expect After the Test?  The technician will send the ultrasound images to your vascular surgeon for evaluation. Typically, a report is available in 2-3 days. If anything critical is found, it is standard practice to notify the vascular surgeon immediately.  Reference: https://vascular.org/patient-resources/vascular-tests  Carotid Duplex    Description  Wearing your street clothes, you will be asked to lie on a stretcher.    Ultrasound gel, usually warmed for your comfort, will be placed on either side of your neck.    Through the gel, the technician will apply to your neck a small hand-held device that emits sound waves.   When the test is completed, the technician will remove excess gel from your neck. The gel is water-soluble and will not stain your skin or clothes.  How to Prepare  Eat and take medications as usual.   Wear minimal or no jewelry to ease application and removal of gel.  Risks  There are typically no side effects or complications associated with a carotid duplex ultrasound examination.  What Can I Expect After the Test?  The technician will send the ultrasound images to your vascular surgeon for evaluation.  Typically, a report is available in 2-3 days. If anything critical is found, it is standard practice to notify the vascular surgeon immediately.  Reference: https://vascular.org/patient-resources/vascular-tests

## 2021-07-06 VITALS
DIASTOLIC BLOOD PRESSURE: 82 MMHG | SYSTOLIC BLOOD PRESSURE: 156 MMHG | WEIGHT: 210.3 LBS | HEART RATE: 81 BPM | BODY MASS INDEX: 29.33 KG/M2 | OXYGEN SATURATION: 97 %

## 2021-07-06 VITALS — TEMPERATURE: 98.2 F | SYSTOLIC BLOOD PRESSURE: 170 MMHG | HEART RATE: 76 BPM | DIASTOLIC BLOOD PRESSURE: 100 MMHG

## 2021-08-12 DIAGNOSIS — K21.9 GASTROESOPHAGEAL REFLUX DISEASE WITHOUT ESOPHAGITIS: ICD-10-CM

## 2021-08-12 RX ORDER — LANSOPRAZOLE 30 MG/1
CAPSULE, DELAYED RELEASE ORAL
Qty: 90 CAPSULE | Refills: 3 | Status: SHIPPED | OUTPATIENT
Start: 2021-08-12 | End: 2022-08-16

## 2021-09-03 PROBLEM — M10.9 GOUT: Status: ACTIVE | Noted: 2021-09-03

## 2021-09-07 ENCOUNTER — OFFICE VISIT (OUTPATIENT)
Dept: FAMILY MEDICINE | Facility: CLINIC | Age: 66
End: 2021-09-07
Payer: MEDICARE

## 2021-09-07 VITALS
SYSTOLIC BLOOD PRESSURE: 144 MMHG | HEART RATE: 79 BPM | HEIGHT: 71 IN | OXYGEN SATURATION: 98 % | DIASTOLIC BLOOD PRESSURE: 82 MMHG | WEIGHT: 214.2 LBS | BODY MASS INDEX: 29.99 KG/M2

## 2021-09-07 DIAGNOSIS — M15.0 PRIMARY OSTEOARTHRITIS INVOLVING MULTIPLE JOINTS: ICD-10-CM

## 2021-09-07 DIAGNOSIS — M54.16 LUMBAR RADICULOPATHY: ICD-10-CM

## 2021-09-07 DIAGNOSIS — K21.9 GASTROESOPHAGEAL REFLUX DISEASE WITHOUT ESOPHAGITIS: ICD-10-CM

## 2021-09-07 DIAGNOSIS — E78.5 HYPERLIPIDEMIA, UNSPECIFIED HYPERLIPIDEMIA TYPE: ICD-10-CM

## 2021-09-07 DIAGNOSIS — E78.2 MIXED HYPERLIPIDEMIA: Primary | ICD-10-CM

## 2021-09-07 DIAGNOSIS — M10.9 GOUTY ARTHROPATHY: ICD-10-CM

## 2021-09-07 DIAGNOSIS — I73.9 PVD (PERIPHERAL VASCULAR DISEASE) (H): ICD-10-CM

## 2021-09-07 DIAGNOSIS — I10 ESSENTIAL HYPERTENSION: ICD-10-CM

## 2021-09-07 LAB
CHOLEST SERPL-MCNC: 143 MG/DL
HDLC SERPL-MCNC: 53 MG/DL
LDLC SERPL CALC-MCNC: 56 MG/DL
TRIGL SERPL-MCNC: 168 MG/DL

## 2021-09-07 PROCEDURE — 80053 COMPREHEN METABOLIC PANEL: CPT | Performed by: FAMILY MEDICINE

## 2021-09-07 PROCEDURE — 99397 PER PM REEVAL EST PAT 65+ YR: CPT | Mod: 25 | Performed by: FAMILY MEDICINE

## 2021-09-07 PROCEDURE — 84550 ASSAY OF BLOOD/URIC ACID: CPT | Performed by: FAMILY MEDICINE

## 2021-09-07 PROCEDURE — 80061 LIPID PANEL: CPT | Performed by: FAMILY MEDICINE

## 2021-09-07 PROCEDURE — 90732 PPSV23 VACC 2 YRS+ SUBQ/IM: CPT | Performed by: FAMILY MEDICINE

## 2021-09-07 PROCEDURE — 90471 IMMUNIZATION ADMIN: CPT | Performed by: FAMILY MEDICINE

## 2021-09-07 PROCEDURE — 99213 OFFICE O/P EST LOW 20 MIN: CPT | Mod: 25 | Performed by: FAMILY MEDICINE

## 2021-09-07 PROCEDURE — 36415 COLL VENOUS BLD VENIPUNCTURE: CPT | Performed by: FAMILY MEDICINE

## 2021-09-07 RX ORDER — DIPHENOXYLATE HYDROCHLORIDE AND ATROPINE SULFATE 2.5; .025 MG/1; MG/1
1 TABLET ORAL
COMMUNITY
End: 2021-09-07

## 2021-09-07 RX ORDER — LISINOPRIL 10 MG/1
10 TABLET ORAL DAILY
COMMUNITY
Start: 2021-02-16 | End: 2021-09-07 | Stop reason: DRUGHIGH

## 2021-09-07 RX ORDER — ALLOPURINOL 100 MG/1
TABLET ORAL
COMMUNITY
Start: 2020-11-23 | End: 2021-09-07 | Stop reason: DRUGHIGH

## 2021-09-07 RX ORDER — ATORVASTATIN CALCIUM 40 MG/1
40 TABLET, FILM COATED ORAL
COMMUNITY
End: 2021-09-07

## 2021-09-07 RX ORDER — CLOPIDOGREL BISULFATE 75 MG/1
75 TABLET ORAL DAILY
COMMUNITY
Start: 2021-03-12 | End: 2022-05-12

## 2021-09-07 RX ORDER — AMLODIPINE BESYLATE 10 MG/1
10 TABLET ORAL DAILY
Qty: 90 TABLET | Refills: 3 | Status: SHIPPED | OUTPATIENT
Start: 2021-09-07 | End: 2022-09-09

## 2021-09-07 RX ORDER — LISINOPRIL 20 MG/1
20 TABLET ORAL DAILY
Qty: 90 TABLET | Refills: 3 | Status: SHIPPED | OUTPATIENT
Start: 2021-09-07 | End: 2022-02-10

## 2021-09-07 ASSESSMENT — ACTIVITIES OF DAILY LIVING (ADL)
FALL_HISTORY_WITHIN_LAST_SIX_MONTHS: NO
DOING_ERRANDS_INDEPENDENTLY_DIFFICULTY: NO
DIFFICULTY_COMMUNICATING: NO
DIFFICULTY_EATING/SWALLOWING: NO
CURRENT_FUNCTION: NO ASSISTANCE NEEDED
WEAR_GLASSES_OR_BLIND: NO
PATIENT_/_FAMILY_COMMUNICATION_STYLE: SPOKEN LANGUAGE (ENGLISH OR BILINGUAL)
TOILETING_ISSUES: NO
WALKING_OR_CLIMBING_STAIRS_DIFFICULTY: NO
DRESSING/BATHING_DIFFICULTY: NO
CONCENTRATING,_REMEMBERING_OR_MAKING_DECISIONS_DIFFICULTY: NO
HEARING_DIFFICULTY_OR_DEAF: NO

## 2021-09-07 ASSESSMENT — MIFFLIN-ST. JEOR: SCORE: 1768.73

## 2021-09-07 NOTE — PROGRESS NOTES
SUBJECTIVE:   Dung Negrete is a 66 year old male who presents for Preventive Visit.    Patient has been advised of split billing requirements and indicates understanding: Yes   Are you in the first 12 months of your Medicare coverage?  No    Do you feel safe in your environment? Yes    Have you ever done Advance Care Planning? (For example, a Health Directive, POLST, or a discussion with a medical provider or your loved ones about your wishes):       Fallen 2 or more times in the past year?: No  Any fall with injury in the past year?: No      1) Repeat 3 items   2) Clock draw: NORMAL  3) 3 item recall: Recalls 3 objects  Results: 3 items recalled: COGNITIVE IMPAIRMENT LESS LIKELY    Mini-CogTM Copyright S Ramón. Licensed by the author for use in [employer]; reprinted with permission ([email or web address]). All rights reserved.      His medical history includes peripheral vascular disease, hypertension, gout, osteoarthritis, lumbar radiculopathy.  He was recently seen in 2021 for adjustment of blood pressure medication and to evaluate possible arrhythmia.  At that time we also discussed erectile dysfunction.    He reports no recent concerns with gout.    Reviewed recent visit with vascular clinic.  Patient reports no significant concerns at this point in time symptoms are stable.    Despite adding amlodipine 5 mg blood pressure still elevated discussed increasing to 10.    Reviewed other aspects of routine health care and health prevention discussed prostate cancer screening and other forms of health screening including vaccinations.      Reviewed and updated as needed this visit by clinical staff      Reviewed and updated as needed this visit by Provider     Social History     Tobacco Use     Smoking status: Former Smoker     Packs/day: 1.00     Years: 39.00     Pack years: 39.00     Types: Cigarettes, Cigarettes     Quit date: 2014     Years since quittin.2     Smokeless tobacco: Former User  "    Quit date: 2/4/2005   Substance Use Topics     Alcohol use: Yes     Comment: Alcoholic Drinks/day: occasional on weekends         Alcohol Use 9/7/2021   Prescreen: >3 drinks/day or >7 drinks/week? Yes   AUDIT SCORE  5       Answers for HPI/ROS submitted by the patient on 9/7/2021  In general, how would you rate your overall physical health?: good  Frequency of exercise:: None  Do you usually eat at least 4 servings of fruit and vegetables a day, include whole grains & fiber, and avoid regularly eating high fat or \"junk\" foods? : No  Taking medications regularly:: Yes  Medication side effects:: None  Activities of Daily Living: no assistance needed  Home safety: no safety concerns identified  Hearing Impairment:: no hearing concerns  In the past 6 months, have you been bothered by leaking of urine?: No  In general, how would you rate your overall mental or emotional health?: good  Additional concerns today:: No            Current providers sharing in care for this patient include:   Patient Care Team:  Elmo Crump MD as PCP - General  Elmo Crump MD as Assigned PCP  Adry Mandel MD as Assigned Heart and Vascular Provider    The following health maintenance items are reviewed in Epic and correct as of today:  Health Maintenance Due   Topic Date Due     ANNUAL REVIEW OF HM ORDERS  Never done     HEPATITIS C SCREENING  Never done     Pneumococcal Vaccine: 65+ Years (2 of 2 - PPSV23) 03/19/2020     FALL RISK ASSESSMENT  08/24/2021     INFLUENZA VACCINE (1) 09/01/2021               Review of Systems  Complete review of systems is obtained.  Other than the specific considerations noted above complete review of systems is negative.      OBJECTIVE:   BP (!) 144/82   Pulse 79   Ht 1.803 m (5' 11\")   Wt 97.2 kg (214 lb 3.2 oz)   SpO2 98%   BMI 29.87 kg/m   Estimated body mass index is 29.87 kg/m  as calculated from the following:    Height as of this encounter: 1.803 m (5' 11\").    Weight as of this " encounter: 97.2 kg (214 lb 3.2 oz).  Physical Exam      General Appearance:    Alert, cooperative, no distress   Eyes:   No scleral icterus or conjunctival irritation       Ears:    Normal TM's and external ear canals, both ears   Throat:   Lips, mucosa, and tongue normal; teeth and gums normal   Neck:   Supple, symmetrical, trachea midline, no adenopathy;        thyroid:  No enlargement/tenderness/nodules   Lungs:     Clear to auscultation bilaterally, respirations unlabored, no wheezes or crackles   Heart:    Regular rate and rhythm,  No murmur   Abdomen:    Soft, no distention, no tenderness on palpation, no masses, no organomegaly     Extremities:  No edema, no joint swelling or redness, no evidence of any injuries   Skin:  No concerning skin findings, no suspicious moles, no rashes   Neurologic:  On gross examination there is no motor or sensory deficit.  Patient walks with a normal gait                 ASSESSMENT / PLAN:   Dung was seen today for wellness visit and medication question.    Diagnoses and all orders for this visit:    Mixed hyperlipidemia  -     Lipid panel reflex to direct LDL Fasting  -     Comprehensive metabolic panel; Future  -     Comprehensive metabolic panel  -     Cancel: Uric acid  -     Cancel: Comprehensive metabolic panel    Gastroesophageal reflux disease without esophagitis  -     Cancel: Uric acid  -     Cancel: Comprehensive metabolic panel    Essential hypertension  -     amLODIPine (NORVASC) 10 MG tablet; Take 1 tablet (10 mg) by mouth daily  -     lisinopril (ZESTRIL) 20 MG tablet; Take 1 tablet (20 mg) by mouth daily  -     Cancel: Comprehensive metabolic panel; Future  -     Comprehensive metabolic panel; Future  -     Comprehensive metabolic panel  -     Cancel: Uric acid  -     Cancel: Comprehensive metabolic panel    Gouty arthropathy  -     Cancel: Uric acid; Future  -     Uric acid; Future  -     Uric acid  -     Cancel: Uric acid  -     Cancel: Comprehensive  "metabolic panel    Hyperlipidemia, unspecified hyperlipidemia type  -     Cancel: Comprehensive metabolic panel; Future  -     Comprehensive metabolic panel; Future  -     Comprehensive metabolic panel  -     Cancel: Uric acid  -     Cancel: Comprehensive metabolic panel    Primary osteoarthritis involving multiple joints  -     Cancel: Uric acid  -     Cancel: Comprehensive metabolic panel    PVD (peripheral vascular disease) (H)  -     Cancel: Uric acid  -     Cancel: Comprehensive metabolic panel    Lumbar radiculopathy  -     Cancel: Uric acid  -     Cancel: Comprehensive metabolic panel    Other orders  -     PPSV23, IM/SUBQ (2+ YRS) - [id number]           Patient has been advised of split billing requirements and indicates understanding:   COUNSELING:      Estimated body mass index is 29.87 kg/m  as calculated from the following:    Height as of this encounter: 1.803 m (5' 11\").    Weight as of this encounter: 97.2 kg (214 lb 3.2 oz).        He reports that he quit smoking about 7 years ago. His smoking use included cigarettes and cigarettes. He has a 39.00 pack-year smoking history. He quit smokeless tobacco use about 16 years ago.      Appropriate preventive services were discussed with this patient, including applicable screening as appropriate for cardiovascular disease, diabetes, osteopenia/osteoporosis, and glaucoma.  As appropriate for age/gender, discussed screening for colorectal cancer, prostate cancer, breast cancer, and cervical cancer. Checklist reviewing preventive services available has been given to the patient.    Reviewed patients plan of care and provided an AVS. The  for Dung meets the Care Plan requirement. This Care Plan has been established and reviewed with the .    Counseling Resources:  ATP IV Guidelines  Pooled Cohorts Equation Calculator  Breast Cancer Risk Calculator  Breast Cancer: Medication to Reduce Risk  FRAX Risk Assessment  ICSI Preventive Guidelines  Dietary Guidelines " for Americans, 2010  USDA's MyPlate  ASA Prophylaxis  Lung CA Screening    Elmo Crump MD, MD  [facility]    Identified Health Risks:    Wt Readings from Last 3 Encounters:   09/07/21 97.2 kg (214 lb 3.2 oz)   06/11/21 95.4 kg (210 lb 4.8 oz)   12/16/20 97.1 kg (214 lb)        BP Readings from Last 6 Encounters:   09/07/21 (!) 144/82   06/11/21 (!) 156/82   06/03/21 (!) 170/100   12/16/20 (!) 152/82   09/09/20 (!) 150/88   08/24/20 (!) 140/80

## 2021-09-09 LAB
ALBUMIN SERPL-MCNC: 4.1 G/DL (ref 3.5–5)
ALP SERPL-CCNC: 89 U/L (ref 45–120)
ALT SERPL W P-5'-P-CCNC: 25 U/L (ref 0–45)
ANION GAP SERPL CALCULATED.3IONS-SCNC: 11 MMOL/L (ref 5–18)
AST SERPL W P-5'-P-CCNC: 25 U/L (ref 0–40)
BILIRUB SERPL-MCNC: 0.5 MG/DL (ref 0–1)
BUN SERPL-MCNC: 17 MG/DL (ref 8–22)
CALCIUM SERPL-MCNC: 9.6 MG/DL (ref 8.5–10.5)
CHLORIDE BLD-SCNC: 109 MMOL/L (ref 98–107)
CO2 SERPL-SCNC: 22 MMOL/L (ref 22–31)
CREAT SERPL-MCNC: 0.98 MG/DL (ref 0.7–1.3)
GFR SERPL CREATININE-BSD FRML MDRD: 79 ML/MIN/1.73M2
GLUCOSE BLD-MCNC: 93 MG/DL (ref 70–125)
POTASSIUM BLD-SCNC: 4.8 MMOL/L (ref 3.5–5)
PROT SERPL-MCNC: 6.8 G/DL (ref 6–8)
SODIUM SERPL-SCNC: 142 MMOL/L (ref 136–145)
URATE SERPL-MCNC: 6.1 MG/DL (ref 3–8)

## 2021-09-28 DIAGNOSIS — G89.29 CHRONIC PAIN OF BOTH SHOULDERS: ICD-10-CM

## 2021-09-28 DIAGNOSIS — M25.512 CHRONIC PAIN OF BOTH SHOULDERS: ICD-10-CM

## 2021-09-28 DIAGNOSIS — M25.511 CHRONIC PAIN OF BOTH SHOULDERS: ICD-10-CM

## 2021-09-29 RX ORDER — CELECOXIB 100 MG/1
CAPSULE ORAL
Qty: 90 CAPSULE | Refills: 2 | Status: SHIPPED | OUTPATIENT
Start: 2021-09-29 | End: 2022-07-23

## 2021-09-29 NOTE — TELEPHONE ENCOUNTER
"Routing refill request to provider for review/approval because:  Labs out of range:  Multiple   Age warning    Last Written Prescription Date:  10/4/20  Last Fill Quantity: 90,  # refills: 3   Last office visit provider:  9/7/21     Requested Prescriptions   Pending Prescriptions Disp Refills     celecoxib (CELEBREX) 100 MG capsule [Pharmacy Med Name: CELECOXIB 100 MG CAPSULE] 90 capsule 2     Sig: TAKE 1 CAPSULE BY MOUTH EVERY DAY       NSAID Medications Failed - 9/28/2021 12:27 AM        Failed - Blood pressure under 140/90 in past 12 months     BP Readings from Last 3 Encounters:   09/07/21 (!) 144/82   06/11/21 (!) 156/82   06/03/21 (!) 170/100                 Failed - Patient is age 6-64 years        Failed - Normal CBC on file in past 12 months     Recent Labs   Lab Test 05/22/20  0739 08/23/19  0921 08/13/19  0909   WBC  --   --  6.6   RBC  --   --  5.03   HGB 14.4   < > 15.4   HCT  --   --  44.7      < > 150    < > = values in this interval not displayed.                 Passed - Normal ALT on file in past 12 months     Recent Labs   Lab Test 09/07/21  1220   ALT 25             Passed - Normal AST on file in past 12 months     Recent Labs   Lab Test 09/07/21  1220   AST 25             Passed - Recent (12 mo) or future (30 days) visit within the authorizing provider's specialty     Patient has had an office visit with the authorizing provider or a provider within the authorizing providers department within the previous 12 mos or has a future within next 30 days. See \"Patient Info\" tab in inbasket, or \"Choose Columns\" in Meds & Orders section of the refill encounter.              Passed - Medication is active on med list        Passed - Normal serum creatinine on file in past 12 months     Recent Labs   Lab Test 09/07/21  1220   CR 0.98       Ok to refill medication if creatinine is low               Brown Dawson RN 09/29/21 9:46 AM  "

## 2021-10-16 ENCOUNTER — HEALTH MAINTENANCE LETTER (OUTPATIENT)
Age: 66
End: 2021-10-16

## 2021-11-01 DIAGNOSIS — I73.9 PVD (PERIPHERAL VASCULAR DISEASE) (H): ICD-10-CM

## 2021-11-01 RX ORDER — CILOSTAZOL 100 MG/1
TABLET ORAL
Qty: 180 TABLET | Refills: 1 | Status: SHIPPED | OUTPATIENT
Start: 2021-11-01 | End: 2022-02-03

## 2021-12-01 DIAGNOSIS — M10.9 GOUTY ARTHROPATHY: ICD-10-CM

## 2021-12-03 RX ORDER — ALLOPURINOL 300 MG/1
TABLET ORAL
Qty: 90 TABLET | Refills: 3 | Status: SHIPPED | OUTPATIENT
Start: 2021-12-03 | End: 2022-02-03

## 2021-12-03 NOTE — TELEPHONE ENCOUNTER
"Routing refill request to provider for review/approval because:  Labs out of range:  Uric Acid  Labs not current:  CBC    Last Written Prescription Date:  1/11/2021  Last Fill Quantity: 90,  # refills: 3   Last office visit provider:  9/7/2021     Requested Prescriptions   Pending Prescriptions Disp Refills     allopurinol (ZYLOPRIM) 300 MG tablet [Pharmacy Med Name: ALLOPURINOL 300 MG TABLET] 90 tablet 3     Sig: TAKE 1 TABLET BY MOUTH EVERY DAY       Gout Agents Protocol Failed - 12/1/2021  8:29 AM        Failed - CBC on file in past 12 months     Recent Labs   Lab Test 05/22/20  0739 08/23/19  0921 08/13/19  0909   WBC  --   --  6.6   RBC  --   --  5.03   HGB 14.4   < > 15.4   HCT  --   --  44.7      < > 150    < > = values in this interval not displayed.                 Failed - Has Uric Acid on file in past 12 months and value is less than 6     Recent Labs   Lab Test 09/07/21  1220   URIC 6.1     If level is 6mg/dL or greater, ok to refill one time and refer to provider.           Passed - ALT on file in past 12 months     Recent Labs   Lab Test 09/07/21  1220   ALT 25             Passed - Recent (12 mo) or future (30 days) visit within the authorizing provider's specialty     Patient has had an office visit with the authorizing provider or a provider within the authorizing providers department within the previous 12 mos or has a future within next 30 days. See \"Patient Info\" tab in inbasket, or \"Choose Columns\" in Meds & Orders section of the refill encounter.              Passed - Medication is active on med list        Passed - Patient is age 18 or older        Passed - Normal serum creatinine on file in the past 12 months     Recent Labs   Lab Test 09/07/21  1220   CR 0.98       Ok to refill medication if creatinine is low               Shauna Lee RN 12/02/21 11:09 PM  "

## 2021-12-09 ENCOUNTER — ANCILLARY PROCEDURE (OUTPATIENT)
Dept: VASCULAR ULTRASOUND | Facility: CLINIC | Age: 66
End: 2021-12-09
Attending: SURGERY
Payer: MEDICARE

## 2021-12-09 DIAGNOSIS — I65.29 CAROTID STENOSIS: ICD-10-CM

## 2021-12-09 DIAGNOSIS — I73.9 PVD (PERIPHERAL VASCULAR DISEASE) (H): ICD-10-CM

## 2021-12-09 PROCEDURE — 93925 LOWER EXTREMITY STUDY: CPT | Mod: 26 | Performed by: INTERNAL MEDICINE

## 2021-12-09 PROCEDURE — 93880 EXTRACRANIAL BILAT STUDY: CPT | Mod: 26 | Performed by: INTERNAL MEDICINE

## 2021-12-09 PROCEDURE — 93923 UPR/LXTR ART STDY 3+ LVLS: CPT

## 2021-12-09 PROCEDURE — 93925 LOWER EXTREMITY STUDY: CPT

## 2021-12-09 PROCEDURE — 93923 UPR/LXTR ART STDY 3+ LVLS: CPT | Mod: 26 | Performed by: INTERNAL MEDICINE

## 2021-12-09 PROCEDURE — 93880 EXTRACRANIAL BILAT STUDY: CPT

## 2021-12-17 ENCOUNTER — VIRTUAL VISIT (OUTPATIENT)
Dept: VASCULAR SURGERY | Facility: CLINIC | Age: 66
End: 2021-12-17
Attending: SURGERY
Payer: MEDICARE

## 2021-12-17 DIAGNOSIS — I65.22 STENOSIS OF LEFT CAROTID ARTERY: ICD-10-CM

## 2021-12-17 DIAGNOSIS — I73.9 PVD (PERIPHERAL VASCULAR DISEASE) (H): Primary | ICD-10-CM

## 2021-12-17 PROCEDURE — 99214 OFFICE O/P EST MOD 30 MIN: CPT | Mod: TEL | Performed by: SURGERY

## 2021-12-17 NOTE — PATIENT INSTRUCTIONS
Patient Education     Peripheral Artery Disease (PAD)   Peripheral artery disease (PAD) occurs when the arteries that carry blood to the limbs are narrowed or blocked. This is usually due to a buildup of a fatty substance called plaque in the walls of the arteries.  PAD most often affects the arteries in the legs. When these arteries are narrowed or blocked, blood flow to the legs is reduced. This can cause leg and foot pain and other symptoms. If severe enough, reduced blood flow to the legs can lead to tissue death (gangrene) and the loss of a toe, foot, or leg. Having PAD also makes it more likely that arteries in other body areas are blocked. For instance, arteries that carry blood to the heart or brain may be affected. This raises the chances of heart attack and stroke.  Risk factors  Certain factors can make PAD more likely. They include:    Smoking    Diabetes    High blood pressure    Unhealthy cholesterol levels    Obesity    Inactive lifestyle    Older age    Family history of PAD  Symptoms  Many people with PAD have no symptoms. If symptoms do occur, they can include:    Pain in the muscles of the calves, thighs, or hips that gets worse with activity and better with rest (intermittent claudication)    Achy, tired, or heavy feeling in the legs    Weakness, numbness, tingling, or loss of feeling in the legs    Changes in skin color of the legs    Sores on the legs and feet    Cold leg, feet, or toes    Pain the feet or toes even when lying down (rest pain)  Home care  PAD is a chronic (lifelong) condition. Treatment is focused on managing your condition and lowering your health risks. This may include doing the following:    If you smoke, quit. This helps prevent further damage to your arteries and lowers your health risks. Ask your provider about medicines or products that can help you quit smoking. Also consider joining a stop-smoking program or support group.    Be more active. This helps you lose weight  and manage problems such as high blood pressure and unhealthy cholesterol levels. Start a walking program if advised to by your provider. Your provider may also help you form a safe exercise program that is right for your needs.    Make healthy eating changes. This includes eating less fat, salt, and sugar.    Take medicines for high blood pressure, unhealthy cholesterol levels, and diabetes as directed.    Have your blood pressure and cholesterol levels checked as often as directed.    If you have diabetes, try to keep your blood sugar well controlled. Test your blood sugar as directed.    If you are overweight, talk to your provider about a weight-loss plan.    Watch for cuts, scrapes, or open sores on your feet. Poor blood flow to the feet may slow healing and increase the risk of infection from these problems.   Follow-up care  Follow up with your healthcare provider, or as advised. If imaging tests such as ultrasound were done, they will be reviewed by a doctor. You will be told the results and any new findings that may affect your care.  When to seek medical advice   Call your healthcare provider right away if any of these occur:    Sudden severe pain in the legs or feet    Sudden cold, pale or blue color in the legs or feet    Weakness or numbness in the legs or feet that worsens    Any sore or wound in the legs or feet that won t heal    Weak pulse in your legs or feet  Know the signs of heart attack and stroke  People with PAD are at high risk for heart attack and stroke. Knowing the signs of these problems can help you protect your health and get help when you need it. Call 911 right away if you have any of the following:    Chest discomfort, such as pain, aching, tightness, or pressure that lasts more than a few minutes, or that comes and goes    Pain or discomfort in the arms, back, shoulders, neck, or jaw    Shortness of breath    Sweating (often a cold, clammy  sweat)    Nausea    Lightheadedness    Sudden numbness or weakness of the face, arms, or legs, especially on one side    Sudden confusion or trouble speaking or understanding    Sudden trouble seeing in one or both eyes    Sudden trouble walking, dizziness, or loss of balance    Sudden, severe headache with no known cause  Ritchie last reviewed this educational content on 3/1/2018    [phone] [employer]. All rights reserved. This information is not intended as a substitute for professional medical care. Always follow your healthcare professional's instructions.

## 2021-12-17 NOTE — PROGRESS NOTES
[facility]        Patient is here via telephone visit for a 6month follow up  to discuss PAD bilateral. known right internal carotid artery occlusion. ASA, Pletal, statin.    There were no vitals taken for this visit.    The provider has been notified that the patient has no concerns.     Questions patient would like addressed today are: N/A.    Refills are needed: No    Has homecare services and agency name:  Abbie Morris RN

## 2021-12-17 NOTE — PROGRESS NOTES
VASCULAR SURGERY OUTPATIENT VIRTUAL CONSULT OR VISIT   VASCULAR SURGEON: Adry Mandel MD    LOCATION:  Virtual visit done using telephone    Dung Negrete   Medical Record #:  [id number]  YOB: 1955  Age:  66 year old     Date of Service: 12/17/2021    PRIMARY CARE PROVIDER: Elmo Crump      Reason for consultation: Evaluation of PAD and carotid disease.    IMPRESSION: Patient doing very well overall.  Living in his cabin and thoroughly enjoying it.  Does notice that he has fairly significant claudication of the left leg particularly when takes the garbage out which means he has to go up and down a hill.  Otherwise has noticed some improvement in his walking with more exercise.  Switch to taking cilostazol 2 tablets in the morning so that he does not forget the afternoon dose and this is going just fine without any side effects.  On duplex imaging his carotid study is unchanged with an occluded left carotid and a widely patent right carotid.  ABIs are normal bilaterally at rest.  Duplex does show a widely patent right SFA stent.  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    RECOMMENDATION: Patient who does appear to have fairly symptomatic claudication of his left leg and duplex imaging consistent with a Glenn's canal stenosis.  Right leg doing well status post SFA stenting.  Tolerating full dose cilostazol without issues.  Is trying to do an nonsupervised exercise regimen and feels that he has made some progress.  In this context I will see him back in 6 bring with a formal De La Paz Newman test and a CTA runoff to see what the safety would be of treating his left leg SFA.  We will not repeat his carotid duplex for another year.    HPI:  Dung Negrete is a 66 year old male who was evaluated today for his PAD.  Is a patient who has a drug-eluting stent on his right side for very short distance claudication that had failed aggressive medical management with a supervised exercise  regimen.  The patient had a history of left SFA angioplasty in 2005.  Initially treated by me with drug-eluting balloon angioplasty in 2019 but this rapidly recurred with both clinical claudication and evidence of critical stenosis.  Subsequently underwent drug-eluting stent placement by me in May 2020 with good outcome.  At that time it was noted that the left SFA, while mildly stenosed was still open.    Since our last visit the patient has moved out of town and is now living in his cabin.  Overall doing very well with this set up but does notice left leg claudication now with activities that require him to go up a hill such as taking out the garbage.  Also notices limitation when he goes out hunting.  Overall trying to exercise more and feels that he is coping reasonably well.  Patient remains compliant with Plavix and cilostazol in addition to a statin.  He has been taking 2 cilostazol tablets in the morning rather than twice during the day because he is able to avoid forgetting to take the afternoon dose with this regimen.  Not having any side effects of diarrhea or other issues with this strategy.  Patient certainly does not have any rest pain or other issues.  Not smoking.    No symptoms concerning for carotid disease.      PHH:    Past Medical History:   Diagnosis Date     Cellulitis and abscess of leg 8/31/2006     Dyspnea 5/27/2015     Enthesopathy of knee 8/31/2006     GERD (gastroesophageal reflux disease) 1/8/2017     Health examination of defined subpopulation 12/27/2005     History of total hip arthroplasty, left 12/28/2018     Mixed hyperlipidemia     Created by Conversion      Polyarthralgia 5/17/2017     Primary osteoarthritis involving multiple joints 5/17/2017     Sprain of neck     Created by Conversion         Past Surgical History:   Procedure Laterality Date     ANGIOPLASTY Left 2007     IR EXTREMITY ANGIOGRAM BILATERAL  4/17/2007     IR EXTREMITY ANGIOGRAM RIGHT  8/23/2019     IR EXTREMITY  ANGIOGRAM RIGHT  5/22/2020     IR EXTREMITY ANGIOGRAM RIGHT  8/23/2019     IR EXTREMITY ANGIOGRAM RIGHT  5/22/2020     TOTAL HIP ARTHROPLASTY Left 12/28/2019     TOTAL HIP ARTHROPLASTY Right        ALLERGIES:  Patient has no known allergies.    MEDS:    Current Outpatient Medications:      allopurinol (ZYLOPRIM) 300 MG tablet, TAKE 1 TABLET BY MOUTH EVERY DAY, Disp: 90 tablet, Rfl: 3     amLODIPine (NORVASC) 10 MG tablet, Take 1 tablet (10 mg) by mouth daily, Disp: 90 tablet, Rfl: 3     aspirin 81 MG EC tablet, [ASPIRIN 81 MG EC TABLET] Take 81 mg by mouth daily., Disp: , Rfl:      celecoxib (CELEBREX) 100 MG capsule, TAKE 1 CAPSULE BY MOUTH EVERY DAY, Disp: 90 capsule, Rfl: 2     cholecalciferol, vitamin D3, (VITAMIN D3) 400 unit cap, [CHOLECALCIFEROL, VITAMIN D3, (VITAMIN D3) 400 UNIT CAP] Take 1 capsule by mouth daily., Disp: , Rfl:      cilostazol (PLETAL) 100 MG tablet, TAKE 1 TABLET BY MOUTH TWICE A DAY, Disp: 180 tablet, Rfl: 1     clopidogrel (PLAVIX) 75 MG tablet, Take 75 mg by mouth daily, Disp: , Rfl:      LANsoprazole (PREVACID) 30 MG DR capsule, TAKE 1 CAPSULE BY MOUTH EVERY DAY, Disp: 90 capsule, Rfl: 3     lisinopril (ZESTRIL) 20 MG tablet, Take 1 tablet (20 mg) by mouth daily, Disp: 90 tablet, Rfl: 3     MULTIVITAMIN ORAL, [MULTIVITAMIN ORAL] Take 1 tablet by mouth daily., Disp: , Rfl:      rosuvastatin (CRESTOR) 40 MG tablet, [ROSUVASTATIN (CRESTOR) 40 MG TABLET] Take 1 tablet (40 mg total) by mouth at bedtime., Disp: 90 tablet, Rfl: 3     sildenafiL (VIAGRA) 50 MG tablet, [SILDENAFIL (VIAGRA) 50 MG TABLET] Take 1 tablet (50 mg total) by mouth as needed for erectile dysfunction., Disp: 30 tablet, Rfl: 1    SOCIAL HABITS:    History   Smoking Status     Former Smoker     Packs/day: 1.00     Years: 39.00     Types: Cigarettes, Cigarettes     Quit date: 6/1/2014   Smokeless Tobacco     Former User     Quit date: 2/4/2005     Social History    Substance and Sexual Activity      Alcohol use: Yes         Comment: Alcoholic Drinks/day: occasional on weekends      History   Drug Use     Types: Marijuana       FAMILY HISTORY:    Family History   Problem Relation Age of Onset     No Known Problems Mother      Heart Disease Father      Hypertension Father      Diabetes Sister      Heart Disease Brother      Diabetes Brother      Coronary Artery Disease Brother      Diabetes Sister        REVIEW OF SYSTEMS:    A 12 point ROS was reviewed and except for what is listed in the HPI above, all others are negative    PE:  There were no vitals taken for this visit.  Wt Readings from Last 1 Encounters:   09/07/21 214 lb 3.2 oz (97.2 kg)     There is no height or weight on file to calculate BMI.    EXAM:  EXAM LIMITED AS THIS IS A VIRTUAL VISIT with telephone      DIAGNOSTIC STUDIES:     Images:  US Carotid Bilateral    Result Date: 12/9/2021  BILATERAL CAROTID ULTRASOUND (Date: 12/09/21) Indication: Surveillance of right ICA stenosis/left ICA occlusion Endarterectomy: NONE   Previous: 09/04/2019-US; 12/10/2020   Symptoms: Hypertension and Smoking TECHNIQUE: Duplex exam performed utilizing 2D gray-scale imaging, Doppler interrogation with color-flow and spectral waveform analysis. Right Velocity  Chart (cm/sec) Location Right CCA-PS 97 CCA- ED 18 ICA-PS 93 ICA-ED 14 ECA- ECA-ED 31 Vertebral- Antegrade 62 Subclavian A- Triphasic 129 Ratio ICA/CCA-PS 0.96 Ratio ICA/CCA-ED 0.77 Left Velocity  Chart (cm/sec) Location Left CCA-PS 95 CCA- ED 12 ICA-PS 0 ICA-ED 0 ECA- ECA-ED 24 Vertebral- Antegrade 52 Subclavian A- Triphasic 120 Ratio ICA/CCA-PS - Ratio ICA/CCA-ED - Comment: Hard calcified plaque seen at the distal right common carotid artery, soft plaque seen in left Bulb/ICA FINDINGS: RIGHT: There is uniformly echogenic  atheromatous plaque.  LEFT: There is uniformly echogenic atheromatous plaque.  RIGHT: Vertebral artery and subclavian artery waveforms are triphasic waveform pattern with antegrade flow. LEFT: Vertebral  artery and subclavian artery waveforms are triphasic waveform pattern with antegrade flow. Impression:  1. Less than 50% diameter stenosis of the right ICA relative to the distal ICA diameter. 2. Occlusion of the left ICA relative to the distal ICA diameter. Evaluation based on velocities and NASCET criteria Category PSV (cm/s)  Plaque Imaging EDV (cm/s)  ICA/CCA Ratio Normal,  <125  None <40 <2 Mild <50% <125 < than 50% DR stenosis <40 <2 Moderate Disease 50-69% 125-230 > than 50% DR stenosis  2.0-4.0 Severe->70% but less than near occlusion >230 > than 50% DR stenosis >100 >4.0 Near Occlusion May be low or undetectable Visible, extensive Variable Variable Occlusion No Flow Visible with no detectable lumen N/A N/A Plaquetype Description Type 1 Uniformly echolucent Type 2 Predominantly echolucent >50% Type 3 Predominantly echogenic >50% Type 4 Uniformly echogenic Type 5 Unclassified due to poor visualization Ulcer Visible Ulcerative Plaque     US Lower Extremity Arterial Duplex Bilateral    Result Date: 12/9/2021  Arterial Duplex Ultrasound (Date: 12/09/21) Lower Extremity Artery Evaluation Indication: SURVEILLANCE: RIGHT SFA MID STENT (5/22/2020). PVD   Previous: 06/03/21   Angioplasty Date: 9/9/2020   History: Previous Smoker, Hypertension, Hyperlipidemia, PAD, Angioplasty and Vascular Surgery Technique: Duplex imaging is performed utilizing gray-scale, two-dimensional images, and color-flow imaging. Doppler waveform analysis and spectral Doppler imaging is also performed.  LOWER EXTREMITY ARTERIAL DUPLEX EXAM WITH WAVEFORMS  Right Leg:(cm/s) Location Velocities Waveforms EIA   147  T CFA   166  T PFA   218  B SFA Proximal   130  T SFA Mid   215  T SFA Distal   234  T Popliteal Artery   112  T PTA   82   B DAVIS   42  B DPA   44  B Waveforms: T=Triphasic, M=Monophasic, B=Biphasic Left Leg:(cm/s) Location: Velocities Waveforms EIA:   134  T CFA:   110  T PFA:   377  T SFA Proximal:   143  B SFA Mid:    130/295/218  T/T/T SFA Distal:   69  T Popliteal Artery:   123  T PTA:   65   B DAVIS:   17  M DPA:   24  B Waveforms: T=Triphasic, M=Monophasic, B=Biphasic Stenotic Profile Ratio: 295 / 130 = 2.26 Stent Velocities: Stent 1: Right Leg Stent location: SFA Mid      Velocity    Waveform Inflow Artery: SFA PROX       Proximal to Stent:   210   T Proximal Stent:   155  T Mid Stent:   215  T Distal Stent:   199  T Distal to Stent:   234  T Outflow Artery: SFA DISTAL      Impression: Right Lower Extremity: Patent right SFA (superficial femoral artery stent) with mainly triphasic waveform pattern and no hemodynamically significant stenosis Left Lower Extremity: Mainly multiphasic waveform pattern in the left lower extremity arterial system with elevated velocity in the mid left SFA indicating possible hemodynamically significant lesion, clinical correlation is recommended, Monophasic waveform pattern in the left anterior tibial artery that might represent atherosclerotic vessel/flow distal to stenosis. Reference: Category Normal 1-19% 20-49% 50-99% Occluded PSV <160 cm/sec without spectral broadening <160 cm/sec with spectral broadening Increased Increased Absent Flow Ratio N/A N/A < 2.0 >2.0 N/A Post-Stenotic Turbulence No No No Yes N/A     US L Ext Art Doppler Seg Pressures Sumanth 3 Lvl    Result Date: 12/9/2021  BILATERAL RESTING ANKLE-BRACHIAL INDICES (MACK'S) (Date: 12/09/21) Indication: SURVEILLANCE: RIGHT SFA MID STENT (5/22/2020). PVD   Previous: 06/03/21   Angioplasty Date: 9/9/2020   History: Previous Smoker, Hypertension, Hyperlipidemia, PAD, Angioplasty and Vascular Surgery  Resting MACK's          Right: mmHg Index     Brachial: 140  Ankle-(PT): 151 1.08 Ankle-(DP): 124 0.89          Digit: 69 0.49               Left: mmHg Index     Brachial: 138  Ankle-(PT): 133 0.95 Ankle-(DP): 142 1.01          Digit: 83 0.59 Resting ankle-brachial index of 1.08 on the right. Toe Pressures of 69 mmHg and TBI of 0.49  Resting  ankle-brachial index of 1.01 on the left. Toe Pressures of 83 mmHg and TBI of 0.59  WAVEFORMS: The right dorsalis pedis and posterior tibial arteries show biphasic waveforms. The left dorsalis pedis and posterior tibial arteries show biphasic waveforms. Impression:  1. RIGHT LOWER EXTREMITY: MACK is Normal with an MACK of 1.08. and Mildly abnormal, but adequate for healing toe pressures of 69 mmHg. 2. LEFT LOWER EXTREMITY: MACK is Normal with an MACK of 1.01. and Mildly abnormal, but adequate for healing toe pressures of 83 mmHg. Reference: Wound classification Grade MACK Ankle Systolic Pressure Toe Pressures 0 > 0.80 > 100 mmHg > 60 mmHg 1 0.6 - 0.79 70 - 100 mmHg 40 - 59 mmHg 2 0.4 - 0.59 50-70 mmHg 30 - 39 mmHg 3 < 0.39 < 50 mmHg < 30 mmHg Digit Pressures DBI Disease Category > 0.70 Normal < 0.70 Abnormal > 30 mmHg Potential wound healing < 30 mmHg Impaired wound healing Ankle Brachial Pressures MACK Disease Category > 1.3  Likely vessel calcification with monophasic waveforms, non-diagnostic 0.95-1.30 Normal with multiphasic waveforms 0.50-0.95 Single level disease 0.30-0.50 Multilevel disease < 0.30 Critical limb ischema Volume Plethysmography Recording (VPR) at all levels Normal Sharp systolic peak, fast upstroke, prominent dicrotic notch in wave Mild Sharp systolic peak, fast upstroke, absent dicrotic notch in wave Moderate Flattened systolic peak, slowed upstroke, absent dicrotic notch in wave Severe Low-amplitude wave with = upslope and down slope Occluded Flat Line Multiple Level Segmental Pressures  Normal Abnormal Upper Thigh > 1.2 pressure indices, <30 mmHg between lateral and horizontal cuffs < 0.8 pressure indices suggest proximal occlusion, 0.8-1.2 pressure indices suggest inflow disease, > 30 mmHg between lateral and horizontal cuffs  Lower Thigh < 30 mmHg between lateral and horizontal cuffs > 30 mmHg between lateral and horizontal cuffs Upper Calf < 30 mmHg between lateral and horizontal cuffs > 30 mmHg  between lateral and horizontal cuffs Note: As limb diameter increases, pressure measurements also increase.       I personally reviewed the images and my interpretation is that his ABIs and toe pressures are essentially normal.  On arterial duplex there is some mild increase in velocities through his stent but this likely is normal for the stent.  The left side however there is a significant narrowing seen in the SFA.  His carotid duplex is normal..    Carotid duplex shows left chronic occlusion and no evidence of stenosis on the right side.  This is unchanged from prior studies.    LABS:      Sodium   Date Value Ref Range Status   09/07/2021 142 136 - 145 mmol/L Final   08/25/2020 143 136 - 145 mmol/L Final   06/18/2019 142 136 - 145 mmol/L Final     Urea Nitrogen   Date Value Ref Range Status   09/07/2021 17 8 - 22 mg/dL Final   08/25/2020 18 8 - 22 mg/dL Final   06/18/2019 16 8 - 22 mg/dL Final     Hemoglobin   Date Value Ref Range Status   05/22/2020 14.4 14.0 - 18.0 g/dL Final   08/23/2019 15.6 14.0 - 18.0 g/dL Final   08/13/2019 15.4 14.0 - 18.0 g/dL Final     Platelet Count   Date Value Ref Range Status   05/22/2020 154 140 - 440 thou/uL Final   08/23/2019 161 140 - 440 thou/uL Final   08/13/2019 150 140 - 440 thou/uL Final     INR   Date Value Ref Range Status   05/22/2020 1.01 0.90 - 1.10 Final   08/23/2019 1.00 0.90 - 1.10 Final     This was a telephone visit that started at 9:50 AM and ended at 10:02 AM    30 minutes spent on the day of encounter doing chart review, history and exam, documentation, and further activities as noted.     Adry Mandel MD, MD  VASCULAR SURGERY

## 2022-02-03 DIAGNOSIS — I10 ESSENTIAL HYPERTENSION: ICD-10-CM

## 2022-02-03 DIAGNOSIS — I73.9 PVD (PERIPHERAL VASCULAR DISEASE) (H): ICD-10-CM

## 2022-02-03 DIAGNOSIS — M10.9 GOUTY ARTHROPATHY: ICD-10-CM

## 2022-02-10 RX ORDER — ALLOPURINOL 300 MG/1
1 TABLET ORAL DAILY
Qty: 90 TABLET | Refills: 3 | Status: SHIPPED | OUTPATIENT
Start: 2022-02-10 | End: 2022-12-29

## 2022-02-10 RX ORDER — CILOSTAZOL 100 MG/1
TABLET ORAL
Qty: 180 TABLET | Refills: 3 | Status: SHIPPED | OUTPATIENT
Start: 2022-02-10 | End: 2022-11-29

## 2022-02-10 RX ORDER — LISINOPRIL 20 MG/1
20 TABLET ORAL DAILY
Qty: 90 TABLET | Refills: 3 | Status: SHIPPED | OUTPATIENT
Start: 2022-02-10 | End: 2023-02-16

## 2022-04-01 ENCOUNTER — OFFICE VISIT (OUTPATIENT)
Dept: FAMILY MEDICINE | Facility: CLINIC | Age: 67
End: 2022-04-01
Payer: COMMERCIAL

## 2022-04-01 VITALS
WEIGHT: 217.8 LBS | SYSTOLIC BLOOD PRESSURE: 130 MMHG | OXYGEN SATURATION: 98 % | DIASTOLIC BLOOD PRESSURE: 78 MMHG | HEART RATE: 80 BPM | BODY MASS INDEX: 30.49 KG/M2 | HEIGHT: 71 IN

## 2022-04-01 DIAGNOSIS — M70.42 PREPATELLAR BURSITIS OF LEFT KNEE: Primary | ICD-10-CM

## 2022-04-01 PROCEDURE — 99213 OFFICE O/P EST LOW 20 MIN: CPT | Performed by: FAMILY MEDICINE

## 2022-04-01 NOTE — PROGRESS NOTES
"Dung Negrete  /78   Pulse 80   Ht 1.803 m (5' 11\")   Wt 98.8 kg (217 lb 12.8 oz)   SpO2 98%   BMI 30.38 kg/m       Assessment/Plan:                Dung was seen today for musculoskeletal problem.    Diagnoses and all orders for this visit:    Prepatellar bursitis of left knee         DISCUSSION  The exam findings are that of a prepatellar bursitis.  There is no sign of infection or inflammation.  Given its improvement on its own the best course of action I think is to allow this to continue to heal on its own rather than to place him at risk for development of scar tissue and/or infection with continued efforts to drain it.  Discussed that we refer him to see an orthopedic specialist if it remains persistent to see if they have an approach to a more definitive treatment.  He is comfortable with continuing with the more conservative care at this time.  Subjective:     HPI:    Dung Negrete is a 67 year old male for approximately 6 weeks has had swelling in the left knee.  Was seen at a walk-in clinic in [facility] where he resides.  Was diagnosed with bursitis.  Initially conservative therapy was recommended but he returned with persistent swelling and it was drained.  Drainage resulted in decreased fluid and swelling for about a week before fluid started to reaccumulate.  More recently with conservative care that was recommended including compression ice and protection he has noted improvement but it is still present.  He denies any pain or discomfort.  There has been no associated redness.  He reports continued improvement recently but a slight flareup today compared to other recent days.  Today we discussed the management of this in detail.    He reports otherwise overall he is doing well his blood pressure is noted to be much better.  He was seen just back in September.  He has no other specific concerns today.    ROS:  Complete review of systems is obtained.  Other than the specific " considerations noted above complete review of systems is negative.          Objective:   Medications:  Current Outpatient Medications   Medication     allopurinol (ZYLOPRIM) 300 MG tablet     amLODIPine (NORVASC) 10 MG tablet     aspirin 81 MG EC tablet     celecoxib (CELEBREX) 100 MG capsule     cholecalciferol, vitamin D3, (VITAMIN D3) 400 unit cap     cilostazol (PLETAL) 100 MG tablet     clopidogrel (PLAVIX) 75 MG tablet     LANsoprazole (PREVACID) 30 MG DR capsule     lisinopril (ZESTRIL) 20 MG tablet     Multiple Vitamin (MULTI VITAMIN) TABS     MULTIVITAMIN ORAL     rosuvastatin (CRESTOR) 40 MG tablet     sildenafiL (VIAGRA) 50 MG tablet     No current facility-administered medications for this visit.        Allergies:   No Known Allergies     Social History     Socioeconomic History     Marital status:      Spouse name: Not on file     Number of children: Not on file     Years of education: Not on file     Highest education level: Not on file   Occupational History     Not on file   Tobacco Use     Smoking status: Former Smoker     Packs/day: 1.00     Years: 39.00     Pack years: 39.00     Types: Cigarettes, Cigarettes     Quit date: 2014     Years since quittin.8     Smokeless tobacco: Former User     Quit date: 2005   Substance and Sexual Activity     Alcohol use: Yes     Comment: Alcoholic Drinks/day: occasional on weekends     Drug use: Yes     Types: Marijuana     Sexual activity: Yes     Partners: Female   Other Topics Concern     Not on file   Social History Narrative     Not on file     Social Determinants of Health     Financial Resource Strain: Not on file   Food Insecurity: Not on file   Transportation Needs: Not on file   Physical Activity: Not on file   Stress: Not on file   Social Connections: Not on file   Intimate Partner Violence: Not on file   Housing Stability: Not on file       Family History   Problem Relation Age of Onset     No Known Problems Mother      Heart  "Disease Father      Hypertension Father      Diabetes Sister      Heart Disease Brother      Diabetes Brother      Coronary Artery Disease Brother      Diabetes Sister         Most Recent Immunizations   Administered Date(s) Administered     COVID-19,PF,Pfizer (12+ Yrs) 12/06/2021     DT (PEDS <7y) 08/09/1998     Flu, Unspecified 09/23/2019     HepA, Unspecified 07/03/2012     HepA-Adult 07/03/2012     Influenza (High Dose) 3 valent vaccine 10/16/2020     Influenza Vaccine, 6+MO IM (QUADRIVALENT W/PRESERVATIVES) 02/14/2018     Influenza, Quad, High Dose, Pf, 65yr+ (Fluzone HD) 10/01/2021     Influenza,INJ,MDCK,PF,Quad >4yrs 10/29/2018     Pneumo Conj 13-V (2010&after) 03/19/2019     Pneumococcal 23 valent 09/07/2021     TD (ADULT, 7+) 02/19/2020     Td (Adult), Adsorbed 08/09/1998     Td,adult,historic,unspecified 08/09/1998     Tdap (Adacel,Boostrix) 07/03/2012     Zoster vaccine recombinant adjuvanted (SHINGRIX) 01/07/2020        Wt Readings from Last 3 Encounters:   04/01/22 98.8 kg (217 lb 12.8 oz)   09/07/21 97.2 kg (214 lb 3.2 oz)   06/11/21 95.4 kg (210 lb 4.8 oz)        BP Readings from Last 6 Encounters:   04/01/22 130/78   09/07/21 (!) 144/82   06/11/21 (!) 156/82   06/03/21 (!) 170/100   12/16/20 (!) 152/82   09/09/20 (!) 150/88        No results found for: A1C, HEMOGLOBINA1           PHYSICAL EXAM:    /78   Pulse 80   Ht 1.803 m (5' 11\")   Wt 98.8 kg (217 lb 12.8 oz)   SpO2 98%   BMI 30.38 kg/m       General: Patient alert no signs of distress    Examination of his knee reveals rather dramatic focal swelling of the anterior portion of the knee consistent with patellar bursitis.  There is no redness there is no pain on palpation.  Still has good range of motion and is able to walk with a normal gait.                          Answers for HPI/ROS submitted by the patient on 3/29/2022  On average, how many sweetened beverages do you drink each day (Examples: soda, juice, sweet tea, etc.  Do NOT " count diet or artificially sweetened beverages)?: 1  How many minutes a day do you exercise enough to make your heart beat faster?: 9 or less  How many days a week do you exercise enough to make your heart beat faster?: 3 or less  How many days per week do you miss taking your medication?: 0  What is the reason for your visit today?: water on left knee  When did your symptoms begin?: More than a month  How would you describe these symptoms?: Mild  Are your symptoms:: Improving  Have you had these symptoms before?: Yes  Have you tried or received treatment for these symptoms before?: Yes  Did that treatment work? : No

## 2022-05-12 ENCOUNTER — ANCILLARY PROCEDURE (OUTPATIENT)
Dept: VASCULAR ULTRASOUND | Facility: CLINIC | Age: 67
End: 2022-05-12
Attending: SURGERY
Payer: COMMERCIAL

## 2022-05-12 ENCOUNTER — HOSPITAL ENCOUNTER (OUTPATIENT)
Dept: CT IMAGING | Facility: HOSPITAL | Age: 67
Discharge: HOME | End: 2022-05-12
Attending: SURGERY
Payer: COMMERCIAL

## 2022-05-12 ENCOUNTER — OFFICE VISIT (OUTPATIENT)
Dept: VASCULAR SURGERY | Facility: CLINIC | Age: 67
End: 2022-05-12
Attending: SURGERY
Payer: COMMERCIAL

## 2022-05-12 VITALS
HEART RATE: 88 BPM | SYSTOLIC BLOOD PRESSURE: 140 MMHG | DIASTOLIC BLOOD PRESSURE: 80 MMHG | RESPIRATION RATE: 16 BRPM | TEMPERATURE: 99.1 F

## 2022-05-12 DIAGNOSIS — I65.22 STENOSIS OF LEFT CAROTID ARTERY: ICD-10-CM

## 2022-05-12 DIAGNOSIS — I73.9 PVD (PERIPHERAL VASCULAR DISEASE) (H): ICD-10-CM

## 2022-05-12 DIAGNOSIS — I73.9 PVD (PERIPHERAL VASCULAR DISEASE) (H): Primary | ICD-10-CM

## 2022-05-12 LAB
CREAT BLD-MCNC: 1.1 MG/DL (ref 0.7–1.3)
GFR SERPL CREATININE-BSD FRML MDRD: >60 ML/MIN/1.73M2

## 2022-05-12 PROCEDURE — 75635 CT ANGIO ABDOMINAL ARTERIES: CPT

## 2022-05-12 PROCEDURE — 99213 OFFICE O/P EST LOW 20 MIN: CPT | Performed by: SURGERY

## 2022-05-12 PROCEDURE — 93924 LWR XTR VASC STDY BILAT: CPT | Mod: 26 | Performed by: SURGERY

## 2022-05-12 PROCEDURE — 250N000011 HC RX IP 250 OP 636: Performed by: SURGERY

## 2022-05-12 PROCEDURE — 93924 LWR XTR VASC STDY BILAT: CPT

## 2022-05-12 PROCEDURE — 82565 ASSAY OF CREATININE: CPT

## 2022-05-12 RX ORDER — IOPAMIDOL 755 MG/ML
100 INJECTION, SOLUTION INTRAVASCULAR ONCE
Status: COMPLETED | OUTPATIENT
Start: 2022-05-12 | End: 2022-05-12

## 2022-05-12 RX ADMIN — IOPAMIDOL 100 ML: 755 INJECTION, SOLUTION INTRAVENOUS at 12:55

## 2022-05-12 ASSESSMENT — PAIN SCALES - GENERAL: PAINLEVEL: NO PAIN (0)

## 2022-05-12 NOTE — PROGRESS NOTES
[facility]        Patient is here for a  follow up  to discuss Peripheral artery disease (PAD). Symptoms include claudicaton: left calf at distance of 1/4 mile in left lower extremity.  [place], no pain.    Pt is currently taking Pletal, asa, statin      The provider has been notified that the patient has no concerns.     Questions patient would like addressed today are: N/A.    Refills are needed: No    Has homecare services and agency name:  Abbie Tompkins RN

## 2022-05-12 NOTE — PROGRESS NOTES
VASCULAR SURGERY OUTPATIENT CONSULT OR VISIT   VASCULAR SURGEON: Adry Mandel MD    LOCATION:  [facility]    Dung Negrete   Medical Record #:  [id number]  YOB: 1955  Age:  67 year old     Date of Service: 5/12/2022    PRIMARY CARE PROVIDER: Elmo Crump      Reason for consultation: Evaluation of vascular disease    IMPRESSION: Patient with claudication who underwent right leg SFA stenting by me in May 2020 after restenosis from simple balloon angioplasty performed by me prior to that.  Had left SFA angioplasty in 2005 which was successful but more recently has been having progressive claudication on the left side.  On today's testing was only able to walk for 7 minutes with left calf pain starting at 3 minutes.  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  Clinically patient feels that he is doing well and able to do activities he needs to do despite the claudication.  Medically optimized on cilostazol and aspirin therapy in addition to statin.    RECOMMENDATION: Patient overall doing very well.  Very low risk of progression given that he is medically optimized.  At this point not interested in left leg repeat angio intervention.  Support this decision.  We will see him back in 6 months time with repeat De La Paz Newman testing and right leg arterial duplex to follow R stent.  He will also need carotid duplex at that time given his history of carotid occlusion.    HPI:  Dung Negrete is a 67 year old male who was seen today for follow-up of his PAD.  This a gentleman who has a left leg angio plasty in 2005 for whom we have done right leg and intervention including most recently SFA stenting and 2020.  Since that last time he is doing quite well.  He does get left calf claudication when going up hills but feels that this is manageable.  Compliant with his cilostazol and therefore no longer needing Plavix.  Also taking aspirin and  statin.  Does not smoke.  No other major health issues.    Patient has known carotid disease but we did not evaluate at this time and he is not symptomatic from it.    Does bring up that he has some mild erectile dysfunction but has sildenafil that he uses as needed.  No other new health issues.    PHH:    Past Medical History:   Diagnosis Date     Cellulitis and abscess of leg 8/31/2006     Dyspnea 5/27/2015     Enthesopathy of knee 8/31/2006     GERD (gastroesophageal reflux disease) 1/8/2017     Health examination of defined subpopulation 12/27/2005     History of total hip arthroplasty, left 12/28/2018     Mixed hyperlipidemia     Created by Conversion      Polyarthralgia 5/17/2017     Primary osteoarthritis involving multiple joints 5/17/2017     Sprain of neck     Created by Conversion         Past Surgical History:   Procedure Laterality Date     ANGIOPLASTY Left 2007     IR EXTREMITY ANGIOGRAM BILATERAL  4/17/2007     IR EXTREMITY ANGIOGRAM RIGHT  8/23/2019     IR EXTREMITY ANGIOGRAM RIGHT  5/22/2020     IR EXTREMITY ANGIOGRAM RIGHT  8/23/2019     IR EXTREMITY ANGIOGRAM RIGHT  5/22/2020     TOTAL HIP ARTHROPLASTY Left 12/28/2019     TOTAL HIP ARTHROPLASTY Right        ALLERGIES:  Patient has no known allergies.    MEDS:    Current Outpatient Medications:      allopurinol (ZYLOPRIM) 300 MG tablet, Take 1 tablet (300 mg) by mouth daily, Disp: 90 tablet, Rfl: 3     amLODIPine (NORVASC) 10 MG tablet, Take 1 tablet (10 mg) by mouth daily, Disp: 90 tablet, Rfl: 3     aspirin 81 MG EC tablet, [ASPIRIN 81 MG EC TABLET] Take 81 mg by mouth daily., Disp: , Rfl:      celecoxib (CELEBREX) 100 MG capsule, TAKE 1 CAPSULE BY MOUTH EVERY DAY, Disp: 90 capsule, Rfl: 2     cholecalciferol, vitamin D3, (VITAMIN D3) 400 unit cap, [CHOLECALCIFEROL, VITAMIN D3, (VITAMIN D3) 400 UNIT CAP] Take 1 capsule by mouth daily., Disp: , Rfl:      cilostazol (PLETAL) 100 MG tablet, TAKE 1 TABLET BY MOUTH TWICE A DAY, Disp: 180 tablet, Rfl:  3     LANsoprazole (PREVACID) 30 MG DR capsule, TAKE 1 CAPSULE BY MOUTH EVERY DAY, Disp: 90 capsule, Rfl: 3     lisinopril (ZESTRIL) 20 MG tablet, Take 1 tablet (20 mg) by mouth daily, Disp: 90 tablet, Rfl: 3     Multiple Vitamin (MULTI VITAMIN) TABS, Take 1 tablet by mouth, Disp: , Rfl:      MULTIVITAMIN ORAL, [MULTIVITAMIN ORAL] Take 1 tablet by mouth daily., Disp: , Rfl:      rosuvastatin (CRESTOR) 40 MG tablet, [ROSUVASTATIN (CRESTOR) 40 MG TABLET] Take 1 tablet (40 mg total) by mouth at bedtime., Disp: 90 tablet, Rfl: 3     sildenafiL (VIAGRA) 50 MG tablet, [SILDENAFIL (VIAGRA) 50 MG TABLET] Take 1 tablet (50 mg total) by mouth as needed for erectile dysfunction., Disp: 30 tablet, Rfl: 1  No current facility-administered medications for this visit.    SOCIAL HABITS:    History   Smoking Status     Former Smoker     Packs/day: 1.00     Years: 39.00     Types: Cigarettes, Cigarettes     Quit date: 6/1/2014   Smokeless Tobacco     Former User     Quit date: 2/4/2005     Social History    Substance and Sexual Activity      Alcohol use: Yes        Comment: Alcoholic Drinks/day: occasional on weekends      History   Drug Use     Types: Marijuana       FAMILY HISTORY:    Family History   Problem Relation Age of Onset     No Known Problems Mother      Heart Disease Father      Hypertension Father      Diabetes Sister      Heart Disease Brother      Diabetes Brother      Coronary Artery Disease Brother      Diabetes Sister        REVIEW OF SYSTEMS:    A 12 point ROS was reviewed and except for what is listed in the HPI above, all others are negative    PE:  BP (!) 140/80   Pulse 88   Temp 99.1  F (37.3  C) (Oral)   Resp 16   Wt Readings from Last 1 Encounters:   04/01/22 217 lb 12.8 oz (98.8 kg)     There is no height or weight on file to calculate BMI.    EXAM:  GENERAL: This is a well-developed 67 year old male who appears his stated age  EYES: Grossly normal.  MOUTH: Buccal mucosa normal   MUSCULOSKELETAL: Grossly  normal and both lower extremities are intact.  HEME/LYMPH: No lymphedema  NEUROLOGIC: Focally intact, Alert and oriented x 3.   PSYCH: appropriate affect  INTEGUMENT: No open lesions or ulcers        DIAGNOSTIC STUDIES:     Images:  No results found.    I personally reviewed the images and my interpretation is that his De La Paz Newman test shows that he is only able to walk for 3 minutes before he develops left calf pain and after that only for a total of 7 minutes.  ABIs are near normal at rest bilaterally.  CTA demonstrates severe SFA stenosis of close to 70% on the left side.  Right side is widely patent SFA stent..    LABS:      Sodium   Date Value Ref Range Status   09/07/2021 142 136 - 145 mmol/L Final   08/25/2020 143 136 - 145 mmol/L Final   06/18/2019 142 136 - 145 mmol/L Final     Urea Nitrogen   Date Value Ref Range Status   09/07/2021 17 8 - 22 mg/dL Final   08/25/2020 18 8 - 22 mg/dL Final   06/18/2019 16 8 - 22 mg/dL Final     Hemoglobin   Date Value Ref Range Status   05/22/2020 14.4 14.0 - 18.0 g/dL Final   08/23/2019 15.6 14.0 - 18.0 g/dL Final   08/13/2019 15.4 14.0 - 18.0 g/dL Final     Platelet Count   Date Value Ref Range Status   05/22/2020 154 140 - 440 thou/uL Final   08/23/2019 161 140 - 440 thou/uL Final   08/13/2019 150 140 - 440 thou/uL Final     INR   Date Value Ref Range Status   05/22/2020 1.01 0.90 - 1.10 Final   08/23/2019 1.00 0.90 - 1.10 Final         Adry Mandel MD, MD  VASCULAR SURGERY

## 2022-06-21 DIAGNOSIS — E78.2 MIXED HYPERLIPIDEMIA: ICD-10-CM

## 2022-06-21 RX ORDER — ROSUVASTATIN CALCIUM 40 MG/1
40 TABLET, COATED ORAL AT BEDTIME
Qty: 90 TABLET | Refills: 3 | Status: SHIPPED | OUTPATIENT
Start: 2022-06-21 | End: 2023-05-17

## 2022-06-21 NOTE — TELEPHONE ENCOUNTER
Last office visit on 04/01/2022.    Last refill on 06/11/2021.    Last lipid on 09/07/2021:  Cholesterol 143   <=199 mg/dL Final   Triglycerides 168   High   <=149 mg/dL Final   Direct Measure HDL 53   >=40 mg/dL Final       Kerry PHILIPPE RN

## 2022-08-05 DIAGNOSIS — M10.9 GOUTY ARTHROPATHY: ICD-10-CM

## 2022-08-05 RX ORDER — ALLOPURINOL 300 MG/1
TABLET ORAL
Qty: 90 TABLET | Refills: 3 | OUTPATIENT
Start: 2022-08-05

## 2022-08-15 DIAGNOSIS — K21.9 GASTROESOPHAGEAL REFLUX DISEASE WITHOUT ESOPHAGITIS: ICD-10-CM

## 2022-08-16 RX ORDER — LANSOPRAZOLE 30 MG/1
CAPSULE, DELAYED RELEASE ORAL
Qty: 90 CAPSULE | Refills: 2 | Status: SHIPPED | OUTPATIENT
Start: 2022-08-16 | End: 2023-02-08

## 2022-08-16 NOTE — TELEPHONE ENCOUNTER
"Last Written Prescription Date:  8/12/21  Last Fill Quantity: 90,  # refills: 3   Last office visit provider:  4/1/22     Requested Prescriptions   Pending Prescriptions Disp Refills     LANsoprazole (PREVACID) 30 MG DR capsule [Pharmacy Med Name: LANSOPRAZOLE 30MG DR CAPSULE] 270 capsule 0     Sig: TAKE 1 CAPSULE BY MOUTH ONE TIME DAILY       PPI Protocol Passed - 8/16/2022 10:18 AM        Passed - Not on Clopidogrel (unless Pantoprazole ordered)        Passed - No diagnosis of osteoporosis on record        Passed - Recent (12 mo) or future (30 days) visit within the authorizing provider's specialty     Patient has had an office visit with the authorizing provider or a provider within the authorizing providers department within the previous 12 mos or has a future within next 30 days. See \"Patient Info\" tab in inbasket, or \"Choose Columns\" in Meds & Orders section of the refill encounter.              Passed - Medication is active on med list        Passed - Patient is age 18 or older             Brown Dawson RN 08/16/22 10:18 AM  "

## 2022-08-19 NOTE — TELEPHONE ENCOUNTER
Spoke to patient's wife.  Cancel procedure for Friday citing limited PPE for staff in [place] at  environment.  They are fully understanding.  We will reach out to them once things are more settled.   no gross abnormalities

## 2022-09-25 ENCOUNTER — HEALTH MAINTENANCE LETTER (OUTPATIENT)
Age: 67
End: 2022-09-25

## 2022-11-29 DIAGNOSIS — I73.9 PVD (PERIPHERAL VASCULAR DISEASE) (H): ICD-10-CM

## 2022-11-29 RX ORDER — CILOSTAZOL 100 MG/1
TABLET ORAL
Qty: 180 TABLET | Refills: 3 | Status: SHIPPED | OUTPATIENT
Start: 2022-11-29 | End: 2023-11-13

## 2022-11-29 NOTE — TELEPHONE ENCOUNTER
"Routing refill request to provider for review/approval because:  Labs not current:  hgb   plts    Last Written Prescription Date:  2/10/22  Last Fill Quantity: 180,  # refills: 3   Last office visit provider:  4/1/22     Requested Prescriptions   Pending Prescriptions Disp Refills     cilostazol (PLETAL) 100 MG tablet [Pharmacy Med Name: CILOSTAZOL 100MG TABLET] 180 tablet 3     Sig: TAKE 1 TABLET BY MOUTH TWICE A DAY       Platelet Inhibitors Failed - 11/29/2022  1:02 AM        Failed - Normal HGB on file in past 12 months     Recent Labs   Lab Test 05/22/20  0739   HGB 14.4               Failed - Normal Platelets on file in past 12 months     Recent Labs   Lab Test 05/22/20  0739                  Passed - Recent (12 mo) or future (30 days) visit within the authorizing provider's specialty     Patient has had an office visit with the authorizing provider or a provider within the authorizing providers department within the previous 12 mos or has a future within next 30 days. See \"Patient Info\" tab in inbasket, or \"Choose Columns\" in Meds & Orders section of the refill encounter.              Passed - Medication is active on med list        Passed - Patient is age 18 or older        Passed - Normal serum creatinine on file in past 12 months     Recent Labs   Lab Test 05/12/22  1231   CR 1.1       Ok to refill medication if creatinine is low               Bridgette Amador, RN 11/29/22 5:25 PM  "

## 2022-12-27 DIAGNOSIS — M10.9 GOUTY ARTHROPATHY: ICD-10-CM

## 2022-12-28 NOTE — TELEPHONE ENCOUNTER
"Routing refill request to provider for review/approval because:  Labs not current:  Cbc, alt    Last Written Prescription Date:  2/10/22  Last Fill Quantity: 90,  # refills: 3   Last office visit provider:  4/1/22     Requested Prescriptions   Pending Prescriptions Disp Refills     allopurinol (ZYLOPRIM) 300 MG tablet [Pharmacy Med Name: ALLOPURINOL 300MG TABLET] 90 tablet 3     Sig: TAKE 1 TABLET BY MOUTH ONE TIME DAILY       Gout Agents Protocol Failed - 12/27/2022  1:26 PM        Failed - CBC on file in past 12 months     Recent Labs   Lab Test 05/22/20  0739 08/23/19  0921 08/13/19  0909   WBC  --   --  6.6   RBC  --   --  5.03   HGB 14.4   < > 15.4   HCT  --   --  44.7      < > 150    < > = values in this interval not displayed.                 Failed - ALT on file in past 12 months     Recent Labs   Lab Test 09/07/21  1220   ALT 25             Failed - Has Uric Acid on file in past 12 months and value is less than 6     Recent Labs   Lab Test 09/07/21  1220   URIC 6.1     If level is 6mg/dL or greater, ok to refill one time and refer to provider.           Passed - Recent (12 mo) or future (30 days) visit within the authorizing provider's specialty     Patient has had an office visit with the authorizing provider or a provider within the authorizing providers department within the previous 12 mos or has a future within next 30 days. See \"Patient Info\" tab in inbasket, or \"Choose Columns\" in Meds & Orders section of the refill encounter.              Passed - Medication is active on med list        Passed - Patient is age 18 or older        Passed - Normal serum creatinine on file in the past 12 months     Recent Labs   Lab Test 05/12/22  1231   CR 1.1       Ok to refill medication if creatinine is low               Bridgette Amador, RN 12/28/22 5:08 PM  "

## 2022-12-29 RX ORDER — ALLOPURINOL 300 MG/1
TABLET ORAL
Qty: 90 TABLET | Refills: 3 | Status: SHIPPED | OUTPATIENT
Start: 2022-12-29 | End: 2023-11-13

## 2023-01-09 ENCOUNTER — ANCILLARY PROCEDURE (OUTPATIENT)
Dept: VASCULAR ULTRASOUND | Facility: CLINIC | Age: 68
End: 2023-01-09
Attending: SURGERY
Payer: COMMERCIAL

## 2023-01-09 DIAGNOSIS — I73.9 PVD (PERIPHERAL VASCULAR DISEASE) (H): ICD-10-CM

## 2023-01-09 DIAGNOSIS — I65.22 STENOSIS OF LEFT CAROTID ARTERY: ICD-10-CM

## 2023-01-09 PROCEDURE — 93880 EXTRACRANIAL BILAT STUDY: CPT | Mod: 26 | Performed by: SURGERY

## 2023-01-09 PROCEDURE — 93923 UPR/LXTR ART STDY 3+ LVLS: CPT | Mod: 26 | Performed by: SURGERY

## 2023-01-09 PROCEDURE — 93880 EXTRACRANIAL BILAT STUDY: CPT

## 2023-01-09 PROCEDURE — 93926 LOWER EXTREMITY STUDY: CPT | Mod: 26 | Performed by: SURGERY

## 2023-01-09 PROCEDURE — 93923 UPR/LXTR ART STDY 3+ LVLS: CPT

## 2023-01-09 PROCEDURE — 93926 LOWER EXTREMITY STUDY: CPT | Mod: RT

## 2023-01-16 ENCOUNTER — VIRTUAL VISIT (OUTPATIENT)
Dept: VASCULAR SURGERY | Facility: CLINIC | Age: 68
End: 2023-01-16
Attending: SURGERY
Payer: COMMERCIAL

## 2023-01-16 DIAGNOSIS — R09.89 OTHER SPECIFIED SYMPTOMS AND SIGNS INVOLVING THE CIRCULATORY AND RESPIRATORY SYSTEMS: ICD-10-CM

## 2023-01-16 DIAGNOSIS — I73.9 PVD (PERIPHERAL VASCULAR DISEASE) (H): Primary | ICD-10-CM

## 2023-01-16 PROCEDURE — 99213 OFFICE O/P EST LOW 20 MIN: CPT | Mod: 95 | Performed by: SURGERY

## 2023-01-16 PROCEDURE — G0463 HOSPITAL OUTPT CLINIC VISIT: HCPCS | Mod: PN,GT | Performed by: SURGERY

## 2023-01-16 NOTE — PROGRESS NOTES
Shabbir is a 68 year old who is being evaluated via a billable video visit.      Would like to know if he can get a 200mg Tablet of Cilostazol vs taking 2 a day of the 100mg (no refill needed at this time would like for next one)     How would you like to obtain your AVS? MyChart  If the video visit is dropped, the invitation should be resent by: Text to cell phone: [phone]  Will anyone else be joining your video visit?  Jaylin Phillips states is currently in the [facility]: Yes          Video-Visit Details    Type of service:  Video Visit   Video Start Time: 822  Video End Time:828    Originating Location (pt. Location): Home    Distant Location (provider location):  On-site  Platform used for Video Visit: MauWell

## 2023-01-16 NOTE — PATIENT INSTRUCTIONS
Sonia Razo,    Thank you for entrusting your care with us today. After your visit today with MD Adry Mandel this is the plan that was discussed at your appointment.    Continue Pletal dosage     You will follow-up in 1 year with the same imaging     We will call you in 3-6 months to schedule    I am including additional information on these things and our contact information if you have any questions or concerns.   Please do not hesitate to reach out to us if you felt we did not answer your questions or you are unsure of the treatment plan after your visit today. Our number is [phone].Thank you for trusting us with your care.         Again thank you for your time.     Cyndi Macias RN

## 2023-01-16 NOTE — NURSING NOTE
Chief Complaint   Patient presents with     Follow Up       Patient confirms medications and allergies are accurate via patients echeck in completion, and or denies any changes since last reviewed/verified.       Chiara Almaguer, Virtual Facilitator

## 2023-01-16 NOTE — PROGRESS NOTES
VASCULAR SURGERY OUTPATIENT VIRTUAL CONSULT OR VISIT   VASCULAR SURGEON: Adry Mandel MD    LOCATION:  Virtual visit done using video    Dung Negrete   Medical Record #:  [id number]  YOB: 1955  Age:  68 year old     Date of Service: 1/16/2023    PRIMARY CARE PROVIDER: Elmo Crump      Reason for consultation: Reevaluation of carotid and peripheral vascular disease    IMPRESSION: Patient clinically asymptomatic from his carotid disease with known left carotid occlusion and no stenosis on the right side.  Clinically doing well with very manageable claudication of the left leg and widely patent right SFA stent.  Remote history of angioplasty of the left SFA in 2005.  On cilostazol, statin, and aspirin.    RECOMMENDATION: Clinically doing very well.  We will see him back in a years time with repeat De La Paz Newman testing, right leg arterial duplex, and carotid duplex.    HPI:  Dung Negrete is a 68 year old male who was evaluated today for his PAD and carotid disease.  This a gentleman who I have treated first with simple balloon angioplasty of his right SFA and then a small stent for lifestyle limiting claudication.  Prior to me meeting him, in 2005, he had balloon angioplasty of his left SFA for the same problem.  Is been doing really well with this clinically.  A year ago we did a De La Paz Newman test and he did develop left leg claudication with exertion but he tells us that he is able to manage very well with his day-to-day activities.  Since I last saw him this continues to be the case.  He is able to do all his daily activities including shoveling of snow without significant limitation.    The patient has a known incidentally discovered left carotid occlusion as well.  We will continue to follow him because of this from a carotid standpoint but he has never had any symptoms of TIA or amaurosis fugax.  He continues to do well from the standpoint as well.    Patient is medically optimized  on statin aspirin and cilostazol therapy.  He tolerated the cilostazol without side effects.  He actually takes 200 mg in the morning rather than taking it 100 mg twice daily.  The asked therefore if he could just get a 200 mg tablet.  I told him that I do not think we made one but I would be happy to prescribe it that way the next time we had to renew it.  I want to keep him on the cilostazol as he knows because of his known improvements in stent patency over the long run.    PHH:    Past Medical History:   Diagnosis Date     Cellulitis and abscess of leg 8/31/2006     Dyspnea 5/27/2015     Enthesopathy of knee 8/31/2006     GERD (gastroesophageal reflux disease) 1/8/2017     Health examination of defined subpopulation 12/27/2005     History of total hip arthroplasty, left 12/28/2018     Mixed hyperlipidemia     Created by Conversion      Polyarthralgia 5/17/2017     Primary osteoarthritis involving multiple joints 5/17/2017     Sprain of neck     Created by Conversion         Past Surgical History:   Procedure Laterality Date     ANGIOPLASTY Left 2007     IR EXTREMITY ANGIOGRAM BILATERAL  4/17/2007     IR EXTREMITY ANGIOGRAM RIGHT  8/23/2019     IR EXTREMITY ANGIOGRAM RIGHT  5/22/2020     IR EXTREMITY ANGIOGRAM RIGHT  8/23/2019     IR EXTREMITY ANGIOGRAM RIGHT  5/22/2020     TOTAL HIP ARTHROPLASTY Left 12/28/2019     TOTAL HIP ARTHROPLASTY Right        ALLERGIES:  Patient has no known allergies.    MEDS:    Current Outpatient Medications:      allopurinol (ZYLOPRIM) 300 MG tablet, TAKE 1 TABLET BY MOUTH ONE TIME DAILY, Disp: 90 tablet, Rfl: 3     amLODIPine (NORVASC) 10 MG tablet, TAKE 1 TABLET BY MOUTH ONE TIME DAILY, Disp: 90 tablet, Rfl: 3     aspirin 81 MG EC tablet, [ASPIRIN 81 MG EC TABLET] Take 81 mg by mouth daily., Disp: , Rfl:      celecoxib (CELEBREX) 100 MG capsule, TAKE 1 CAPSULE BY MOUTH ONE TIME DAILY, Disp: 90 capsule, Rfl: 3     cholecalciferol, vitamin D3, (VITAMIN D3) 400 unit cap,  [CHOLECALCIFEROL, VITAMIN D3, (VITAMIN D3) 400 UNIT CAP] Take 1 capsule by mouth daily., Disp: , Rfl:      cilostazol (PLETAL) 100 MG tablet, TAKE 1 TABLET BY MOUTH TWICE A DAY, Disp: 180 tablet, Rfl: 3     LANsoprazole (PREVACID) 30 MG DR capsule, TAKE 1 CAPSULE BY MOUTH ONE TIME DAILY, Disp: 90 capsule, Rfl: 2     lisinopril (ZESTRIL) 20 MG tablet, Take 1 tablet (20 mg) by mouth daily, Disp: 90 tablet, Rfl: 3     Multiple Vitamin (MULTI VITAMIN) TABS, Take 1 tablet by mouth, Disp: , Rfl:      MULTIVITAMIN ORAL, [MULTIVITAMIN ORAL] Take 1 tablet by mouth daily., Disp: , Rfl:      rosuvastatin (CRESTOR) 40 MG tablet, Take 1 tablet (40 mg) by mouth At Bedtime, Disp: 90 tablet, Rfl: 3     sildenafiL (VIAGRA) 50 MG tablet, [SILDENAFIL (VIAGRA) 50 MG TABLET] Take 1 tablet (50 mg total) by mouth as needed for erectile dysfunction., Disp: 30 tablet, Rfl: 1    SOCIAL HABITS:    History   Smoking Status     Former     Packs/day: 1.00     Years: 39.00     Types: Cigarettes, Cigarettes     Quit date: 6/1/2014   Smokeless Tobacco     Former     Quit date: 2/4/2005     Social History    Substance and Sexual Activity      Alcohol use: Yes        Comment: Alcoholic Drinks/day: occasional on weekends      History   Drug Use     Types: Marijuana       FAMILY HISTORY:    Family History   Problem Relation Age of Onset     No Known Problems Mother      Heart Disease Father      Hypertension Father      Diabetes Sister      Heart Disease Brother      Diabetes Brother      Coronary Artery Disease Brother      Diabetes Sister        REVIEW OF SYSTEMS:    A 12 point ROS was reviewed and except for what is listed in the HPI above, all others are negative    PE:  There were no vitals taken for this visit.  Wt Readings from Last 1 Encounters:   04/01/22 217 lb 12.8 oz (98.8 kg)     There is no height or weight on file to calculate BMI.    EXAM:  EXAM LIMITED AS THIS IS A VIRTUAL VISIT with video  GENERAL: This is a well-developed 68 year old  male who appears his stated age  EYES: Grossly normal.  MOUTH: Buccal mucosa normal   MUSCULOSKELETAL: Grossly normal and both lower extremities are intact.  HEME/LYMPH: No lymphedema  NEUROLOGIC: Focally intact, Alert and oriented x 3.   PSYCH: appropriate affect  INTEGUMENT: No open lesions or ulcers            DIAGNOSTIC STUDIES:     Images:  US Low Ext Arterial Doppler  wo Ex    Result Date: 1/10/2023  Table formatting from the original result was not included. BILATERAL RESTING ANKLE-BRACHIAL INDICES (MACK'S) (Date: 01/09/23) Indication: SURVEILLANCE MACK'S: PAD, HX: RIGHT SUPERFICIAL FEMORAL ARTERY MID THIGH STENT DONE 5/22/2020. Previous: 5/12/2022 History: Previous Smoker, Hypertension, Hyperlipidemia, TIA/Stroke, PAD, Angioplasty and Vascular Surgery  Resting MACK's          Right: mmHg Index     Brachial: 151  Ankle-(PT): 186 1.22 Ankle-(DP): 124 0.82          Digit: 101 0.66               Left: mmHg Index     Brachial: 152  Ankle-(PT): 165 1.09 Ankle-(DP): 125 0.82          Digit: 110 0.72 Resting ankle-brachial index of 1.22 on the right. Toe Pressures of 101 mmHg and TBI of 0.66 Resting ankle-brachial index of 1.09 on the left. Toe Pressures of 110 mmHg and TBI of 0.72  VPR WAVEFORMS: The right volume plethysmography waveforms are normal at the lower thigh level, normal at the upper calf level and mildly abnormal at the ankle. The left volume plethysmography waveforms are normal at the lower thigh level, normal at the upper calf level and mildly abnormal at the ankle.  Impression:  1. RIGHT LOWER EXTREMITY: MACK is Normal with multiphasic waveforms with an MACK of 1.22. Toe Pressures are Normal and adequate for wound healing with toe pressures of 101 mmHg. 2. LEFT LOWER EXTREMITY: MACK is Normal with multiphasic waveforms with an MACK of 1.09. Toe Pressures are Normal and adequate for wound healing with toe pressures of 110 mmHg. Reference: Wound classification Grade MACK Ankle Systolic Pressure Toe Pressures 0  > 0.80 > 100 mmHg > 60 mmHg 1 0.6 - 0.79 70 - 100 mmHg 40 - 59 mmHg 2 0.4 - 0.59 50-70 mmHg 30 - 39 mmHg 3 < 0.39 < 50 mmHg < 30 mmHg Digit Pressures DBI Disease Category > 0.70 Normal < 0.70 Abnormal > 30 mmHg Potential wound healing < 30 mmHg Impaired wound healing Ankle Brachial Pressures MACK Disease Category > 1.3  Likely vessel calcification with monophasic waveforms, non-diagnostic 0.95-1.30 Normal with multiphasic waveforms 0.50-0.95 Single level disease 0.30-0.50 Multilevel disease < 0.30 Critical limb ischema Volume Plethysmography Recording (VPR) at all levels Normal Sharp systolic peak, fast upstroke, prominent dicrotic notch in wave Mild Sharp systolic peak, fast upstroke, absent dicrotic notch in wave Moderate Flattened systolic peak, slowed upstroke, absent dicrotic notch inwave Severe amplitude wave with = upslope and down slope Occluded Flat Line     US Carotid Bilateral    Result Date: 1/9/2023  Table formatting from the original result was not included. BILATERAL CAROTID ULTRASOUND (Date: 01/09/23) Indication: SURVEILLANCE RIGHT ICA STENOSIS. HX: LEFT ICA OCCLUSION. Previous: 12/9/2021 Symptoms: Bruit, Hypertension, Stroke/TIA and Previous Smoker TECHNIQUE: Duplex exam performed utilizing 2D gray-scale imaging, Doppler interrogation with color-flow and spectral waveform analysis. Right Velocity  Chart (cm/sec) Location Right DISTAL CCA-PS 71 DISTAL CCA- ED 16 PROX ICA-PS 66 PROX ICA-ED 18 ECA- ECA-ED 12 Vertebral- Antegrade 83 Subclavian A- Biphasic 86 Ratio ICA/CCA-PS 0.93 Ratio ICA/CCA-ED 1.13 Left Velocity  Chart (cm/sec) Location Left DISTAL CCA-PS 79 DISTAL CCA- ED 8 PROX ICA-PS OCCLUDED  PROX ICA-ED OCCLUDED  ECA- ECA-ED 20 Vertebral- Antegrade 74 Subclavian A- Triphasic 130 Ratio ICA/CCA-PS N/A Ratio ICA/CCA-ED N/A FINDINGS: RIGHT: There is predominantly echogenic  atheromatous plaque.  LEFT: There is predominantly echogenic atheromatous plaque.  RIGHT: Vertebral artery and  subclavian artery waveforms are antegrade. LEFT: Vertebral artery and subclavian artery waveforms are antegrade. Impression:  1. Less than 50% diameter stenosis of the right ICA relative to the proximal ICA diameter. 2. Occlusion of the left ICA relative to the proximal ICA diameter. Evaluation based on velocities and NASCET criteria Category PSV (cm/s)  Plaque Imaging EDV (cm/s)  ICA/CCA Ratio Normal,  <125  None <40 <2 Mild <50% <125 < than 50% DR stenosis <40 <2 Moderate Disease 50-69% 125-230 > than 50% DR stenosis  2.0-4.0 Severe->70% but less than near occlusion >230 > than 50% DR stenosis >100 >4.0 Near Occlusion May be low or undetectable Visible, extensive Variable Variable Occlusion No Flow Visible with no detectable lumen N/A N/A Plaquetype Description Type 1 Uniformly echolucent Type 2 Predominantly echolucent >50% Type 3 Predominantly echogenic >50% Type 4 Uniformly echogenic Type 5 Unclassified due to poor visualization Ulcer Visible Ulcerative Plaque     US Lower Extremity Arterial Duplex Right    Result Date: 1/10/2023  Table formatting from the original result was not included. Arterial Duplex Ultrasound (Date: 01/09/23) Lower Extremity Artery Evaluation Indication: SURVEILLANCE RIGHT LEG ARTERIAL:  PAD, HX: RIGHT SUPERFICIAL FEMORAL ARTERY MID THIGH STENT DONE 5/22/2020. Previous: 5/12/2022 History: Previous Smoker, Hypertension, Hyperlipidemia, TIA/Stroke, PAD, Angioplasty and Vascular Surgery Angioplasty Date: 5/22/2020 Technique: Duplex imaging is performed utilizing gray-scale, two-dimensional images, and color-flow imaging. Doppler waveform analysis and spectral Doppler imaging is also performed.  LOWER EXTREMITY ARTERIAL DUPLEX EXAM WITH WAVEFORMS  Right Leg:(cm/s) Location Velocities Waveforms EIA   118  B CFA   106  T PFA   162  T SFA Proximal   98 /172 T SFA Mid   180/ 164/ 158 B SFA Distal   146  T Popliteal Artery   45 / 67  T PTA   56   B DAVIS   47  B DPA   35  B Waveforms:  T=Triphasic, M=Monophasic, B=Biphasic Left Leg:(cm/s) Location: Velocities Waveforms PTA:   53   T DAVIS:   43  B DPA:   35  B Waveforms: T=Triphasic, M=Monophasic, B=Biphasic Stent Velocities: Stent 1: Right Leg Stent location: SFA MID      Velocity    Waveform Inflow Artery: SFA PROX.        Proximal to Stent:   172   T Proximal Stent:   180  T Mid Stent:   164  T Distal Stent:   158  B Distal to Stent:   146  B Outflow Artery: SFA DIST.       Impression: Right Lower Extremity: Triphasic flow in common femoral artery suggesting no inflow disease.  Patent right SFA stent with moderately elevated velocities throughout and with unchanged quality of arterial waveforms.  Otherwise, there is no evidence of hemodynamically significant stenosis in the right lower extremity Left Lower Extremity: Not fully examined but there is a triphasic flow in tibial vessels suggesting lack of more proximal disease Reference: Category Normal 1-19% 20-49% 50-99% Occluded PSV <160 cm/sec without spectral broadening <160 cm/sec with spectral broadening Increased Increased Absent Flow Ratio N/A N/A < 2.0 >2.0 N/A Post-Stenotic Turbulence No No No Yes N/A       I personally reviewed the images and my interpretation is that his ABIs are normal bilaterally.  Duplex shows a patent right SFA stent without important narrowing.  Carotid duplex continues to show left carotid occlusion but a widely patent right internal carotid artery..    LABS:      Sodium   Date Value Ref Range Status   09/07/2021 142 136 - 145 mmol/L Final   08/25/2020 143 136 - 145 mmol/L Final   06/18/2019 142 136 - 145 mmol/L Final     Urea Nitrogen   Date Value Ref Range Status   09/07/2021 17 8 - 22 mg/dL Final   08/25/2020 18 8 - 22 mg/dL Final   06/18/2019 16 8 - 22 mg/dL Final     Hemoglobin   Date Value Ref Range Status   05/22/2020 14.4 14.0 - 18.0 g/dL Final   08/23/2019 15.6 14.0 - 18.0 g/dL Final   08/13/2019 15.4 14.0 - 18.0 g/dL Final     Platelet Count   Date Value  Ref Range Status   05/22/2020 154 140 - 440 thou/uL Final   08/23/2019 161 140 - 440 thou/uL Final   08/13/2019 150 140 - 440 thou/uL Final     INR   Date Value Ref Range Status   05/22/2020 1.01 0.90 - 1.10 Final   08/23/2019 1.00 0.90 - 1.10 Final     This is a video visit with the start time of 8:22 AM and end time of end 8:28 AM.  20 minutes spent on the day of encounter doing chart review, history and exam, documentation, and further activities as noted.     Adry Mandel MD, MD  VASCULAR SURGERY

## 2023-02-04 ENCOUNTER — HEALTH MAINTENANCE LETTER (OUTPATIENT)
Age: 68
End: 2023-02-04

## 2023-02-05 ASSESSMENT — ENCOUNTER SYMPTOMS
COUGH: 0
HEARTBURN: 0
SHORTNESS OF BREATH: 0
CONSTIPATION: 0
SORE THROAT: 0
MYALGIAS: 0
NERVOUS/ANXIOUS: 0
CHILLS: 0
ABDOMINAL PAIN: 0
PALPITATIONS: 0
JOINT SWELLING: 0
HEMATURIA: 0
PARESTHESIAS: 0
EYE PAIN: 0
DIARRHEA: 0
FREQUENCY: 0
HEMATOCHEZIA: 0
FEVER: 0
ARTHRALGIAS: 1
DYSURIA: 0
HEADACHES: 0
NAUSEA: 0
DIZZINESS: 0
WEAKNESS: 0

## 2023-02-05 ASSESSMENT — ACTIVITIES OF DAILY LIVING (ADL): CURRENT_FUNCTION: NO ASSISTANCE NEEDED

## 2023-02-06 ENCOUNTER — OFFICE VISIT (OUTPATIENT)
Dept: FAMILY MEDICINE | Facility: CLINIC | Age: 68
End: 2023-02-06
Payer: COMMERCIAL

## 2023-02-06 VITALS
BODY MASS INDEX: 30.8 KG/M2 | DIASTOLIC BLOOD PRESSURE: 80 MMHG | HEART RATE: 81 BPM | OXYGEN SATURATION: 98 % | HEIGHT: 71 IN | SYSTOLIC BLOOD PRESSURE: 144 MMHG | WEIGHT: 220 LBS | RESPIRATION RATE: 18 BRPM

## 2023-02-06 DIAGNOSIS — I65.22 LEFT CAROTID ARTERY OCCLUSION: ICD-10-CM

## 2023-02-06 DIAGNOSIS — M10.9 GOUTY ARTHROPATHY: ICD-10-CM

## 2023-02-06 DIAGNOSIS — Z00.00 MEDICARE ANNUAL WELLNESS VISIT, SUBSEQUENT: Primary | ICD-10-CM

## 2023-02-06 DIAGNOSIS — I10 ESSENTIAL HYPERTENSION: ICD-10-CM

## 2023-02-06 DIAGNOSIS — I73.9 PVD (PERIPHERAL VASCULAR DISEASE) (H): ICD-10-CM

## 2023-02-06 DIAGNOSIS — E78.2 MIXED HYPERLIPIDEMIA: ICD-10-CM

## 2023-02-06 LAB
ALBUMIN SERPL BCG-MCNC: 4.7 G/DL (ref 3.5–5.2)
ALP SERPL-CCNC: 78 U/L (ref 40–129)
ALT SERPL W P-5'-P-CCNC: 38 U/L (ref 10–50)
ANION GAP SERPL CALCULATED.3IONS-SCNC: 11 MMOL/L (ref 7–15)
AST SERPL W P-5'-P-CCNC: 36 U/L (ref 10–50)
BILIRUB SERPL-MCNC: 0.6 MG/DL
BUN SERPL-MCNC: 20.9 MG/DL (ref 8–23)
CALCIUM SERPL-MCNC: 10.1 MG/DL (ref 8.8–10.2)
CHLORIDE SERPL-SCNC: 102 MMOL/L (ref 98–107)
CHOLEST SERPL-MCNC: 166 MG/DL
CREAT SERPL-MCNC: 1.08 MG/DL (ref 0.67–1.17)
DEPRECATED HCO3 PLAS-SCNC: 25 MMOL/L (ref 22–29)
GFR SERPL CREATININE-BSD FRML MDRD: 74 ML/MIN/1.73M2
GLUCOSE SERPL-MCNC: 111 MG/DL (ref 70–99)
HDLC SERPL-MCNC: 48 MG/DL
LDLC SERPL CALC-MCNC: 88 MG/DL
NONHDLC SERPL-MCNC: 118 MG/DL
POTASSIUM SERPL-SCNC: 5 MMOL/L (ref 3.4–5.3)
PROT SERPL-MCNC: 7.4 G/DL (ref 6.4–8.3)
SODIUM SERPL-SCNC: 138 MMOL/L (ref 136–145)
TRIGL SERPL-MCNC: 152 MG/DL
URATE SERPL-MCNC: 5.1 MG/DL (ref 3.4–7)

## 2023-02-06 PROCEDURE — 80061 LIPID PANEL: CPT | Performed by: FAMILY MEDICINE

## 2023-02-06 PROCEDURE — 84550 ASSAY OF BLOOD/URIC ACID: CPT | Performed by: FAMILY MEDICINE

## 2023-02-06 PROCEDURE — 36415 COLL VENOUS BLD VENIPUNCTURE: CPT | Performed by: FAMILY MEDICINE

## 2023-02-06 PROCEDURE — 80053 COMPREHEN METABOLIC PANEL: CPT | Performed by: FAMILY MEDICINE

## 2023-02-06 PROCEDURE — G0438 PPPS, INITIAL VISIT: HCPCS | Performed by: FAMILY MEDICINE

## 2023-02-06 ASSESSMENT — ACTIVITIES OF DAILY LIVING (ADL)
CONCENTRATING,_REMEMBERING_OR_MAKING_DECISIONS_DIFFICULTY: NO
WEAR_GLASSES_OR_BLIND: YES
TOILETING_ISSUES: NO
HEARING_DIFFICULTY_OR_DEAF: NO
DIFFICULTY_COMMUNICATING: NO
CHANGE_IN_FUNCTIONAL_STATUS_SINCE_ONSET_OF_CURRENT_ILLNESS/INJURY: NO
FALL_HISTORY_WITHIN_LAST_SIX_MONTHS: NO
DRESSING/BATHING_DIFFICULTY: NO
DIFFICULTY_EATING/SWALLOWING: NO
DOING_ERRANDS_INDEPENDENTLY_DIFFICULTY: NO
WALKING_OR_CLIMBING_STAIRS_DIFFICULTY: NO

## 2023-02-06 ASSESSMENT — PAIN SCALES - GENERAL: PAINLEVEL: NO PAIN (0)

## 2023-02-06 NOTE — PROGRESS NOTES
SUBJECTIVE:   Shabbir is a 68 year old who presents for Preventive Visit.  Patient has been advised of split billing requirements and indicates understanding: Yes  Are you in the first 12 months of your Medicare coverage?  No    Is a history of vascular disease including peripheral vascular disease and carotid artery disease.  He has an occluded left carotid artery.  Gives collateral flow.  He follows with vascular surgery.  No symptom concerns.  He is on appropriate medications for cholesterol lowering.  His blood pressure measurements have been a bit higher than ideal but he reports favorable numbers at home.  We had a discussion about this.  We focused on reinforcing lifestyle interventions to maintain blood pressure and also continued close monitoring with the need for adjustment if it truly proves to be higher.  Denies chest pain shortness of breath or other concerning symptoms currently.    He has a history of gout he is on allopurinol 300 no recent outbreaks discussed checking uric acid.    He has a history of cataract surgery.  He notes his right eye has become more blurry.  He has not had a recent eye exam.  Discussed evaluating today with limited eye exam as well as following up with eye care provider for more thorough evaluation.    Reviewed routine health prevention.  We discussed prostate cancer screening again in detail elected to hold off on initiating screening.  He will be due for colonoscopy in 2025 based on previous in 2020 with adenomatous polyp.  Reviewed and discussed immunizations he is up-to-date.    We discussed nutrition and exercise.    Have you ever done Advance Care Planning? (For example, a Health Directive, POLST, or a discussion with a medical provider or your loved ones about your wishes): no    Cognitive Screening   1) Repeat 3 items (Leader, Season, Table)    2) Clock draw:   3) 3 item recall:   Results: 3 items recalled: COGNITIVE IMPAIRMENT LESS LIKELY    Mini-CogTM Copyright S  Ramón. Licensed by the author for use in [facility]; reprinted with permission (courtney@[facility]). All rights reserved.      Do you have sleep apnea, excessive snoring or daytime drowsiness?: no    Reviewed and updated as needed this visit by clinical staff      Social History     Tobacco Use     Smoking status: Former     Packs/day: 1.00     Years: 39.00     Pack years: 39.00     Types: Cigarettes     Quit date: 2014     Years since quittin.6     Smokeless tobacco: Former     Quit date: 2005   Substance Use Topics     Alcohol use: Yes     Comment: Alcoholic Drinks/day: occasional on weekends     If you drink alcohol do you typically have >3 drinks per day or >7 drinks per week? No      AUDIT - Alcohol Use Disorders Identification Test - Reproduced from the World Health Organization Audit 2001 (Second Edition) 2023   1.  How often do you have a drink containing alcohol? 2 to 4 times a month   2.  How many drinks containing alcohol do you have on a typical day when you are drinking? 5 or 6   3.  How often do you have five or more drinks on one occasion? Less than monthly   4.  How often during the last year have you found that you were not able to stop drinking once you had started? Never   5.  How often during the last year have you failed to do what was normally expected of you because of drinking? Never   6.  How often during the last year have you needed a first drink in the morning to get yourself going after a heavy drinking session? Never   7.  How often during the last year have you had a feeling of guilt or remorse after drinking? Never   8.  How often during the last year have you been unable to remember what happened the night before because of your drinking? Never   9.  Have you or someone else been injured because of your drinking? No   10. Has a relative, friend, doctor or other health care worker been concerned about your drinking or suggested you cut down? No   TOTAL SCORE 5  "        Current providers sharing in care for this patient include:   Patient Care Team:  Elmo Crump MD as PCP - General  Elmo Crump MD as Assigned PCP  Adry Mandel MD as Assigned Heart and Vascular Provider    The following health maintenance items are reviewed in Epic and correct as of today:  Health Maintenance   Topic Date Due     ANNUAL REVIEW OF HM ORDERS  Never done     HEPATITIS C SCREENING  Never done     LUNG CANCER SCREENING  12/04/2020     MEDICARE ANNUAL WELLNESS VISIT  09/07/2022     FALL RISK ASSESSMENT  02/06/2024     ADVANCE CARE PLANNING  08/24/2025     LIPID  09/07/2026     DTAP/TDAP/TD IMMUNIZATION (3 - Td or Tdap) 02/19/2030     COLORECTAL CANCER SCREENING  03/06/2030     PHQ-2 (once per calendar year)  Completed     INFLUENZA VACCINE  Completed     Pneumococcal Vaccine: 65+ Years  Completed     ZOSTER IMMUNIZATION  Completed     AORTIC ANEURYSM SCREENING (SYSTEM ASSIGNED)  Completed     COVID-19 Vaccine  Completed     IPV IMMUNIZATION  Aged Out     MENINGITIS IMMUNIZATION  Aged Out             Review of Systems  Complete review of systems is obtained.  Other than the specific considerations noted above complete review of systems is negative.      OBJECTIVE:   BP (!) 144/80   Pulse 81   Resp 18   Ht 1.803 m (5' 11\")   Wt 99.8 kg (220 lb)   SpO2 98%   BMI 30.68 kg/m   Estimated body mass index is 30.68 kg/m  as calculated from the following:    Height as of this encounter: 1.803 m (5' 11\").    Weight as of this encounter: 99.8 kg (220 lb).  Physical Exam        Answers for HPI/ROS submitted by the patient on 2/5/2023  In general, how would you rate your overall physical health?: good  Frequency of exercise:: None  Do you usually eat at least 4 servings of fruit and vegetables a day, include whole grains & fiber, and avoid regularly eating high fat or \"junk\" foods? : No  Activities of Daily Living: no assistance needed  Home safety: no safety concerns identified  Hearing " "Impairment:: no hearing concerns  In the past 6 months, have you been bothered by leaking of urine?: No  In general, how would you rate your overall mental or emotional health?: good  Additional concerns today:: No        ASSESSMENT / PLAN:   Dung was seen today for recheck medication and wellness visit.    Diagnoses and all orders for this visit:    Medicare annual wellness visit, subsequent    PVD (peripheral vascular disease) (H)  -     Comprehensive metabolic panel (BMP + Alb, Alk Phos, ALT, AST, Total. Bili, TP)  -     Lipid panel reflex to direct LDL Fasting    Essential hypertension  -     Comprehensive metabolic panel (BMP + Alb, Alk Phos, ALT, AST, Total. Bili, TP)    Mixed hyperlipidemia  -     Comprehensive metabolic panel (BMP + Alb, Alk Phos, ALT, AST, Total. Bili, TP)  -     Lipid panel reflex to direct LDL Fasting    Gout  -     Uric acid    Left carotid artery occlusion                 COUNSELING:  Reviewed preventive health counseling, as reflected in patient instructions       Regular exercise       Healthy diet/nutrition       Vision screening       Dental care       Colon cancer screening       Prostate cancer screening      BMI:   Estimated body mass index is 30.68 kg/m  as calculated from the following:    Height as of this encounter: 1.803 m (5' 11\").    Weight as of this encounter: 99.8 kg (220 lb).   Weight management plan: Discussed healthy diet and exercise guidelines      He reports that he quit smoking about 8 years ago. His smoking use included cigarettes and cigarettes. He has a 39.00 pack-year smoking history. He quit smokeless tobacco use about 18 years ago.      Appropriate preventive services were discussed with this patient, including applicable screening as appropriate for cardiovascular disease, diabetes, osteopenia/osteoporosis, and glaucoma.  As appropriate for age/gender, discussed screening for colorectal cancer, prostate cancer, breast cancer, and cervical cancer. " Checklist reviewing preventive services available has been given to the patient.    Reviewed patients plan of care and provided an AVS. The Basic Care Plan (routine screening as documented in Health Maintenance) for Dung meets the Care Plan requirement. This Care Plan has been established and reviewed with the Patient.      Elmo Crump MD, MD  [facility]    Identified Health Risks:    Wt Readings from Last 3 Encounters:   02/06/23 99.8 kg (220 lb)   04/01/22 98.8 kg (217 lb 12.8 oz)   09/07/21 97.2 kg (214 lb 3.2 oz)        BP Readings from Last 6 Encounters:   02/06/23 (!) 144/80   05/12/22 (!) 140/80   04/01/22 130/78   09/07/21 (!) 144/82   06/11/21 (!) 156/82   06/03/21 (!) 170/100

## 2023-02-07 DIAGNOSIS — K21.9 GASTROESOPHAGEAL REFLUX DISEASE WITHOUT ESOPHAGITIS: ICD-10-CM

## 2023-02-08 RX ORDER — LANSOPRAZOLE 30 MG/1
CAPSULE, DELAYED RELEASE ORAL
Qty: 90 CAPSULE | Refills: 2 | Status: SHIPPED | OUTPATIENT
Start: 2023-02-08 | End: 2023-11-13

## 2023-02-08 NOTE — TELEPHONE ENCOUNTER
"Routing refill request to provider for review/approval because:  Early refill request    Last Written Prescription Date:  8/16/2022  Last Fill Quantity: 90,  # refills: 2   Last office visit provider:  2/6/2023     Requested Prescriptions   Pending Prescriptions Disp Refills     LANsoprazole (PREVACID) 30 MG DR capsule [Pharmacy Med Name: LANSOPRAZOLE 30MG DR CAPSULE] 90 capsule 2     Sig: TAKE 1 CAPSULE BY MOUTH ONE TIME DAILY       PPI Protocol Passed - 2/8/2023  9:09 AM        Passed - Not on Clopidogrel (unless Pantoprazole ordered)        Passed - No diagnosis of osteoporosis on record        Passed - Recent (12 mo) or future (30 days) visit within the authorizing provider's specialty     Patient has had an office visit with the authorizing provider or a provider within the authorizing providers department within the previous 12 mos or has a future within next 30 days. See \"Patient Info\" tab in inbasket, or \"Choose Columns\" in Meds & Orders section of the refill encounter.              Passed - Medication is active on med list        Passed - Patient is age 18 or older             Adeline Rosas RN 02/08/23 9:10 AM  "

## 2023-02-14 DIAGNOSIS — I10 ESSENTIAL HYPERTENSION: ICD-10-CM

## 2023-02-16 RX ORDER — LISINOPRIL 20 MG/1
TABLET ORAL
Qty: 90 TABLET | Refills: 3 | Status: SHIPPED | OUTPATIENT
Start: 2023-02-16 | End: 2024-02-13

## 2023-02-16 NOTE — TELEPHONE ENCOUNTER
"Routing refill request to provider for review/approval because:  BP on file is >140/90 in the past 12 months    Last Written Prescription Date:  2/10/22  Last Fill Quantity: 90,  # refills: 3   Last office visit provider:  MANUELITO Crump MD 2/06/23    Requested Prescriptions   Pending Prescriptions Disp Refills     lisinopril (ZESTRIL) 20 MG tablet [Pharmacy Med Name: LISINOPRIL 20MG TABLET] 90 tablet 3     Sig: TAKE 1 TABLET BY MOUTH ONE TIME DAILY       ACE Inhibitors (Including Combos) Protocol Failed - 2/14/2023  1:00 AM        Failed - Blood pressure under 140/90 in past 12 months     BP Readings from Last 3 Encounters:   02/06/23 (!) 144/80   05/12/22 (!) 140/80   04/01/22 130/78                 Passed - Recent (12 mo) or future (30 days) visit within the authorizing provider's specialty     Patient has had an office visit with the authorizing provider or a provider within the authorizing providers department within the previous 12 mos or has a future within next 30 days. See \"Patient Info\" tab in inbasket, or \"Choose Columns\" in Meds & Orders section of the refill encounter.              Passed - Medication is active on med list        Passed - Patient is age 18 or older        Passed - Normal serum creatinine on file in past 12 months     Recent Labs   Lab Test 02/06/23  0848   CR 1.08       Ok to refill medication if creatinine is low          Passed - Normal serum potassium on file in past 12 months     Recent Labs   Lab Test 02/06/23  0848   POTASSIUM 5.0                  Darling Clancy RN 02/16/23 8:08 AM  "

## 2023-04-26 NOTE — TELEPHONE ENCOUNTER
I called and spoke to pt. He is aware he needs to est care with another provider to get future refills. He is looking at a few providers in [facility] and will give them a call.  
Please call pt to establish care with another provider. Refilled lansoprazole for 3 months.     Joslyn Calzada MD    
RN cannot approve Refill Request    RN can NOT refill this medication.  Former patient of Dr Iván Read & has not established care with another provider.  Please assign refill request to covering provider per Clinic standard process.     Requested Prescriptions   Pending Prescriptions Disp Refills     lansoprazole (PREVACID) 30 MG capsule 90 capsule 3     Sig: Take 1 capsule (30 mg total) by mouth daily.    GI Medications Refill Protocol Passed - 1/24/2019 10:46 AM       Passed - PCP or prescribing provider visit in last 12 or next 3 months.    Last office visit with prescriber/PCP: 5/3/2017 Iván Read MD OR same dept: Visit date not found OR same specialty: 5/3/2017 Iván Read MD  Last physical: 12/5/2018 Last MTM visit: Visit date not found   Next visit within 3 mo: Visit date not found  Next physical within 3 mo: Visit date not found  Prescriber OR PCP: Iván Read MD  Last diagnosis associated with med order: 1. GERD (gastroesophageal reflux disease)  - lansoprazole (PREVACID) 30 MG capsule; Take 1 capsule (30 mg total) by mouth daily.  Dispense: 90 capsule; Refill: 1    If protocol passes may refill for 12 months if within 3 months of last provider visit (or a total of 15 months).                
Detail Level: Detailed
Detail Level: Zone

## 2023-05-17 DIAGNOSIS — E78.2 MIXED HYPERLIPIDEMIA: ICD-10-CM

## 2023-05-17 DIAGNOSIS — M25.511 CHRONIC PAIN OF BOTH SHOULDERS: ICD-10-CM

## 2023-05-17 DIAGNOSIS — G89.29 CHRONIC PAIN OF BOTH SHOULDERS: ICD-10-CM

## 2023-05-17 DIAGNOSIS — M25.512 CHRONIC PAIN OF BOTH SHOULDERS: ICD-10-CM

## 2023-05-17 RX ORDER — ROSUVASTATIN CALCIUM 40 MG/1
40 TABLET, COATED ORAL AT BEDTIME
Qty: 90 TABLET | Refills: 2 | Status: SHIPPED | OUTPATIENT
Start: 2023-05-17 | End: 2024-02-13

## 2023-05-17 NOTE — TELEPHONE ENCOUNTER
"Routing refill request to provider for review/approval because:  bp and age out of range    Last Written Prescription Date:  7/23/22  Last Fill Quantity: 90,  # refills: 3   Last office visit provider:  2/6/23     Requested Prescriptions   Pending Prescriptions Disp Refills     rosuvastatin (CRESTOR) 40 MG tablet [Pharmacy Med Name: ROSUVASTATIN 40MG TABLET] 90 tablet 3     Sig: TAKE 1 TABLET (40 MG) BY MOUTH AT BEDTIME       Statins Protocol Passed - 5/17/2023  1:06 AM        Passed - LDL on file in past 12 months     Recent Labs   Lab Test 02/06/23  0848   LDL 88             Passed - No abnormal creatine kinase in past 12 months     No lab results found.             Passed - Recent (12 mo) or future (30 days) visit within the authorizing provider's specialty     Patient has had an office visit with the authorizing provider or a provider within the authorizing providers department within the previous 12 mos or has a future within next 30 days. See \"Patient Info\" tab in inbasket, or \"Choose Columns\" in Meds & Orders section of the refill encounter.              Passed - Medication is active on med list        Passed - Patient is age 18 or older           celecoxib (CELEBREX) 100 MG capsule [Pharmacy Med Name: CELECOXIB 100MG CAPSULE] 90 capsule 3     Sig: TAKE ONE CAPSULE BY MOUTH ONCE DAILY       NSAID Medications Failed - 5/17/2023  1:06 AM        Failed - Blood pressure under 140/90 in past 12 months     BP Readings from Last 3 Encounters:   02/06/23 (!) 144/80   05/12/22 (!) 140/80   04/01/22 130/78                 Failed - Patient is age 6-64 years        Failed - Normal CBC on file in past 12 months     Recent Labs   Lab Test 05/22/20  0739 08/23/19  0921 08/13/19  0909   WBC  --   --  6.6   RBC  --   --  5.03   HGB 14.4   < > 15.4   HCT  --   --  44.7      < > 150    < > = values in this interval not displayed.                 Passed - Normal ALT on file in past 12 months     Recent Labs   Lab Test " "02/06/23  0848   ALT 38             Passed - Normal AST on file in past 12 months     Recent Labs   Lab Test 02/06/23  0848   AST 36             Passed - Recent (12 mo) or future (30 days) visit within the authorizing provider's specialty     Patient has had an office visit with the authorizing provider or a provider within the authorizing providers department within the previous 12 mos or has a future within next 30 days. See \"Patient Info\" tab in inbasket, or \"Choose Columns\" in Meds & Orders section of the refill encounter.              Passed - Medication is active on med list        Passed - Normal serum creatinine on file in past 12 months     Recent Labs   Lab Test 02/06/23  0848   CR 1.08       Ok to refill medication if creatinine is low               Bridgette Amador, RN 05/17/23 6:58 PM  "

## 2023-05-18 RX ORDER — CELECOXIB 100 MG/1
CAPSULE ORAL
Qty: 90 CAPSULE | Refills: 3 | Status: SHIPPED | OUTPATIENT
Start: 2023-05-18 | End: 2024-05-14

## 2023-08-03 ENCOUNTER — MYC MEDICAL ADVICE (OUTPATIENT)
Dept: FAMILY MEDICINE | Facility: CLINIC | Age: 68
End: 2023-08-03
Payer: COMMERCIAL

## 2023-08-03 VITALS — DIASTOLIC BLOOD PRESSURE: 86 MMHG | SYSTOLIC BLOOD PRESSURE: 140 MMHG

## 2023-08-03 NOTE — TELEPHONE ENCOUNTER
Patient reported vitals entered into chart.    OLGA CastellanosN, RN  [facility]

## 2023-08-13 DIAGNOSIS — I10 ESSENTIAL HYPERTENSION: ICD-10-CM

## 2023-08-13 NOTE — TELEPHONE ENCOUNTER
"Routing refill request to provider for review/approval because:  Labs out of range:  BP  Early refill request  Pt requested removal   Please review note to pharmacy    Last Written Prescription Date:  9/9/22  Last Fill Quantity: 90,  # refills: 3   Last office visit provider:   2/6/23    Requested Prescriptions   Pending Prescriptions Disp Refills    amLODIPine (NORVASC) 10 MG tablet [Pharmacy Med Name: AMLODIPINE 10MG TABLET] 90 tablet 3     Sig: TAKE 1 TABLET BY MOUTH ONCE DAILY       Calcium Channel Blockers Protocol  Failed - 8/13/2023  6:56 AM        Failed - Blood pressure under 140/90 in past 12 months     BP Readings from Last 3 Encounters:   08/03/23 (!) 140/86   02/06/23 (!) 144/80   05/12/22 (!) 140/80                 Passed - Recent (12 mo) or future (30 days) visit within the authorizing provider's specialty     Patient has had an office visit with the authorizing provider or a provider within the authorizing providers department within the previous 12 mos or has a future within next 30 days. See \"Patient Info\" tab in inbasket, or \"Choose Columns\" in Meds & Orders section of the refill encounter.              Passed - Medication is active on med list        Passed - Patient is age 18 or older        Passed - Normal serum creatinine on file in past 12 months     Recent Labs   Lab Test 02/06/23  0848   CR 1.08       Ok to refill medication if creatinine is low               Ramila Heaton RN 08/13/23 6:56 AM  "

## 2023-08-14 RX ORDER — AMLODIPINE BESYLATE 10 MG/1
10 TABLET ORAL DAILY
Qty: 90 TABLET | Refills: 3 | Status: SHIPPED | OUTPATIENT
Start: 2023-08-14 | End: 2024-08-14

## 2023-11-11 DIAGNOSIS — M10.9 GOUTY ARTHROPATHY: ICD-10-CM

## 2023-11-11 DIAGNOSIS — K21.9 GASTROESOPHAGEAL REFLUX DISEASE WITHOUT ESOPHAGITIS: ICD-10-CM

## 2023-11-11 DIAGNOSIS — I73.9 PVD (PERIPHERAL VASCULAR DISEASE) (H): ICD-10-CM

## 2023-11-13 RX ORDER — CILOSTAZOL 100 MG/1
TABLET ORAL
Qty: 180 TABLET | Refills: 3 | Status: SHIPPED | OUTPATIENT
Start: 2023-11-13

## 2023-11-13 RX ORDER — ALLOPURINOL 300 MG/1
TABLET ORAL
Qty: 90 TABLET | Refills: 3 | Status: SHIPPED | OUTPATIENT
Start: 2023-11-13

## 2023-11-13 RX ORDER — LANSOPRAZOLE 30 MG/1
30 CAPSULE, DELAYED RELEASE ORAL DAILY
Qty: 90 CAPSULE | Refills: 0 | Status: SHIPPED | OUTPATIENT
Start: 2023-11-13 | End: 2024-02-19

## 2023-11-17 ENCOUNTER — PATIENT OUTREACH (OUTPATIENT)
Dept: GASTROENTEROLOGY | Facility: CLINIC | Age: 68
End: 2023-11-17
Payer: COMMERCIAL

## 2024-01-03 NOTE — PROGRESS NOTES
[facility]        Patient is here for a 1 year f/u-carotid and PVD surveillance. US prior    Pt is currently taking Statin, ASA, Pletal.    /85   Pulse 90   Temp 97.4  F (36.3  C)   Resp 18     The provider has been notified that the patient has no concerns.     Questions patient would like addressed today are: N/A.    Refills are needed: No    Has homecare services and agency name:  No

## 2024-01-03 NOTE — PATIENT INSTRUCTIONS
Sonia Razo,    Thank you for entrusting your care with us today. After your visit today with Shauna KLEIN this is the plan that was discussed at your appointment.    Follow up in 1 year for repeat ultrasounds and clinic visit.     We will call you closer to that date to get you scheduled.       I am including additional information on these things and our contact information if you have any questions or concerns.   Please do not hesitate to reach out to us if you felt we did not answer your questions or you are unsure of the treatment plan after your visit today. Our number is [phone].Thank you for trusting us with your care.         Again thank you for your time.              Ankle-Brachial Index (MACK) or Physiologic Test    Description  An ankle-brachial index test is relatively pain free. Blood pressure cuffs of various sizes are placed on your thigh, calf, foot and toes.  Similar to having your blood pressure checked with an arm cuff, as the technician inflates the cuffs, they progressively tighten and are then quickly released.  You may feel some discomfort, but generally for less than 60 seconds for each measurement. You will be asked to remove your socks and shoes and possibly your pants or shorts. Gowns will be provided. It usually takes about 30-60 minutes.   Depending on the initial readings and patient symptoms, you may be asked to perform a light walk on a treadmill.  The technician will apply ultrasound gel, usually warmed for your comfort, to your ankles and wrists. Through the gel, the technician will use a small hand-held device that emits sound waves.  Risks  There are typically no side effects or complications associated with a physiologic study.  How to Prepare  Eat and take medications as usual.  There is no preparation required for an ankle-brachial index (MACK) or physiologic exam.  What Can I Expect After the Test?  The technician will send the ultrasound images to your vascular  surgeon for evaluation. Typically, a report is available in 2-3 days. If anything critical is found, it is standard practice to notify the vascular surgeon immediately.  Reference: [email or web address]/patient-resources/vascular-tests     Lower Extremity Arterial Ultrasound    Description  Ultrasound examinations are painless and easy for the patient. The vascular laboratory will contain a bed and just two or three pieces of equipment. You will be asked to remove pants or shorts and gowns will be provided. It usually takes about 30 minutes.  The technician will tuck a towel under your underpants in the groin. The gel is water-soluble and will not stain your skin or clothes.  Ultrasound gel, usually warmed for your comfort, will be placed on the inner side of your legs.    Through the gel, the technician will apply to your legs a small hand-held device that emits sound waves.  When the test is completed, the technician will remove excess gel from your legs.    Risks  There are typically no side effects or complications associated with a lower extremity arterial duplex ultrasound.  How to Prepare  Eat and take medications as usual.  There is no preparation required for a lower extremity arterial duplex ultrasound.  What Can I Expect After the Test?  The technician will send the ultrasound images to your vascular surgeon for evaluation. Typically, a report is available in 2-3 days. If anything critical is found, it is standard practice to notify the vascular surgeon immediately.  Reference: https://vascular.org/patient-resources/vascular-tests     Carotid Duplex    Steps for the test are:  You will be asked to lie on a stretcher. It will be okay for you to wear your street clothes. In some situations, you may be asked to change into a gown.    Ultrasound gel, usually warmed for your comfort, will be placed on either side of your neck.    Through the gel, the technician will apply to your neck a small hand-held device  that emits sound waves.   When the test is completed, the technician will remove excess gel from your neck. The gel is water-soluble and will not stain your skin or clothes.    How to Prepare:  Eat and take medications as usual.   Wear minimal or no jewelry to ease application and removal of gel.    Risks  There are typically no side effects or complications associated with a carotid duplex ultrasound examination.    What Can I Expect After the Test?  The technician will send the ultrasound images to your vascular surgeon for evaluation. Typically, a report is available in 2-3 days. If anything critical is found, it is standard practice to notify the vascular surgeon immediately.  Reference: https://vascular.org/patient-resources/vascular-tests

## 2024-01-05 NOTE — PROGRESS NOTES
VASCULAR SURGERY PROGRESS NOTE    LOCATION:  [facility]     Dung Negrete  Medical Record #: [id number]  YOB: 1955  Age: 68 year old     Date of Service: 1/8/2024    PRIMARY CARE PROVIDER: Elmo Crump    Reason for visit: Surveillance of PAD and carotid disease    IMPRESSION: 68-year-old male presenting for follow-up of carotid artery stenosis and peripheral arterial disease status post right SFA stent for short distance claudication.  Carotid duplex shows less than 50% stenosis of the right ICA and a known left ICA occlusion.  Remains completely asymptomatic. From a lower extremity standpoint, patient is doing well with manageable claudication of the left leg.  Denies any rest pain or nonhealing wounds.  ABIs remain stable at 1.14 on the right and 1.00 on the left with toe pressures adequate for wound healing bilaterally.  Biphasic flow throughout the right lower extremity with a patent stent and stenosis of the left SFA with velocities up to 363 cm/s.  Medically optimized on aspirin, statin, and cilostazol.    RECOMMENDATION/RISKS: Continue best medical management with aspirin, statin, and Pletal.  Encouraged regular activity/ambulation.  Follow-up in 1 year with repeat studies.     HPI:  Dung Negrete is a 68 year old male with past medical history significant for hyperlipidemia, GERD, and peripheral arterial disease status post right SFA stent for short distance claudication in 2020.  Patient was last seen in the vascular clinic 1 year ago and was noted to be doing well with manageable claudication of the left leg.  Ultrasound studies were stable at this time and patient was noted to be medically optimized on aspirin, statin, and cilostazol therapy.  He is also doing well from a carotid standpoint without any symptoms and a stable duplex.     Today, Mr. Rodríguez presents for follow-up.  He is doing well and denies any vision changes, slurred speech, facial asymmetry,  unilateral extremity numbness or weakness, or other signs/symptoms of TIA or stroke.  From a lower extremity standpoint he continues to have some mild claudication of the left leg after walking for some distance.  He also experiences back pain and notes this is what limits his activity most.  He denies any pain in his legs at rest or nonhealing lower extremity wounds.  Patient is compliant with his aspirin, statin, and cilostazol.  He takes 200 mg in the morning rather than taking 100 mg twice daily.  Denies any side effects.    Ultrasound results were discussed and all questions answered.  No other concerns.    REVIEW OF SYSTEMS:    A 12 point ROS was reviewed and is negative except for what is listed above in HPI.    PHH:    Past Medical History:   Diagnosis Date    Cellulitis and abscess of leg 8/31/2006    Dyspnea 5/27/2015    Enthesopathy of knee 8/31/2006    GERD (gastroesophageal reflux disease) 1/8/2017    Health examination of defined subpopulation 12/27/2005    History of total hip arthroplasty, left 12/28/2018    Mixed hyperlipidemia     Created by Conversion     Polyarthralgia 5/17/2017    Primary osteoarthritis involving multiple joints 5/17/2017    Sprain of neck     Created by Conversion       Past Surgical History:   Procedure Laterality Date    ANGIOPLASTY Left 2007    IR EXTREMITY ANGIOGRAM BILATERAL  4/17/2007    IR EXTREMITY ANGIOGRAM RIGHT  8/23/2019    IR EXTREMITY ANGIOGRAM RIGHT  5/22/2020    IR EXTREMITY ANGIOGRAM RIGHT  8/23/2019    IR EXTREMITY ANGIOGRAM RIGHT  5/22/2020    TOTAL HIP ARTHROPLASTY Left 12/28/2019    TOTAL HIP ARTHROPLASTY Right      ALLERGIES:  Patient has no known allergies.    MEDS:    Current Outpatient Medications:     allopurinol (ZYLOPRIM) 300 MG tablet, TAKE 1 TABLET BY MOUTH ONE TIME DAILY, Disp: 90 tablet, Rfl: 3    amLODIPine (NORVASC) 10 MG tablet, TAKE 1 TABLET BY MOUTH ONCE DAILY, Disp: 90 tablet, Rfl: 3    aspirin 81 MG EC tablet, [ASPIRIN 81 MG EC TABLET]  Take 81 mg by mouth daily., Disp: , Rfl:     celecoxib (CELEBREX) 100 MG capsule, TAKE ONE CAPSULE BY MOUTH ONCE DAILY, Disp: 90 capsule, Rfl: 3    cholecalciferol, vitamin D3, (VITAMIN D3) 400 unit cap, [CHOLECALCIFEROL, VITAMIN D3, (VITAMIN D3) 400 UNIT CAP] Take 1 capsule by mouth daily., Disp: , Rfl:     cilostazol (PLETAL) 100 MG tablet, TAKE 1 TABLET BY MOUTH TWICE DAILY, Disp: 180 tablet, Rfl: 3    LANsoprazole (PREVACID) 30 MG DR capsule, TAKE ONE CAPSULE BY MOUTH ONCE DAILY, Disp: 90 capsule, Rfl: 0    lisinopril (ZESTRIL) 20 MG tablet, TAKE 1 TABLET BY MOUTH ONE TIME DAILY, Disp: 90 tablet, Rfl: 3    Multiple Vitamin (MULTI VITAMIN) TABS, Take 1 tablet by mouth, Disp: , Rfl:     rosuvastatin (CRESTOR) 40 MG tablet, TAKE 1 TABLET (40 MG) BY MOUTH AT BEDTIME, Disp: 90 tablet, Rfl: 2    sildenafiL (VIAGRA) 50 MG tablet, [SILDENAFIL (VIAGRA) 50 MG TABLET] Take 1 tablet (50 mg total) by mouth as needed for erectile dysfunction., Disp: 30 tablet, Rfl: 1    SOCIAL HABITS:    History   Smoking Status    Former    Packs/day: 1.00    Years: 39.00    Types: Cigarettes, Cigarettes    Quit date: 6/1/2014   Smokeless Tobacco    Former    Quit date: 2/4/2005     Social History    Substance and Sexual Activity      Alcohol use: Yes        Comment: Alcoholic Drinks/day: occasional on weekends      History   Drug Use    Types: Marijuana     FAMILY HISTORY:    Family History   Problem Relation Age of Onset    No Known Problems Mother     Heart Disease Father     Hypertension Father     Diabetes Sister     Heart Disease Brother     Diabetes Brother     Coronary Artery Disease Brother     Diabetes Sister      PE:  There were no vitals taken for this visit.  Wt Readings from Last 1 Encounters:   02/06/23 99.8 kg (220 lb)     There is no height or weight on file to calculate BMI.    EXAM:  GENERAL: well-developed 68 year old male who appears his stated age  CARDIAC: normal   CHEST/LUNG: normal respiratory effort    MUSCULOSKELETAL: grossly normal and both lower extremities are intact, no lower extremity edema  NEUROLOGIC: focally intact, alert and oriented x 3  PSYCH: appropriate affect    DIAGNOSTIC STUDIES:     Images:    US Carotid Bilateral   US L Ext Arterial Doppler Seg Pres w Exer [place]   US Lower Extremity Arterial Duplex Right     I personally reviewed the images and my interpretation is less than 50% stenosis of the right ICA and a known left ICA occlusion.  ABIs remain stable at 1.14 on the right and 1.0 on the left with toe pressures adequate for wound healing bilaterally.  Arterial duplex demonstrating a widely patent right SFA stent with biphasic flow throughout the right lower extremity and elevation of the left SFA with velocities up to 318  cm/s.    LABS:      Sodium   Date Value Ref Range Status   02/06/2023 138 136 - 145 mmol/L Final   09/07/2021 142 136 - 145 mmol/L Final   08/25/2020 143 136 - 145 mmol/L Final     Urea Nitrogen   Date Value Ref Range Status   02/06/2023 20.9 8.0 - 23.0 mg/dL Final   09/07/2021 17 8 - 22 mg/dL Final   08/25/2020 18 8 - 22 mg/dL Final   06/18/2019 16 8 - 22 mg/dL Final     Hemoglobin   Date Value Ref Range Status   05/22/2020 14.4 14.0 - 18.0 g/dL Final   08/23/2019 15.6 14.0 - 18.0 g/dL Final   08/13/2019 15.4 14.0 - 18.0 g/dL Final     Platelet Count   Date Value Ref Range Status   05/22/2020 154 140 - 440 thou/uL Final   08/23/2019 161 140 - 440 thou/uL Final   08/13/2019 150 140 - 440 thou/uL Final     INR   Date Value Ref Range Status   05/22/2020 1.01 0.90 - 1.10 Final   08/23/2019 1.00 0.90 - 1.10 Final     30 minutes spent on the day of encounter doing chart review, history and exam, documentation, and further activities as noted.     Shauna Stroud PA-C  VASCULAR SURGERY

## 2024-01-08 ENCOUNTER — OFFICE VISIT (OUTPATIENT)
Dept: VASCULAR SURGERY | Facility: CLINIC | Age: 69
End: 2024-01-08
Attending: SURGERY
Payer: COMMERCIAL

## 2024-01-08 ENCOUNTER — ANCILLARY PROCEDURE (OUTPATIENT)
Dept: VASCULAR ULTRASOUND | Facility: CLINIC | Age: 69
End: 2024-01-08
Attending: SURGERY
Payer: COMMERCIAL

## 2024-01-08 VITALS
TEMPERATURE: 97.4 F | DIASTOLIC BLOOD PRESSURE: 85 MMHG | RESPIRATION RATE: 18 BRPM | HEART RATE: 90 BPM | SYSTOLIC BLOOD PRESSURE: 131 MMHG

## 2024-01-08 DIAGNOSIS — I73.9 PVD (PERIPHERAL VASCULAR DISEASE) (H): ICD-10-CM

## 2024-01-08 DIAGNOSIS — R09.89 OTHER SPECIFIED SYMPTOMS AND SIGNS INVOLVING THE CIRCULATORY AND RESPIRATORY SYSTEMS: ICD-10-CM

## 2024-01-08 DIAGNOSIS — I65.22 STENOSIS OF LEFT CAROTID ARTERY: ICD-10-CM

## 2024-01-08 DIAGNOSIS — I73.9 PVD (PERIPHERAL VASCULAR DISEASE) (H): Primary | ICD-10-CM

## 2024-01-08 PROCEDURE — 93880 EXTRACRANIAL BILAT STUDY: CPT | Mod: 26 | Performed by: SURGERY

## 2024-01-08 PROCEDURE — 93880 EXTRACRANIAL BILAT STUDY: CPT

## 2024-01-08 PROCEDURE — 93926 LOWER EXTREMITY STUDY: CPT | Mod: 26 | Performed by: SURGERY

## 2024-01-08 PROCEDURE — G0463 HOSPITAL OUTPT CLINIC VISIT: HCPCS | Mod: 25 | Performed by: PHYSICIAN ASSISTANT

## 2024-01-08 PROCEDURE — 93924 LWR XTR VASC STDY BILAT: CPT | Mod: 26 | Performed by: SURGERY

## 2024-01-08 PROCEDURE — 99214 OFFICE O/P EST MOD 30 MIN: CPT | Performed by: PHYSICIAN ASSISTANT

## 2024-01-08 PROCEDURE — 93926 LOWER EXTREMITY STUDY: CPT | Mod: RT

## 2024-01-08 PROCEDURE — 93924 LWR XTR VASC STDY BILAT: CPT

## 2024-01-08 ASSESSMENT — PAIN SCALES - GENERAL: PAINLEVEL: NO PAIN (0)

## 2024-02-13 DIAGNOSIS — E78.2 MIXED HYPERLIPIDEMIA: ICD-10-CM

## 2024-02-13 DIAGNOSIS — I10 ESSENTIAL HYPERTENSION: ICD-10-CM

## 2024-02-13 RX ORDER — LISINOPRIL 20 MG/1
20 TABLET ORAL DAILY
Qty: 90 TABLET | Refills: 3 | Status: SHIPPED | OUTPATIENT
Start: 2024-02-13 | End: 2024-03-18

## 2024-02-13 RX ORDER — ROSUVASTATIN CALCIUM 40 MG/1
40 TABLET, COATED ORAL AT BEDTIME
Qty: 90 TABLET | Refills: 2 | Status: SHIPPED | OUTPATIENT
Start: 2024-02-13

## 2024-02-19 DIAGNOSIS — K21.9 GASTROESOPHAGEAL REFLUX DISEASE WITHOUT ESOPHAGITIS: ICD-10-CM

## 2024-02-19 RX ORDER — LANSOPRAZOLE 30 MG/1
30 CAPSULE, DELAYED RELEASE ORAL DAILY
Qty: 90 CAPSULE | Refills: 0 | Status: SHIPPED | OUTPATIENT
Start: 2024-02-19 | End: 2024-05-24

## 2024-02-23 DIAGNOSIS — K21.9 GASTROESOPHAGEAL REFLUX DISEASE WITHOUT ESOPHAGITIS: ICD-10-CM

## 2024-02-23 RX ORDER — LANSOPRAZOLE 30 MG/1
30 CAPSULE, DELAYED RELEASE ORAL DAILY
Qty: 90 CAPSULE | Refills: 0 | OUTPATIENT
Start: 2024-02-23

## 2024-03-12 SDOH — HEALTH STABILITY: PHYSICAL HEALTH: ON AVERAGE, HOW MANY DAYS PER WEEK DO YOU ENGAGE IN MODERATE TO STRENUOUS EXERCISE (LIKE A BRISK WALK)?: 3 DAYS

## 2024-03-12 SDOH — HEALTH STABILITY: PHYSICAL HEALTH: ON AVERAGE, HOW MANY MINUTES DO YOU ENGAGE IN EXERCISE AT THIS LEVEL?: 30 MIN

## 2024-03-12 ASSESSMENT — SOCIAL DETERMINANTS OF HEALTH (SDOH): HOW OFTEN DO YOU GET TOGETHER WITH FRIENDS OR RELATIVES?: TWICE A WEEK

## 2024-03-18 ENCOUNTER — OFFICE VISIT (OUTPATIENT)
Dept: FAMILY MEDICINE | Facility: CLINIC | Age: 69
End: 2024-03-18
Payer: COMMERCIAL

## 2024-03-18 VITALS
RESPIRATION RATE: 18 BRPM | SYSTOLIC BLOOD PRESSURE: 146 MMHG | HEART RATE: 77 BPM | BODY MASS INDEX: 31.32 KG/M2 | WEIGHT: 223.7 LBS | TEMPERATURE: 98.6 F | HEIGHT: 71 IN | DIASTOLIC BLOOD PRESSURE: 78 MMHG | OXYGEN SATURATION: 98 %

## 2024-03-18 DIAGNOSIS — E78.2 MIXED HYPERLIPIDEMIA: ICD-10-CM

## 2024-03-18 DIAGNOSIS — M10.9 GOUTY ARTHROPATHY: ICD-10-CM

## 2024-03-18 DIAGNOSIS — I65.22 LEFT CAROTID ARTERY OCCLUSION: ICD-10-CM

## 2024-03-18 DIAGNOSIS — R73.9 HYPERGLYCEMIA: ICD-10-CM

## 2024-03-18 DIAGNOSIS — I73.9 PVD (PERIPHERAL VASCULAR DISEASE) (H): ICD-10-CM

## 2024-03-18 DIAGNOSIS — K21.9 GASTROESOPHAGEAL REFLUX DISEASE WITHOUT ESOPHAGITIS: ICD-10-CM

## 2024-03-18 DIAGNOSIS — I10 ESSENTIAL HYPERTENSION: ICD-10-CM

## 2024-03-18 DIAGNOSIS — Z00.00 MEDICARE ANNUAL WELLNESS VISIT, SUBSEQUENT: Primary | ICD-10-CM

## 2024-03-18 LAB
ALBUMIN SERPL BCG-MCNC: 4.6 G/DL (ref 3.5–5.2)
ALP SERPL-CCNC: 83 U/L (ref 40–150)
ALT SERPL W P-5'-P-CCNC: 31 U/L (ref 0–70)
ANION GAP SERPL CALCULATED.3IONS-SCNC: 11 MMOL/L (ref 7–15)
AST SERPL W P-5'-P-CCNC: 30 U/L (ref 0–45)
BILIRUB SERPL-MCNC: 0.4 MG/DL
BUN SERPL-MCNC: 16.6 MG/DL (ref 8–23)
CALCIUM SERPL-MCNC: 9.7 MG/DL (ref 8.8–10.2)
CHLORIDE SERPL-SCNC: 105 MMOL/L (ref 98–107)
CHOLEST SERPL-MCNC: 145 MG/DL
CREAT SERPL-MCNC: 0.99 MG/DL (ref 0.67–1.17)
DEPRECATED HCO3 PLAS-SCNC: 23 MMOL/L (ref 22–29)
EGFRCR SERPLBLD CKD-EPI 2021: 82 ML/MIN/1.73M2
FASTING STATUS PATIENT QL REPORTED: YES
GLUCOSE SERPL-MCNC: 98 MG/DL (ref 70–99)
HBA1C MFR BLD: 5.4 % (ref 0–5.6)
HDLC SERPL-MCNC: 47 MG/DL
LDLC SERPL CALC-MCNC: 67 MG/DL
NONHDLC SERPL-MCNC: 98 MG/DL
POTASSIUM SERPL-SCNC: 4.8 MMOL/L (ref 3.4–5.3)
PROT SERPL-MCNC: 7.3 G/DL (ref 6.4–8.3)
SODIUM SERPL-SCNC: 139 MMOL/L (ref 135–145)
TRIGL SERPL-MCNC: 153 MG/DL
URATE SERPL-MCNC: 4.9 MG/DL (ref 3.4–7)

## 2024-03-18 PROCEDURE — 99214 OFFICE O/P EST MOD 30 MIN: CPT | Mod: 25 | Performed by: FAMILY MEDICINE

## 2024-03-18 PROCEDURE — 84550 ASSAY OF BLOOD/URIC ACID: CPT | Performed by: FAMILY MEDICINE

## 2024-03-18 PROCEDURE — G0439 PPPS, SUBSEQ VISIT: HCPCS | Performed by: FAMILY MEDICINE

## 2024-03-18 PROCEDURE — 36415 COLL VENOUS BLD VENIPUNCTURE: CPT | Performed by: FAMILY MEDICINE

## 2024-03-18 PROCEDURE — 83036 HEMOGLOBIN GLYCOSYLATED A1C: CPT | Performed by: FAMILY MEDICINE

## 2024-03-18 PROCEDURE — 80061 LIPID PANEL: CPT | Performed by: FAMILY MEDICINE

## 2024-03-18 PROCEDURE — 80053 COMPREHEN METABOLIC PANEL: CPT | Performed by: FAMILY MEDICINE

## 2024-03-18 RX ORDER — LISINOPRIL 40 MG/1
40 TABLET ORAL DAILY
Qty: 90 TABLET | Refills: 3 | Status: SHIPPED | OUTPATIENT
Start: 2024-03-18

## 2024-03-18 ASSESSMENT — ACTIVITIES OF DAILY LIVING (ADL)
DRESSING/BATHING_DIFFICULTY: NO
WEAR_GLASSES_OR_BLIND: YES
DOING_ERRANDS_INDEPENDENTLY_DIFFICULTY: NO
WALKING_OR_CLIMBING_STAIRS_DIFFICULTY: NO
CONCENTRATING,_REMEMBERING_OR_MAKING_DECISIONS_DIFFICULTY: NO
DIFFICULTY_COMMUNICATING: NO
DIFFICULTY_EATING/SWALLOWING: NO
CHANGE_IN_FUNCTIONAL_STATUS_SINCE_ONSET_OF_CURRENT_ILLNESS/INJURY: NO
FALL_HISTORY_WITHIN_LAST_SIX_MONTHS: NO
TOILETING_ISSUES: NO
HEARING_DIFFICULTY_OR_DEAF: NO

## 2024-03-18 ASSESSMENT — PAIN SCALES - GENERAL
PAINLEVEL: NO PAIN (0)
PAINLEVEL: NO PAIN (0)

## 2024-03-18 NOTE — PROGRESS NOTES
"Preventive Care Visit  [facility]  Elmo Crump MD, MD, Family Medicine  Mar 18, 2024      Assessment & Plan     Shabbir was seen today for recheck medication and wellness visit.    Diagnoses and all orders for this visit:    Medicare annual wellness visit, subsequent    Essential hypertension  -     Comprehensive metabolic panel (BMP + Alb, Alk Phos, ALT, AST, Total. Bili, TP); Future  -     lisinopril (ZESTRIL) 40 MG tablet; Take 1 tablet (40 mg) by mouth daily  -     Comprehensive metabolic panel (BMP + Alb, Alk Phos, ALT, AST, Total. Bili, TP)    Mixed hyperlipidemia  -     Comprehensive metabolic panel (BMP + Alb, Alk Phos, ALT, AST, Total. Bili, TP); Future  -     Lipid panel reflex to direct LDL Fasting; Future  -     Comprehensive metabolic panel (BMP + Alb, Alk Phos, ALT, AST, Total. Bili, TP)  -     Lipid panel reflex to direct LDL Fasting    PVD (peripheral vascular disease) (H24)    Gouty arthropathy  -     Uric acid; Future  -     Uric acid    Left carotid artery occlusion    Gastroesophageal reflux disease without esophagitis    Hyperglycemia  -     Hemoglobin A1c; Future  -     Hemoglobin A1c           Patient has been advised of split billing requirements and indicates understanding: Yes          BMI  Estimated body mass index is 31.2 kg/m  as calculated from the following:    Height as of this encounter: 1.803 m (5' 11\").    Weight as of this encounter: 101.5 kg (223 lb 11.2 oz).   Weight management plan: Discussed healthy diet and exercise guidelines    Counseling  Appropriate preventive services were discussed with this patient, including applicable screening as appropriate for fall prevention, nutrition, physical activity, Tobacco-use cessation, weight loss and cognition.  Checklist reviewing preventive services available has been given to the patient.  Reviewed patient's diet, addressing concerns and/or questions.   He is at risk for lack of exercise and has been provided " with information to increase physical activity for the benefit of his well-being.   The patient was instructed to see the dentist every 6 months.   The patient was provided with written information regarding signs of hearing loss.           Shirlene Shea is a 69 year old, presenting for the following:  Recheck Medication and Wellness Visit    He has peripheral vascular disease, hypertension and dyslipidemia.  He has carotid artery occlusion.  He follows with vascular surgery.  He has no symptom concerns.  He had recent reassessment in January 2024 of carotids and peripheral vasculature.  He remains on the same medications.  He has good suppression of LDL cholesterol.  He reports overall he feels well.  Blood pressure has been persistently elevated on readings taken here.  Previously has reported that readings are much improved at home, reporting that readings now at home seem to be in the 140 range more of the time.  Discussed taking steps to improve blood pressure control elected to increase lisinopril.    He has history of gout takes allopurinol 300 does not report any gout outbreaks since beginning allopurinol.  Will check uric acid level.    We discussed prostate cancer screening briefly, will avoid PSA testing and digital rectal exam for the time being.  He is due for colonoscopy in 1 year.  Discussed appropriate immunizations defer for the time being.    Discussed chronic issues with low back and sciatica.  Had spine specialist evaluation in the past.  Feels he is managing currently but will consider reevaluation and or different treatment options if indicated.        3/18/2024     9:55 AM   Additional Questions   Roomed by am cma         Health Care Directive  Patient does not have a Health Care Directive or Living Will: Discussed advance care planning with patient; information given to patient to review.      Do you have a current opioid prescription? no  Do you use any other controlled substances or  medications that are not prescribed by a provider?  no            3/12/2024   General Health   How would you rate your overall physical health? Good   Feel stress (tense, anxious, or unable to sleep) Not at all         3/12/2024   Nutrition   Diet: Regular (no restrictions)         3/12/2024   Exercise   Days per week of moderate/strenous exercise 3 days   Average minutes spent exercising at this level 30 min         3/12/2024   Social Factors   Frequency of gathering with friends or relatives Twice a week   Worry food won't last until get money to buy more No   Food not last or not have enough money for food? No   Do you have housing?  Yes   Are you worried about losing your housing? No   Lack of transportation? No   Unable to get utilities (heat,electricity)? No         3/12/2024   Activities of Daily Living- Home Safety   Needs help with the following daily activites None of the above   Safety concerns in the home None of the above         3/12/2024   Dental   Dentist two times every year? (!) NO         3/12/2024   Hearing Screening   Hearing concerns? (!) IT'S HARD TO FOLLOW A CONVERSATION IN A NOISY RESTAURANT OR CROWDED ROOM.    (!) TROUBLE FOLLOWING DIALOGUE IN THE THEATHER.    (!) TROUBLE UNDERSTANDING SOFT OR WHISPERED SPEECH.         3/12/2024   Driving Risk Screening   Patient/family members have concerns about driving No         3/12/2024   General Alertness/Fatigue Screening   Have you been more tired than usual lately? No         3/12/2024   Urinary Incontinence Screening   Bothered by leaking urine in past 6 months No         3/12/2024   TB Screening   Were you born outside of [place]?  No         Today's PHQ-2 Score:       3/18/2024     9:49 AM   PHQ-2 ( 1999 [employer])   Q1: Little interest or pleasure in doing things 0   Q2: Feeling down, depressed or hopeless 0   PHQ-2 Score 0   Q1: Little interest or pleasure in doing things Not at all   Q2: Feeling down, depressed or hopeless Not at all   PHQ-2 Score 0            3/12/2024   Substance Use   Alcohol more than 3/day or more than 7/wk No   Do you have a current opioid prescription? No   How severe/bad is pain from 1 to 10? 4/10   Do you use any other substances recreationally? No     Social History     Tobacco Use    Smoking status: Former     Packs/day: 1.00     Years: 39.00     Additional pack years: 0.00     Total pack years: 39.00     Types: Cigarettes     Quit date: 2014     Years since quittin.8    Smokeless tobacco: Former     Quit date: 2005   Vaping Use    Vaping Use: Never used   Substance Use Topics    Alcohol use: Yes     Comment: Alcoholic Drinks/day: occasional on weekends    Drug use: Yes     Types: Marijuana       ASCVD Risk   The 10-year ASCVD risk score (Kailyn WANG, et al., 2019) is: 23.9%    Values used to calculate the score:      Age: 69 years      Sex: Male      Is Non- : No      Diabetic: No      Tobacco smoker: No      Systolic Blood Pressure: 146 mmHg      Is BP treated: Yes      HDL Cholesterol: 48 mg/dL      Total Cholesterol: 166 mg/dL            Reviewed and updated as needed this visit by Provider                      Current providers sharing in care for this patient include:  Patient Care Team:  Elmo Crump MD as PCP - General  Elmo Crump MD as Assigned PCP  Shauna Stroud PA-C as Assigned Heart and Vascular Provider    The following health maintenance items are reviewed in Epic and correct as of today:  Health Maintenance   Topic Date Due    ANNUAL REVIEW OF  ORDERS  Never done    HEPATITIS C SCREENING  Never done    RSV VACCINE (Pregnancy & 60+) (1 - 1-dose 60+ series) Never done    LUNG CANCER SCREENING  2020    INFLUENZA VACCINE (1) 2023    COVID-19 Vaccine ( - -24 season) 2023    MEDICARE ANNUAL WELLNESS VISIT  2024    LIPID  2024    COLORECTAL CANCER SCREENING  2025    FALL RISK ASSESSMENT  2025    ADVANCE CARE PLANNING   "08/24/2025    GLUCOSE  02/06/2026    DTAP/TDAP/TD IMMUNIZATION (3 - Td or Tdap) 02/19/2030    PHQ-2 (once per calendar year)  Completed    Pneumococcal Vaccine: 65+ Years  Completed    ZOSTER IMMUNIZATION  Completed    AORTIC ANEURYSM SCREENING (SYSTEM ASSIGNED)  Completed    IPV IMMUNIZATION  Aged Out    HPV IMMUNIZATION  Aged Out    MENINGITIS IMMUNIZATION  Aged Out    RSV MONOCLONAL ANTIBODY  Aged Out       Complete review of systems is obtained.  Other than the specific considerations noted above complete review of systems is negative.       Objective    Exam  BP (!) 146/78   Pulse 77   Temp 98.6  F (37  C)   Resp 18   Ht 1.803 m (5' 11\")   Wt 101.5 kg (223 lb 11.2 oz)   SpO2 98%   BMI 31.20 kg/m     Estimated body mass index is 31.2 kg/m  as calculated from the following:    Height as of this encounter: 1.803 m (5' 11\").    Weight as of this encounter: 101.5 kg (223 lb 11.2 oz).    Physical Exam        General Appearance:    Alert, cooperative, no distress   Eyes:   No scleral icterus or conjunctival irritation       Ears:    Normal TM's and external ear canals, both ears   Throat:   Lips, mucosa, and tongue normal; teeth and gums normal   Neck:   Supple, symmetrical, trachea midline, no adenopathy;        thyroid:  No enlargement/tenderness/nodules   Lungs:     Clear to auscultation bilaterally, respirations unlabored, no wheezes or crackles   Heart:    Regular rate and rhythm,  No murmur   Abdomen:    Soft, no distention, no tenderness on palpation, no masses, no organomegaly     Extremities:  No edema, no joint swelling or redness, no evidence of any injuries   Skin:  No concerning skin findings, no suspicious moles, no rashes   Neurologic:  On gross examination there is no motor or sensory deficit.  Patient walks with a normal gait                   3/18/2024   Mini Cog   Clock Draw Score 2 Normal   3 Item Recall 3 objects recalled   Mini Cog Total Score 5              Signed Electronically by: " Elmo Crump MD, MD

## 2024-05-14 DIAGNOSIS — M25.512 CHRONIC PAIN OF BOTH SHOULDERS: ICD-10-CM

## 2024-05-14 DIAGNOSIS — M25.511 CHRONIC PAIN OF BOTH SHOULDERS: ICD-10-CM

## 2024-05-14 DIAGNOSIS — G89.29 CHRONIC PAIN OF BOTH SHOULDERS: ICD-10-CM

## 2024-05-14 RX ORDER — CELECOXIB 100 MG/1
CAPSULE ORAL
Qty: 90 CAPSULE | Refills: 3 | Status: SHIPPED | OUTPATIENT
Start: 2024-05-14

## 2024-05-23 DIAGNOSIS — K21.9 GASTROESOPHAGEAL REFLUX DISEASE WITHOUT ESOPHAGITIS: ICD-10-CM

## 2024-05-24 RX ORDER — LANSOPRAZOLE 30 MG/1
30 CAPSULE, DELAYED RELEASE ORAL DAILY
Qty: 90 CAPSULE | Refills: 0 | Status: SHIPPED | OUTPATIENT
Start: 2024-05-24 | End: 2024-08-19

## 2024-06-13 ENCOUNTER — TRANSFERRED RECORDS (OUTPATIENT)
Dept: HEALTH INFORMATION MANAGEMENT | Facility: CLINIC | Age: 69
End: 2024-06-13
Payer: COMMERCIAL

## 2024-07-19 ENCOUNTER — TELEPHONE (OUTPATIENT)
Dept: FAMILY MEDICINE | Facility: CLINIC | Age: 69
End: 2024-07-19

## 2024-07-19 ENCOUNTER — OFFICE VISIT (OUTPATIENT)
Dept: FAMILY MEDICINE | Facility: CLINIC | Age: 69
End: 2024-07-19
Payer: COMMERCIAL

## 2024-07-19 DIAGNOSIS — H43.812 POSTERIOR VITREOUS DETACHMENT OF LEFT EYE: ICD-10-CM

## 2024-07-19 DIAGNOSIS — Z01.818 PREOP GENERAL PHYSICAL EXAM: Primary | ICD-10-CM

## 2024-07-19 DIAGNOSIS — I10 ESSENTIAL HYPERTENSION: ICD-10-CM

## 2024-07-19 DIAGNOSIS — I73.9 PVD (PERIPHERAL VASCULAR DISEASE) (H): ICD-10-CM

## 2024-07-19 DIAGNOSIS — M10.9 GOUTY ARTHROPATHY: ICD-10-CM

## 2024-07-19 DIAGNOSIS — K21.9 GASTROESOPHAGEAL REFLUX DISEASE WITHOUT ESOPHAGITIS: ICD-10-CM

## 2024-07-19 DIAGNOSIS — H35.372 EPIRETINAL MEMBRANE (ERM) OF LEFT EYE: ICD-10-CM

## 2024-07-19 DIAGNOSIS — E78.2 MIXED HYPERLIPIDEMIA: ICD-10-CM

## 2024-07-19 DIAGNOSIS — H35.342 MACULAR HOLE OF LEFT EYE: ICD-10-CM

## 2024-07-19 LAB
ATRIAL RATE - MUSE: 80 BPM
DIASTOLIC BLOOD PRESSURE - MUSE: NORMAL MMHG
ERYTHROCYTE [DISTWIDTH] IN BLOOD BY AUTOMATED COUNT: 13.4 % (ref 10–15)
HCT VFR BLD AUTO: 40.3 % (ref 40–53)
HGB BLD-MCNC: 13.6 G/DL (ref 13.3–17.7)
INTERPRETATION ECG - MUSE: NORMAL
MCH RBC QN AUTO: 30.6 PG (ref 26.5–33)
MCHC RBC AUTO-ENTMCNC: 33.7 G/DL (ref 31.5–36.5)
MCV RBC AUTO: 91 FL (ref 78–100)
P AXIS - MUSE: 77 DEGREES
PLATELET # BLD AUTO: 162 10E3/UL (ref 150–450)
PR INTERVAL - MUSE: 160 MS
QRS DURATION - MUSE: 90 MS
QT - MUSE: 378 MS
QTC - MUSE: 435 MS
R AXIS - MUSE: 8 DEGREES
RBC # BLD AUTO: 4.44 10E6/UL (ref 4.4–5.9)
SYSTOLIC BLOOD PRESSURE - MUSE: NORMAL MMHG
T AXIS - MUSE: 48 DEGREES
VENTRICULAR RATE- MUSE: 80 BPM
WBC # BLD AUTO: 7.7 10E3/UL (ref 4–11)

## 2024-07-19 PROCEDURE — G2211 COMPLEX E/M VISIT ADD ON: HCPCS | Performed by: FAMILY MEDICINE

## 2024-07-19 PROCEDURE — 99214 OFFICE O/P EST MOD 30 MIN: CPT | Performed by: FAMILY MEDICINE

## 2024-07-19 PROCEDURE — 93010 ELECTROCARDIOGRAM REPORT: CPT | Performed by: GENERAL ACUTE CARE HOSPITAL

## 2024-07-19 PROCEDURE — 36415 COLL VENOUS BLD VENIPUNCTURE: CPT | Performed by: FAMILY MEDICINE

## 2024-07-19 PROCEDURE — 80048 BASIC METABOLIC PNL TOTAL CA: CPT | Performed by: FAMILY MEDICINE

## 2024-07-19 PROCEDURE — 93005 ELECTROCARDIOGRAM TRACING: CPT | Performed by: FAMILY MEDICINE

## 2024-07-19 PROCEDURE — 85027 COMPLETE CBC AUTOMATED: CPT | Performed by: FAMILY MEDICINE

## 2024-07-19 NOTE — PROGRESS NOTES
Preoperative Evaluation  [facility]  [address]  [facility] [address]  Phone: [phone]  Fax: [phone]  Primary Provider: Elmo Crump MD, MD  Pre-op Performing Provider: Elmo Crump MD, MD  Jul 19, 2024 7/14/2024   Surgical Information   What procedure is being done? vitrectomy, membrane peel, gas fluid exchange for macular hole   Facility or Hospital where procedure/surgery will be performed: [facility]   Who is doing the procedure / surgery? Dr. Juan A Robb   Date of surgery / procedure: 8-9-24   Time of surgery / procedure: 8:30   Where do you plan to recover after surgery? at home with family        Fax number for surgical facility: [phone] and     Assessment & Plan     The proposed surgical procedure is considered LOW risk.    Shabbir was seen today for pre-op exam and recheck medication.    Diagnoses and all orders for this visit:    Macular hole of left eye    Epiretinal membrane (ERM) of left eye    Posterior vitreous detachment of left eye    Essential hypertension  -     Basic metabolic panel; Future    Mixed hyperlipidemia    PVD (peripheral vascular disease) (H24)  -     CBC with platelets; Future  -     EKG 12-lead, tracing only    Gouty arthropathy           - No identified additional risk factors other than previously addressed    Antiplatelet or Anticoagulation Medication Instructions  He takes aspirin and cilostazol -based on preop information will continue medication but will double check with surgery team to make sure it does not need to be held for any length of time prior to this procedure.    Additional Medication Instructions  Take all other medications leading up to the procedure and the day of the procedure as usual.        Recommendation  Approval given to proceed with proposed procedure, without further diagnostic evaluation.    Subjective   Shabbir is a 69 year old, presenting for the  following:  Pre-Op Exam and Recheck Medication    He has reduced vision secondary to the above stated diagnosis he is scheduled for surgical intervention.    He has peripheral vascular disease, hypertension and dyslipidemia.  He has carotid artery occlusion.  He follows with vascular surgery.  He has no symptom concerns.  He had recent reassessment in January 2024 of carotids and peripheral vasculature.  He remains on the same medications.  He has good suppression of LDL cholesterol.  He has hypertension with good control of blood pressure on his current medication regiment.    He reports some recent mild acid reflux symptoms that likely can be controlled with continued medication and improved diet.     He has history of gout takes allopurinol 300 does not report any gout outbreaks.      He reports no signs or symptoms of an acute infection.  No prior history of any significant problems associated with surgery or anesthesia.        7/19/2024     1:16 PM   Additional Questions   Roomed by am cma         7/19/2024   Forms   Any forms needing to be completed Yes        HPI related to upcoming procedure:         7/14/2024   Pre-Op Questionnaire   Have you ever had a heart attack or stroke? No   Have you ever had surgery on your heart or blood vessels, such as a stent placement, a coronary artery bypass, or surgery on an artery in your head, neck, heart, or legs? (!) YES    Do you have chest pain with activity? No   Do you have a history of heart failure? No   Do you currently have a cold, bronchitis or symptoms of other infection? No   Do you have a cough, shortness of breath, or wheezing? No   Do you or anyone in your family have previous history of blood clots? No   Do you or does anyone in your family have a serious bleeding problem such as prolonged bleeding following surgeries or cuts? No   Have you ever had problems with anemia or been told to take iron pills? No   Have you had any abnormal blood loss such as  black, tarry or bloody stools? No   Have you ever had a blood transfusion? No   Are you willing to have a blood transfusion if it is medically needed before, during, or after your surgery? Yes   Have you or any of your relatives ever had problems with anesthesia? No   Do you have sleep apnea, excessive snoring or daytime drowsiness? No   Do you have any artifical heart valves or other implanted medical devices like a pacemaker, defibrillator, or continuous glucose monitor? No   Do you have artificial joints? (!) YES   Are you allergic to latex? No        Health Care Directive  Patient does not have a Health Care Directive or Living Will:     Preoperative Review of    reviewed - no record of controlled substances prescribed.          Patient Active Problem List    Diagnosis Date Noted    Gout 09/03/2021     Priority: Medium     Formatting of this note might be different from the original.  Created by Conversion    Replacement Utility updated for latest IMO load      History of colonic polyps 02/26/2020     Priority: Medium    PVD (peripheral vascular disease) (H24)      Priority: Medium    Mixed hyperlipidemia      Priority: Medium     Created by Conversion        Polyarthralgia 05/17/2017     Priority: Medium    Primary osteoarthritis involving multiple joints 05/17/2017     Priority: Medium    Benign neoplasm of descending colon 01/24/2017     Priority: Medium    Hemorrhoids 01/19/2017     Priority: Medium    Polyp of colon 01/19/2017     Priority: Medium    GERD (gastroesophageal reflux disease) 01/08/2017     Priority: Medium    Gout      Priority: Medium     Created by Conversion  Replacement Utility updated for latest IMO load        Dyspnea 05/27/2015     Priority: Medium    Neck Sprain      Priority: Medium     Created by Conversion        Cellulitis and abscess of leg 08/31/2006     Priority: Medium    Enthesopathy of knee 08/31/2006     Priority: Medium    Health examination of defined subpopulation  12/27/2005     Priority: Medium      Past Medical History:   Diagnosis Date    Cellulitis and abscess of leg 8/31/2006    Dyspnea 5/27/2015    Enthesopathy of knee 8/31/2006    GERD (gastroesophageal reflux disease) 1/8/2017    Health examination of defined subpopulation 12/27/2005    History of total hip arthroplasty, left 12/28/2018    Mixed hyperlipidemia     Created by Conversion     Polyarthralgia 5/17/2017    Primary osteoarthritis involving multiple joints 5/17/2017    Sprain of neck     Created by Conversion      Past Surgical History:   Procedure Laterality Date    ANGIOPLASTY Left 2007    IR EXTREMITY ANGIOGRAM BILATERAL  4/17/2007    IR EXTREMITY ANGIOGRAM RIGHT  8/23/2019    IR EXTREMITY ANGIOGRAM RIGHT  5/22/2020    IR EXTREMITY ANGIOGRAM RIGHT  8/23/2019    IR EXTREMITY ANGIOGRAM RIGHT  5/22/2020    TOTAL HIP ARTHROPLASTY Left 12/28/2019    TOTAL HIP ARTHROPLASTY Right      Current Outpatient Medications   Medication Sig Dispense Refill    allopurinol (ZYLOPRIM) 300 MG tablet TAKE 1 TABLET BY MOUTH ONE TIME DAILY 90 tablet 3    amLODIPine (NORVASC) 10 MG tablet TAKE 1 TABLET BY MOUTH ONCE DAILY 90 tablet 3    aspirin 81 MG EC tablet [ASPIRIN 81 MG EC TABLET] Take 81 mg by mouth daily.      celecoxib (CELEBREX) 100 MG capsule TAKE ONE CAPSULE BY MOUTH ONCE DAILY 90 capsule 3    cholecalciferol, vitamin D3, (VITAMIN D3) 400 unit cap [CHOLECALCIFEROL, VITAMIN D3, (VITAMIN D3) 400 UNIT CAP] Take 1 capsule by mouth daily.      cilostazol (PLETAL) 100 MG tablet TAKE 1 TABLET BY MOUTH TWICE DAILY 180 tablet 3    LANsoprazole (PREVACID) 30 MG DR capsule TAKE ONE CAPSULE BY MOUTH ONCE DAILY 90 capsule 0    lisinopril (ZESTRIL) 40 MG tablet Take 1 tablet (40 mg) by mouth daily 90 tablet 3    Multiple Vitamin (MULTI VITAMIN) TABS Take 1 tablet by mouth      rosuvastatin (CRESTOR) 40 MG tablet TAKE 1 TABLET BY MOUTH ONCE DAILY AT BEDTIME 90 tablet 2       No Known Allergies     Social History     Tobacco Use     "Smoking status: Former     Current packs/day: 0.00     Average packs/day: 1 pack/day for 39.0 years (39.0 ttl pk-yrs)     Types: Cigarettes     Start date: 6/1/1975     Quit date: 6/1/2014     Years since quitting: 10.1    Smokeless tobacco: Former     Quit date: 2/4/2005   Substance Use Topics    Alcohol use: Yes     Comment: Alcoholic Drinks/day: occasional on weekends       History   Drug Use    Types: Marijuana           Complete review of systems is obtained.  Other than the specific considerations noted above complete review of systems is negative.      Objective    There were no vitals taken for this visit.   Estimated body mass index is 31.2 kg/m  as calculated from the following:    Height as of 3/18/24: 1.803 m (5' 11\").    Weight as of 3/18/24: 101.5 kg (223 lb 11.2 oz).  Physical Exam        General Appearance:    Alert, cooperative, no distress   Eyes:   No scleral icterus or conjunctival irritation       Ears:    Normal TM's and external ear canals, both ears   Throat:   Lips, mucosa, and tongue normal; teeth and gums normal   Neck:   Supple, symmetrical, trachea midline, no adenopathy;        thyroid:  No enlargement/tenderness/nodules   Lungs:     Clear to auscultation bilaterally, respirations unlabored, no wheezes or crackles   Heart:    Regular rate and rhythm,  No murmur   Abdomen:    Soft, no distention, no tenderness on palpation, no masses, no organomegaly     Extremities:  No edema, no joint swelling or redness, no evidence of any injuries   Skin:  No concerning skin findings, no suspicious moles, no rashes   Neurologic:  On gross examination there is no motor or sensory deficit.  Patient walks with a normal gait               Recent Labs   Lab Test 03/18/24  1032      POTASSIUM 4.8   CR 0.99   A1C 5.4        Diagnostics  Labs pending will be reviewed when they return: CBC, BMP      Recent Results (from the past 24 hour(s))   EKG 12-lead, tracing only    Collection Time: 07/19/24  3:04 " PM   Result Value Ref Range    Systolic Blood Pressure  mmHg    Diastolic Blood Pressure  mmHg    Ventricular Rate 80 BPM    Atrial Rate 80 BPM    WI Interval 160 ms    QRS Duration 90 ms     ms    QTc 435 ms    P Axis 77 degrees    R AXIS 8 degrees    T Axis 48 degrees    Interpretation ECG       Sinus rhythm with sinus arrhythmia  Normal ECG  When compared with ECG of 11-JUN-2021 08:38,  No significant change was found          Revised Cardiac Risk Index (RCRI)  The patient has the following serious cardiovascular risks for perioperative complications:   - No serious cardiac risks = 0 points     RCRI Interpretation: 0 points: Class I (very low risk - 0.4% complication rate)         Signed Electronically by: Elmo Crump MD, MD  Copy of this evaluation report is provided to requesting physician.

## 2024-07-19 NOTE — TELEPHONE ENCOUNTER
Called and left a detailed message about surgery instructions.    Continue aspirin, no need to hold.    Cilostazol instructions:  Take on 8/6  Hold on 8/7  Hold on 8/8  Take on 8/9 after surgery.     I will send him a [employer] message also with instructions.

## 2024-07-20 LAB
ANION GAP SERPL CALCULATED.3IONS-SCNC: 12 MMOL/L (ref 7–15)
BUN SERPL-MCNC: 24.3 MG/DL (ref 8–23)
CALCIUM SERPL-MCNC: 9.4 MG/DL (ref 8.8–10.4)
CHLORIDE SERPL-SCNC: 108 MMOL/L (ref 98–107)
CREAT SERPL-MCNC: 1.08 MG/DL (ref 0.67–1.17)
EGFRCR SERPLBLD CKD-EPI 2021: 74 ML/MIN/1.73M2
GLUCOSE SERPL-MCNC: 100 MG/DL (ref 70–99)
HCO3 SERPL-SCNC: 21 MMOL/L (ref 22–29)
POTASSIUM SERPL-SCNC: 4.7 MMOL/L (ref 3.4–5.3)
SODIUM SERPL-SCNC: 141 MMOL/L (ref 135–145)

## 2024-08-14 DIAGNOSIS — I10 ESSENTIAL HYPERTENSION: ICD-10-CM

## 2024-08-14 RX ORDER — AMLODIPINE BESYLATE 10 MG/1
10 TABLET ORAL DAILY
Qty: 90 TABLET | Refills: 3 | Status: SHIPPED | OUTPATIENT
Start: 2024-08-14

## 2024-08-18 DIAGNOSIS — K21.9 GASTROESOPHAGEAL REFLUX DISEASE WITHOUT ESOPHAGITIS: ICD-10-CM

## 2024-08-19 RX ORDER — LANSOPRAZOLE 30 MG/1
30 CAPSULE, DELAYED RELEASE ORAL DAILY
Qty: 90 CAPSULE | Refills: 2 | Status: SHIPPED | OUTPATIENT
Start: 2024-08-19

## 2024-10-08 ENCOUNTER — TELEPHONE (OUTPATIENT)
Dept: VASCULAR SURGERY | Facility: CLINIC | Age: 69
End: 2024-10-08
Payer: COMMERCIAL

## 2024-10-08 NOTE — TELEPHONE ENCOUNTER
Spoke with patient. Requested a call back in a week or so to schedule appts; due around Jan. [phone]  ________________________________  Carol Guerra RN  [facility]  Please call to follow up in one year with repeat ultrasound and clinic visit. Thank you   hard copy, drawn during this pregnancy

## 2024-11-26 ENCOUNTER — PATIENT OUTREACH (OUTPATIENT)
Dept: GASTROENTEROLOGY | Facility: CLINIC | Age: 69
End: 2024-11-26
Payer: COMMERCIAL

## 2024-11-26 DIAGNOSIS — Z12.11 SPECIAL SCREENING FOR MALIGNANT NEOPLASMS, COLON: Primary | ICD-10-CM

## 2024-11-26 DIAGNOSIS — E78.2 MIXED HYPERLIPIDEMIA: ICD-10-CM

## 2024-11-26 DIAGNOSIS — I73.9 PVD (PERIPHERAL VASCULAR DISEASE) (H): ICD-10-CM

## 2024-11-26 DIAGNOSIS — M10.9 GOUTY ARTHROPATHY: ICD-10-CM

## 2024-11-26 RX ORDER — CILOSTAZOL 100 MG/1
TABLET ORAL
Qty: 180 TABLET | Refills: 3 | Status: SHIPPED | OUTPATIENT
Start: 2024-11-26

## 2024-11-26 RX ORDER — ALLOPURINOL 300 MG/1
1 TABLET ORAL DAILY
Qty: 90 TABLET | Refills: 3 | Status: SHIPPED | OUTPATIENT
Start: 2024-11-26

## 2024-11-26 RX ORDER — ROSUVASTATIN CALCIUM 40 MG/1
40 TABLET, COATED ORAL AT BEDTIME
Qty: 90 TABLET | Refills: 1 | Status: SHIPPED | OUTPATIENT
Start: 2024-11-26

## 2024-11-26 NOTE — PROGRESS NOTES
"Hx adenomatous polyps with 5yr recall recommended on last colonoscopy performed in 2020    [facility] Screening Colonoscopy Referral Review    Patient meets the inclusion criteria for screening colonoscopy standing order.    Ordering/Referring Provider:  Elmo Crump     BMI: Estimated body mass index is 31.2 kg/m  as calculated from the following:    Height as of 3/18/24: 1.803 m (5' 11\").    Weight as of 3/18/24: 101.5 kg (223 lb 11.2 oz).     Sedation:  Does patient have any of the following conditions affecting sedation?  No medical conditions affecting sedation.    Previous Scopes:  Any previous recommendations or follow up needs based on previous scope?  na / No recommendations.    Medical Concerns to Postpone Order:  Does patient have any of the following medical concerns that should postpone/delay colonoscopy referral?  No medical conditions affecting colonoscopy referral.    Final Referral Details:  Based on patient's medical history patient is appropriate for referral order with moderate sedation. If patient's BMI > 50 do not schedule in [facility].  "

## 2025-01-20 NOTE — PROGRESS NOTES
[facility]        Patient is here for a 1 year follow up  to discuss PAD S/P right SFA stent and carotid surveillance with known left ICA occlusion. US PRIOR.     Pt is currently taking Aspirin, statin and Pletal.    At home 's systolic over 80 diastolic    BP (!) 167/96   Pulse 98   Temp (!) 96.4  F (35.8  C)   Resp 16     The provider has been notified that the patient has no concerns.     Questions patient would like addressed today are: N/A.    Refills are needed: No    Has homecare services and agency name:  No

## 2025-01-20 NOTE — PATIENT INSTRUCTIONS
Sonia Razo,    Thank you for entrusting your care with us today. After your visit today with LATOYA Stroud this is the plan that was discussed at your appointment.    Follow up in 1 year for repeat ultrasounds and clinic visit.    We will call you closer to this date to get you scheduled.        I am including additional information on these things and our contact information if you have any questions or concerns.   Please do not hesitate to reach out to us if you felt we did not answer your questions or you are unsure of the treatment plan after your visit today. Our number is [phone].Thank you for trusting us with your care.         Again thank you for your time.     Carotid Duplex    Steps for the test are:  You will be asked to lie on a stretcher. It will be okay for you to wear your street clothes. In some situations, you may be asked to change into a gown.    Ultrasound gel, usually warmed for your comfort, will be placed on either side of your neck.    Through the gel, the technician will apply to your neck a small hand-held device that emits sound waves.   When the test is completed, the technician will remove excess gel from your neck. The gel is water-soluble and will not stain your skin or clothes.    How to Prepare:  Eat and take medications as usual.   Wear minimal or no jewelry to ease application and removal of gel.    Risks  There are typically no side effects or complications associated with a carotid duplex ultrasound examination.    What Can I Expect After the Test?  The technician will send the ultrasound images to your vascular surgeon for evaluation. Typically, a report is available in 2-3 days. If anything critical is found, it is standard practice to notify the vascular surgeon immediately.  Reference: https://vascular.org/patient-resources/vascular-tests           Ankle-Brachial Index (MACK) or Physiologic Test    Description  An ankle-brachial index test is relatively pain free.  Blood pressure cuffs of various sizes are placed on your thigh, calf, foot and toes.  Similar to having your blood pressure checked with an arm cuff, as the technician inflates the cuffs, they progressively tighten and are then quickly released.  You may feel some discomfort, but generally for less than 60 seconds for each measurement. You will be asked to remove your socks and shoes and possibly your pants or shorts. Gowns will be provided. It usually takes about 30-60 minutes.   Depending on the initial readings and patient symptoms, you may be asked to perform a light walk on a treadmill.  The technician will apply ultrasound gel, usually warmed for your comfort, to your ankles and wrists. Through the gel, the technician will use a small hand-held device that emits sound waves.  Risks  There are typically no side effects or complications associated with a physiologic study.  How to Prepare  Eat and take medications as usual.  There is no preparation required for an ankle-brachial index (MACK) or physiologic exam.  What Can I Expect After the Test?  The technician will send the ultrasound images to your vascular surgeon for evaluation. Typically, a report is available in 2-3 days. If anything critical is found, it is standard practice to notify the vascular surgeon immediately.  Reference: https://vascular.org/patient-resources/vascular-tests     Lower Extremity Arterial Ultrasound    Description  Ultrasound examinations are painless and easy for the patient. The vascular laboratory will contain a bed and just two or three pieces of equipment. You will be asked to remove pants or shorts and gowns will be provided. It usually takes about 30 minutes.  The technician will tuck a towel under your underpants in the groin. The gel is water-soluble and will not stain your skin or clothes.  Ultrasound gel, usually warmed for your comfort, will be placed on the inner side of your legs.    Through the gel, the technician  will apply to your legs a small hand-held device that emits sound waves.  When the test is completed, the technician will remove excess gel from your legs.    Risks  There are typically no side effects or complications associated with a lower extremity arterial duplex ultrasound.  How to Prepare  Eat and take medications as usual.  There is no preparation required for a lower extremity arterial duplex ultrasound.  What Can I Expect After the Test?  The technician will send the ultrasound images to your vascular surgeon for evaluation. Typically, a report is available in 2-3 days. If anything critical is found, it is standard practice to notify the vascular surgeon immediately.  Reference: https://vascular.org/patient-resources/vascular-tests

## 2025-01-27 ENCOUNTER — ANCILLARY PROCEDURE (OUTPATIENT)
Dept: VASCULAR ULTRASOUND | Facility: CLINIC | Age: 70
End: 2025-01-27
Attending: PHYSICIAN ASSISTANT
Payer: COMMERCIAL

## 2025-01-27 ENCOUNTER — OFFICE VISIT (OUTPATIENT)
Dept: VASCULAR SURGERY | Facility: CLINIC | Age: 70
End: 2025-01-27
Attending: PHYSICIAN ASSISTANT
Payer: COMMERCIAL

## 2025-01-27 VITALS
SYSTOLIC BLOOD PRESSURE: 167 MMHG | HEART RATE: 98 BPM | DIASTOLIC BLOOD PRESSURE: 96 MMHG | RESPIRATION RATE: 16 BRPM | TEMPERATURE: 96.4 F

## 2025-01-27 DIAGNOSIS — I65.22 STENOSIS OF LEFT CAROTID ARTERY: Primary | ICD-10-CM

## 2025-01-27 DIAGNOSIS — I65.22 STENOSIS OF LEFT CAROTID ARTERY: ICD-10-CM

## 2025-01-27 DIAGNOSIS — I73.9 PVD (PERIPHERAL VASCULAR DISEASE): ICD-10-CM

## 2025-01-27 PROCEDURE — 93925 LOWER EXTREMITY STUDY: CPT | Mod: 26 | Performed by: SURGERY

## 2025-01-27 PROCEDURE — 93925 LOWER EXTREMITY STUDY: CPT

## 2025-01-27 PROCEDURE — 93924 LWR XTR VASC STDY BILAT: CPT

## 2025-01-27 PROCEDURE — 93880 EXTRACRANIAL BILAT STUDY: CPT

## 2025-01-27 PROCEDURE — 99214 OFFICE O/P EST MOD 30 MIN: CPT | Performed by: PHYSICIAN ASSISTANT

## 2025-01-27 PROCEDURE — 93880 EXTRACRANIAL BILAT STUDY: CPT | Mod: 26 | Performed by: SURGERY

## 2025-01-27 PROCEDURE — G0463 HOSPITAL OUTPT CLINIC VISIT: HCPCS | Performed by: PHYSICIAN ASSISTANT

## 2025-01-27 PROCEDURE — 93924 LWR XTR VASC STDY BILAT: CPT | Mod: 26 | Performed by: SURGERY

## 2025-01-27 RX ORDER — NEOMYCIN SULFATE, POLYMYXIN B SULFATE AND DEXAMETHASONE 3.5; 10000; 1 MG/ML; [USP'U]/ML; MG/ML
SUSPENSION/ DROPS OPHTHALMIC
COMMUNITY
Start: 2024-07-23

## 2025-01-27 ASSESSMENT — PAIN SCALES - GENERAL: PAINLEVEL_OUTOF10: NO PAIN (0)

## 2025-01-27 NOTE — PROGRESS NOTES
VASCULAR SURGERY PROGRESS NOTE    LOCATION:  [facility]     Dung Negrete  Medical Record #: [id number]  YOB: 1955  Age: 70 year old     Date of Service: 1/27/2025    PRIMARY CARE PROVIDER: Elmo Crump    Reason for visit: Surveillance of carotid disease and PAD    IMPRESSION: 68-year-old male presenting for follow-up of carotid artery stenosis and peripheral arterial disease status post right SFA stent for short distance claudication. Carotid duplex shows less than 50% stenosis of the right ICA and a known left ICA occlusion. Remains completely asymptomatic.     From a lower extremity standpoint, ABIs are stable at 1.21 on the right and 1.12 on the left with toe pressures adequate for wound healing bilaterally. Duplex demonstrates a patent right SFA stent with multiphasic flow throughout.  He has a continued stenosis at the mid SFA on the left with velocities up to 308 cm/s but biphasic flow distal to this. Patient continues to experience stable claudication of the left lower extremity. Denies any rest pain or wounds. Medically optimized.    RECOMMENDATION/RISKS: Continue best medical management with aspirin, statin, and Pletal.  Encouraged regular activity/ambulation.  Follow-up in 1 year with repeat studies.     HPI:  Dung Negrete is a 70 year old male with past medical history significant for  hyperlipidemia, GERD, and peripheral arterial disease status post right SFA stent for short distance claudication in 2020.  Patient was last seen in the vascular clinic 1 year ago and was noted to be doing well with manageable claudication of the left leg and stable imaging findings.    Today, Mr. Negrete presents for follow up. He is doing well and denies any vision changes, slurred speech, facial asymmetry, unilateral extremity numbness or weakness, or other signs/symptoms of TIA or stroke. From a lower extremity standpoint he continues to have some mild claudication of the left  leg after walking for some distance. Patient does not feel limited by this and is able to do everything he needs to be able to do. He denies any pain at rest or nonhealing lower extremity wounds. Continues to experience back pain/sciatica and has met with a spine surgeon regarding this.  Patient is compliant with his aspirin, statin, and cilostazol.    Ultrasound results were discussed and all questions answered.  No other concerns    REVIEW OF SYSTEMS:    A 12 point ROS was reviewed and is negative except for what is listed above in HPI.    PHH:    Past Medical History:   Diagnosis Date    Cellulitis and abscess of leg 8/31/2006    Dyspnea 5/27/2015    Enthesopathy of knee 8/31/2006    GERD (gastroesophageal reflux disease) 1/8/2017    Health examination of defined subpopulation 12/27/2005    History of total hip arthroplasty, left 12/28/2018    Mixed hyperlipidemia     Created by Conversion     Polyarthralgia 5/17/2017    Primary osteoarthritis involving multiple joints 5/17/2017    Sprain of neck     Created by Conversion       Past Surgical History:   Procedure Laterality Date    ANGIOPLASTY Left 2007    IR EXTREMITY ANGIOGRAM BILATERAL  4/17/2007    IR EXTREMITY ANGIOGRAM RIGHT  8/23/2019    IR EXTREMITY ANGIOGRAM RIGHT  5/22/2020    IR EXTREMITY ANGIOGRAM RIGHT  8/23/2019    IR EXTREMITY ANGIOGRAM RIGHT  5/22/2020    TOTAL HIP ARTHROPLASTY Left 12/28/2019    TOTAL HIP ARTHROPLASTY Right      ALLERGIES:  Patient has no known allergies.    MEDS:    Current Outpatient Medications:     allopurinol (ZYLOPRIM) 300 MG tablet, TAKE 1 TABLET BY MOUTH ONCE DAILY, Disp: 90 tablet, Rfl: 3    amLODIPine (NORVASC) 10 MG tablet, TAKE 1 TABLET BY MOUTH ONCE DAILY, Disp: 90 tablet, Rfl: 3    aspirin 81 MG EC tablet, [ASPIRIN 81 MG EC TABLET] Take 81 mg by mouth daily., Disp: , Rfl:     celecoxib (CELEBREX) 100 MG capsule, TAKE ONE CAPSULE BY MOUTH ONCE DAILY, Disp: 90 capsule, Rfl: 3    cholecalciferol, vitamin D3, (VITAMIN  D3) 400 unit cap, [CHOLECALCIFEROL, VITAMIN D3, (VITAMIN D3) 400 UNIT CAP] Take 1 capsule by mouth daily., Disp: , Rfl:     cilostazol (PLETAL) 100 MG tablet, TAKE 1 TABLET BY MOUTH TWICE A DAY, Disp: 180 tablet, Rfl: 3    LANsoprazole (PREVACID) 30 MG DR capsule, TAKE ONE CAPSULE BY MOUTH ONCE DAILY, Disp: 90 capsule, Rfl: 2    lisinopril (ZESTRIL) 40 MG tablet, Take 1 tablet (40 mg) by mouth daily, Disp: 90 tablet, Rfl: 3    Multiple Vitamin (MULTI VITAMIN) TABS, Take 1 tablet by mouth, Disp: , Rfl:     rosuvastatin (CRESTOR) 40 MG tablet, Take 1 tablet (40 mg) by mouth at bedtime., Disp: 90 tablet, Rfl: 1    SOCIAL HABITS:    History   Smoking Status    Former    Packs/day: 1.00    Years: 39.00    Types: Cigarettes    Quit date: 6/1/2014   Smokeless Tobacco    Former    Quit date: 2/4/2005     Social History    Substance and Sexual Activity      Alcohol use: Yes        Comment: Alcoholic Drinks/day: occasional on weekends      History   Drug Use    Types: Marijuana     FAMILY HISTORY:    Family History   Problem Relation Age of Onset    No Known Problems Mother     Heart Disease Father     Hypertension Father     Diabetes Sister     Heart Disease Brother     Diabetes Brother     Coronary Artery Disease Brother     Diabetes Sister      PE:  There were no vitals taken for this visit.  Wt Readings from Last 1 Encounters:   03/18/24 101.5 kg (223 lb 11.2 oz)     There is no height or weight on file to calculate BMI.    EXAM:  GENERAL: well-developed 70 year old male who appears his stated age  CARDIAC: normal   CHEST/LUNG: normal respiratory effort   MUSCULOSKELETAL: grossly normal and both lower extremities are intact, no lower extremity edema  NEUROLOGIC: focally intact, alert and oriented x 3  PSYCH: appropriate affect    DIAGNOSTIC STUDIES:     Images:    US Carotid Bilateral     I personally reviewed the images and my interpretation is less than 50% stenosis of the right ICA and a known left ICA occlusion    US  L Ext Arterial Doppler Seg Pres w Exer Sumanth   US Lower Extremity Arterial Duplex Bilateral     I personally reviewed the images and my interpretation is ABIs are stable at 1.21 on the right and 1.12 on the left with toe pressures adequate for wound healing bilaterally. Duplex demonstrates a patent right SFA stent with multiphasic flow throughout.  He has a continued stenosis at the mid SFA on the left with velocities up to 308 cm/s but biphasic flow distal to this    LABS:      Sodium   Date Value Ref Range Status   07/19/2024 141 135 - 145 mmol/L Final   03/18/2024 139 135 - 145 mmol/L Final     Comment:     Reference intervals for this test were updated on 09/26/2023 to more accurately reflect our healthy population. There may be differences in the flagging of prior results with similar values performed with this method. Interpretation of those prior results can be made in the context of the updated reference intervals.    02/06/2023 138 136 - 145 mmol/L Final     Urea Nitrogen   Date Value Ref Range Status   07/19/2024 24.3 (H) 8.0 - 23.0 mg/dL Final   03/18/2024 16.6 8.0 - 23.0 mg/dL Final   02/06/2023 20.9 8.0 - 23.0 mg/dL Final   09/07/2021 17 8 - 22 mg/dL Final   08/25/2020 18 8 - 22 mg/dL Final   06/18/2019 16 8 - 22 mg/dL Final     Hemoglobin   Date Value Ref Range Status   07/19/2024 13.6 13.3 - 17.7 g/dL Final   05/22/2020 14.4 14.0 - 18.0 g/dL Final   08/23/2019 15.6 14.0 - 18.0 g/dL Final     Platelet Count   Date Value Ref Range Status   07/19/2024 162 150 - 450 10e3/uL Final   05/22/2020 154 140 - 440 thou/uL Final   08/23/2019 161 140 - 440 thou/uL Final     INR   Date Value Ref Range Status   05/22/2020 1.01 0.90 - 1.10 Final   08/23/2019 1.00 0.90 - 1.10 Final     30 minutes spent on the day of encounter doing chart review, history and exam, documentation, and further activities as noted.     Shauna Stroud PA-C  VASCULAR SURGERY

## 2025-02-10 ENCOUNTER — TELEPHONE (OUTPATIENT)
Dept: GASTROENTEROLOGY | Facility: CLINIC | Age: 70
End: 2025-02-10
Payer: COMMERCIAL

## 2025-02-10 NOTE — TELEPHONE ENCOUNTER
"Endoscopy Scheduling Screen    Have you had any respiratory illness or flu-like symptoms in the last 10 days?  No    What is your communication preference for Instructions and/or Bowel Prep?   MyChart    What insurance is in the chart?  Other:  [facility]    Ordering/Referring Provider: TREY AGUERO   (If ordering provider performs procedure, schedule with ordering provider unless otherwise instructed. )    BMI: Estimated body mass index is 31.2 kg/m  as calculated from the following:    Height as of 3/18/24: 1.803 m (5' 11\").    Weight as of 3/18/24: 101.5 kg (223 lb 11.2 oz).     Sedation Ordered  moderate sedation.   If patient BMI > 50 do not schedule in [facility].    If patient [facility] > 45 [facility]    Are you taking methadone or Suboxone?  NO, No RN review required.    Have you been diagnosed and are being treated for severe PTSD or severe anxiety?  NO, No RN review required.    Are you taking any prescription medications for pain 3 or more times per week?   NO, No RN review required.    Do you have a history of malignant hyperthermia?  No    (Females) Are you currently pregnant?   No     Have you been diagnosed or told you have pulmonary hypertension?   No    Do you have an LVAD?  No    Have you been told you have moderate to severe sleep apnea?  No.    Have you been told you have COPD, asthma, or any other lung disease?  No    Do you have any heart conditions?  No     Have you ever had or are you waiting for an organ transplant?  No. Continue scheduling, no site restrictions.    Have you had a stroke or transient ischemic attack (TIA aka \"mini stroke\" in the last 6 months?   No    Have you been diagnosed with or been told you have cirrhosis of the liver?   No.    Are you currently on dialysis?   No    Do you need assistance transferring?   No    BMI: Estimated body mass index is 31.2 kg/m  as calculated from the following:    Height as of 3/18/24: 1.803 m (5' 11\").    Weight as of 3/18/24: 101.5 kg (223 " lb 11.2 oz).     Is patients BMI > 40 and scheduling location [facility]?  No    Do you take an injectable or oral medication for weight loss or diabetes (excluding insulin)?  No    Do you take the medication Naltrexone?  No    Do you take blood thinners?  Yes, you must contact your prescribing provider for directions on holding or bridging with a different medication.       Prep   Are you currently on dialysis or do you have chronic kidney disease?  No    Do you have a diagnosis of diabetes?  No    Do you have a diagnosis of cystic fibrosis (CF)?  No    On a regular basis do you go 3 -5 days between bowel movements?  No    BMI > 40?  No    Preferred Pharmacy:    [id number] - [place], [place] - [address]  [facility] [address]  Phone: [phone] Fax: [phone]      Final Scheduling Details     Procedure scheduled  Colonoscopy    Surgeon:  CARMEN     Date of procedure:  3/25/25     Pre-OP / PAC:   No - Not required for this site.    Location  [facility] - Patient preference.    Sedation   MAC/Deep Sedation  PER LOCATION      Patient Reminders:   You will receive a call from a Nurse to review instructions and health history.  This assessment must be completed prior to your procedure.  Failure to complete the Nurse assessment may result in the procedure being cancelled.      On the day of your procedure, please designate an adult(s) who can drive you home stay with you for the next 24 hours. The medicines used in the exam will make you sleepy. You will not be able to drive.      You cannot take public transportation, ride share services, or non-medical taxi service without a responsible caregiver.  Medical transport services are allowed with the requirement that a responsible caregiver will receive you at your destination.  We require that drivers and caregivers are confirmed prior to your procedure.

## 2025-02-12 ENCOUNTER — MYC MEDICAL ADVICE (OUTPATIENT)
Dept: FAMILY MEDICINE | Facility: CLINIC | Age: 70
End: 2025-02-12
Payer: COMMERCIAL

## 2025-02-12 NOTE — TELEPHONE ENCOUNTER
Routed to Luther Lockwood and supporting nurse pool for general surgery to advise on medications prior to colonoscopy.    Caryn Langford RN  [facility]

## 2025-02-12 NOTE — TELEPHONE ENCOUNTER
Please determine if gastroenterology recommends stopping Asprin and/or cilostazol for colonoscopy.   Complex Repair And O-L Flap Text: The defect edges were debeveled with a #15 scalpel blade.  The primary defect was closed partially with a complex linear closure.  Given the location of the remaining defect, shape of the defect and the proximity to free margins an O-L flap was deemed most appropriate for complete closure of the defect.  Using a sterile surgical marker, an appropriate flap was drawn incorporating the defect and placing the expected incisions within the relaxed skin tension lines where possible.    The area thus outlined was incised deep to adipose tissue with a #15 scalpel blade.  The skin margins were undermined to an appropriate distance in all directions utilizing iris scissors.

## 2025-02-17 ENCOUNTER — PATIENT OUTREACH (OUTPATIENT)
Dept: CARE COORDINATION | Facility: CLINIC | Age: 70
End: 2025-02-17
Payer: COMMERCIAL

## 2025-03-03 ENCOUNTER — PATIENT OUTREACH (OUTPATIENT)
Dept: CARE COORDINATION | Facility: CLINIC | Age: 70
End: 2025-03-03
Payer: COMMERCIAL

## 2025-03-05 ENCOUNTER — TELEPHONE (OUTPATIENT)
Dept: GASTROENTEROLOGY | Facility: CLINIC | Age: 70
End: 2025-03-05
Payer: COMMERCIAL

## 2025-03-05 NOTE — TELEPHONE ENCOUNTER
Bowel Prep Review:  Disclaimer: No call was made to the patient.     Standard Miralax bowel prep.    Recommended due to standard bowel prep.   Instructions were sent via [employer].       Maryam Barnhart RN  Endoscopy Procedure Pre Assessment

## 2025-03-24 ENCOUNTER — ANESTHESIA EVENT (OUTPATIENT)
Dept: SURGERY | Facility: AMBULATORY SURGERY CENTER | Age: 70
End: 2025-03-24
Payer: COMMERCIAL

## 2025-03-25 ENCOUNTER — HOSPITAL ENCOUNTER (OUTPATIENT)
Facility: AMBULATORY SURGERY CENTER | Age: 70
Discharge: HOME | End: 2025-03-25
Attending: SURGERY
Payer: COMMERCIAL

## 2025-03-25 ENCOUNTER — ANESTHESIA (OUTPATIENT)
Dept: SURGERY | Facility: AMBULATORY SURGERY CENTER | Age: 70
End: 2025-03-25
Payer: COMMERCIAL

## 2025-03-25 VITALS
DIASTOLIC BLOOD PRESSURE: 79 MMHG | TEMPERATURE: 97.3 F | HEIGHT: 71 IN | OXYGEN SATURATION: 93 % | RESPIRATION RATE: 16 BRPM | WEIGHT: 220 LBS | SYSTOLIC BLOOD PRESSURE: 152 MMHG | HEART RATE: 62 BPM | BODY MASS INDEX: 30.8 KG/M2

## 2025-03-25 DIAGNOSIS — Z12.11 SPECIAL SCREENING FOR MALIGNANT NEOPLASMS, COLON: ICD-10-CM

## 2025-03-25 PROCEDURE — 45380 COLONOSCOPY AND BIOPSY: CPT | Mod: PT | Performed by: SURGERY

## 2025-03-25 RX ORDER — ONDANSETRON 2 MG/ML
4 INJECTION INTRAMUSCULAR; INTRAVENOUS EVERY 30 MIN PRN
Status: DISCONTINUED | OUTPATIENT
Start: 2025-03-25 | End: 2025-03-26 | Stop reason: HOSPADM

## 2025-03-25 RX ORDER — NALOXONE HYDROCHLORIDE 0.4 MG/ML
0.1 INJECTION, SOLUTION INTRAMUSCULAR; INTRAVENOUS; SUBCUTANEOUS
Status: DISCONTINUED | OUTPATIENT
Start: 2025-03-25 | End: 2025-03-26 | Stop reason: HOSPADM

## 2025-03-25 RX ORDER — PROPOFOL 10 MG/ML
INJECTION, EMULSION INTRAVENOUS CONTINUOUS PRN
Status: DISCONTINUED | OUTPATIENT
Start: 2025-03-25 | End: 2025-03-25

## 2025-03-25 RX ORDER — SODIUM CHLORIDE, SODIUM LACTATE, POTASSIUM CHLORIDE, CALCIUM CHLORIDE 600; 310; 30; 20 MG/100ML; MG/100ML; MG/100ML; MG/100ML
INJECTION, SOLUTION INTRAVENOUS CONTINUOUS
Status: DISCONTINUED | OUTPATIENT
Start: 2025-03-25 | End: 2025-03-26 | Stop reason: HOSPADM

## 2025-03-25 RX ORDER — FENTANYL CITRATE 0.05 MG/ML
25 INJECTION, SOLUTION INTRAMUSCULAR; INTRAVENOUS
Status: DISCONTINUED | OUTPATIENT
Start: 2025-03-25 | End: 2025-03-26 | Stop reason: HOSPADM

## 2025-03-25 RX ORDER — LIDOCAINE 40 MG/G
CREAM TOPICAL
Status: DISCONTINUED | OUTPATIENT
Start: 2025-03-25 | End: 2025-03-26 | Stop reason: HOSPADM

## 2025-03-25 RX ORDER — DEXAMETHASONE SODIUM PHOSPHATE 4 MG/ML
4 INJECTION, SOLUTION INTRA-ARTICULAR; INTRALESIONAL; INTRAMUSCULAR; INTRAVENOUS; SOFT TISSUE
Status: DISCONTINUED | OUTPATIENT
Start: 2025-03-25 | End: 2025-03-26 | Stop reason: HOSPADM

## 2025-03-25 RX ORDER — GLYCOPYRROLATE 0.2 MG/ML
INJECTION, SOLUTION INTRAMUSCULAR; INTRAVENOUS PRN
Status: DISCONTINUED | OUTPATIENT
Start: 2025-03-25 | End: 2025-03-25

## 2025-03-25 RX ORDER — ONDANSETRON 2 MG/ML
4 INJECTION INTRAMUSCULAR; INTRAVENOUS
Status: DISCONTINUED | OUTPATIENT
Start: 2025-03-25 | End: 2025-03-26 | Stop reason: HOSPADM

## 2025-03-25 RX ORDER — ONDANSETRON 4 MG/1
4 TABLET, ORALLY DISINTEGRATING ORAL EVERY 30 MIN PRN
Status: DISCONTINUED | OUTPATIENT
Start: 2025-03-25 | End: 2025-03-26 | Stop reason: HOSPADM

## 2025-03-25 RX ADMIN — GLYCOPYRROLATE 0.2 MG: 0.2 INJECTION, SOLUTION INTRAMUSCULAR; INTRAVENOUS at 10:55

## 2025-03-25 RX ADMIN — PROPOFOL 140 MCG/KG/MIN: 10 INJECTION, EMULSION INTRAVENOUS at 10:55

## 2025-03-25 RX ADMIN — SODIUM CHLORIDE, SODIUM LACTATE, POTASSIUM CHLORIDE, CALCIUM CHLORIDE: 600; 310; 30; 20 INJECTION, SOLUTION INTRAVENOUS at 09:13

## 2025-03-25 NOTE — ANESTHESIA POSTPROCEDURE EVALUATION
Patient: Dung Negrete    Procedure: Procedure(s):  COLONOSCOPY WITH POLYPECTOMY       Anesthesia Type:  MAC    Note:     Postop Pain Control: Uneventful            Sign Out: Well controlled pain   PONV: No   Neuro/Psych: Uneventful            Sign Out: Acceptable/Baseline neuro status   Airway/Respiratory: Uneventful            Sign Out: Acceptable/Baseline resp. status   CV/Hemodynamics: Uneventful            Sign Out: Acceptable CV status; No obvious hypovolemia; No obvious fluid overload   Other NRE: NONE   DID A NON-ROUTINE EVENT OCCUR? No           Last vitals:  Vitals Value Taken Time   /70 03/25/25 1146   Temp 97.3  F (36.3  C) 03/25/25 1115   Pulse 77 03/25/25 1154   Resp 16 03/25/25 1115   SpO2 91 % 03/25/25 1154   Vitals shown include unfiled device data.    Electronically Signed By: Julio Weathers MD  March 25, 2025  12:05 PM

## 2025-03-25 NOTE — DISCHARGE INSTRUCTIONS
If you have any questions or concerns regarding your procedure please contact Dr. Lockwood, his office number is [phone]    Discharge Instructions:    Take you medications as directed and follow up with you primary provider as scheduled.   You should expect to pass air from your rectum after the procedure.   Follow these care guidelines during your recovery for the next 24 hours.   If you have any questions or concerns please contact the provider that performed your procedure.     You were given medicine for sedation:  You have been given medicines during your procedure that might make you sleepy and weak. To prevent problems:    *Rest for the rest of the day after you are home. You should be back to your normal activity tomorrow.  *For the next 24 hours:   -Do not drink alcoholic beverages.   -Do not make any important decisions or sign any legal forms.   -Do not work around machinery or power equipment.     The medicines used for sedation may make you feel nauseated.   *Start with clear liquids, such as tea, jell-o, broth and ginger ale. As you feel better you may add soft foods such as pudding and ice cream.   *When you no longer feel nauseated, you may try your normal diet.     You should be back to eating your normal after 24 hours.     Call if you have any of these problems:  *Fever of 101 degree F or 38 degrees C  *Bleeding from the rectum  *Black stool or blood in your bowel movements  *Nausea with vomiting that does not ease after a few hours.  *Abdominal pain or bloating  *Fainting

## 2025-03-25 NOTE — OP NOTE
COLONOSCOPY REPORT      Pre-op Dx:              Screening colonoscopy      Procedure:             Colonoscopy with removal of polyps x 3      Indications:            Colon Cancer Screening      Findings:                3 small Low Profile polyps were found in the sigmoid colon and the rectum    Procedure:              The patient is brought to the endoscopy suite placed in a lateral position and the patient is given conscious sedation anesthesia.  The colonoscope was advanced atraumatically into the anus taken all way up and around to the cecum.  The ileocecal valve and the appendiceal orifice are clearly identified.  The scope was slowly drawn back and polyps were identified in the sigmoid colon at 45 cm and in the rectum.  Each of these polyps was 3 mm in diameter and Low Profile.  And it was removed with cold biopsy forceps.  No other obvious lesions were noted throughout the colon.  The patient did not have sigmoid diverticular disease.   The scope was drawn back into the rectum and retroflexed I do not see evidence for any significant hemorrhoidal disease.  The colonoscope was straightened and taken up to the splenic flexure areas aspirated from the left side of the colon as the scope was withdrawn.    Withdrawal Time: 8 minutes    EBL:                        None      Medications:         MAC; see anesthesia note for details          Complications:      None      Post-op Dx:            3 small polyps noted on this colonoscopy      Recommendation:   Next Colonoscopy in 5 years; the year 2030      Luther Lockwood MD  3/25/2025 11:11 AM  [facility]  [phone]

## 2025-03-25 NOTE — ANESTHESIA CARE TRANSFER NOTE
Patient: Dung Negrete    Procedure: Procedure(s):  COLONOSCOPY WITH POLYPECTOMY       Diagnosis: Special screening for malignant neoplasms, colon [Z12.11]  Diagnosis Additional Information: No value filed.    Anesthesia Type:   MAC     Note:    Oropharynx: oropharynx clear of all foreign objects  Level of Consciousness: awake and drowsy  Oxygen Supplementation: blow-by O2 and face mask  Level of Supplemental Oxygen (L/min / FiO2): 8  Independent Airway: airway patency satisfactory and stable  Dentition: dentition unchanged  Vital Signs Stable: post-procedure vital signs reviewed and stable  Report to RN Given: handoff report given  Patient transferred to: Phase II    Handoff Report: Identifed the Patient, Identified the Reponsible Provider, Reviewed the pertinent medical history, Discussed the surgical course, Reviewed Intra-OP anesthesia mangement and issues during anesthesia, Set expectations for post-procedure period and Allowed opportunity for questions and acknowledgement of understanding      Vitals:  Vitals Value Taken Time   /80 03/25/25 1113   Temp 36    Pulse 66 03/25/25 1112   Resp 14    SpO2 95 % 03/25/25 1112   Vitals shown include unfiled device data.    Electronically Signed By: PRINCESS Rose CRNA  March 25, 2025  11:15 AM

## 2025-03-25 NOTE — ANESTHESIA PREPROCEDURE EVALUATION
Anesthesia Pre-Procedure Evaluation    Patient: Dung Negrete   MRN: [id number] : 1955        Procedure : Procedure(s):  COLONOSCOPY          Past Medical History:   Diagnosis Date    Cellulitis and abscess of leg 2006    Dyspnea 2015    Dyspnea on exertion     Enthesopathy of knee 2006    GERD (gastroesophageal reflux disease) 2017    Health examination of defined subpopulation 2005    History of total hip arthroplasty, left 2018    Hypertension     Mixed hyperlipidemia     Created by Conversion     Polyarthralgia 2017    Primary osteoarthritis involving multiple joints 2017    Sprain of neck     Created by Conversion     Stented coronary artery       Past Surgical History:   Procedure Laterality Date    ANGIOPLASTY Left     IR EXTREMITY ANGIOGRAM BILATERAL  2007    IR EXTREMITY ANGIOGRAM RIGHT  2019    IR EXTREMITY ANGIOGRAM RIGHT  2020    IR EXTREMITY ANGIOGRAM RIGHT  2019    IR EXTREMITY ANGIOGRAM RIGHT  2020    TOTAL HIP ARTHROPLASTY Left 2019    TOTAL HIP ARTHROPLASTY Right       No Known Allergies   Social History     Tobacco Use    Smoking status: Former     Current packs/day: 0.00     Average packs/day: 1 pack/day for 39.0 years (39.0 ttl pk-yrs)     Types: Cigarettes     Start date: 1975     Quit date: 2014     Years since quitting: 10.8    Smokeless tobacco: Former     Quit date: 2005   Substance Use Topics    Alcohol use: Yes     Comment: Alcoholic Drinks/day: occasional on weekends      Wt Readings from Last 1 Encounters:   25 99.8 kg (220 lb)        Anesthesia Evaluation            ROS/MED HX  ENT/Pulmonary:  - neg pulmonary ROS     Neurologic: Comment: Carotid artery stenosis 50% occulusion R ICA, L ICA occulusion asymptomatic  - neg neurologic ROS     Cardiovascular:  - neg cardiovascular ROS   (+)  hypertension- -   -  - -                                      METS/Exercise Tolerance: >4  "METS    Hematologic:  - neg hematologic  ROS     Musculoskeletal:  - neg musculoskeletal ROS     GI/Hepatic:  - neg GI/hepatic ROS   (+) GERD, Asymptomatic on medication,                  Renal/Genitourinary:  - neg Renal ROS     Endo:  - neg endo ROS     Psychiatric/Substance Use:  - neg psychiatric ROS     Infectious Disease:  - neg infectious disease ROS     Malignancy:  - neg malignancy ROS     Other:            Physical Exam    Airway  airway exam normal      Mallampati: II       Respiratory Devices and Support         Dental           Cardiovascular   cardiovascular exam normal          Pulmonary   pulmonary exam normal                OUTSIDE LABS:  CBC:   Lab Results   Component Value Date    WBC 7.7 07/19/2024    WBC 6.6 08/13/2019    HGB 13.6 07/19/2024    HGB 14.4 05/22/2020    HCT 40.3 07/19/2024    HCT 44.7 08/13/2019     07/19/2024     05/22/2020     BMP:   Lab Results   Component Value Date     07/19/2024     03/18/2024    POTASSIUM 4.7 07/19/2024    POTASSIUM 4.8 03/18/2024    CHLORIDE 108 (H) 07/19/2024    CHLORIDE 105 03/18/2024    CO2 21 (L) 07/19/2024    CO2 23 03/18/2024    BUN 24.3 (H) 07/19/2024    BUN 16.6 03/18/2024    CR 1.08 07/19/2024    CR 0.99 03/18/2024     (H) 07/19/2024    GLC 98 03/18/2024     COAGS:   Lab Results   Component Value Date    INR 1.01 05/22/2020     POC: No results found for: \"BGM\", \"HCG\", \"HCGS\"  HEPATIC:   Lab Results   Component Value Date    ALBUMIN 4.6 03/18/2024    PROTTOTAL 7.3 03/18/2024    ALT 31 03/18/2024    AST 30 03/18/2024    ALKPHOS 83 03/18/2024    BILITOTAL 0.4 03/18/2024     OTHER:   Lab Results   Component Value Date    A1C 5.4 03/18/2024    JESSICA 9.4 07/19/2024       Anesthesia Plan    ASA Status:  3    NPO Status:  NPO Appropriate    Anesthesia Type: MAC.              Consents    Anesthesia Plan(s) and associated risks, benefits, and realistic alternatives discussed. Questions answered and patient/representative(s) " "expressed understanding.     - Discussed:     - Discussed with:  Patient            Postoperative Care       PONV prophylaxis: Ondansetron (or other 5HT-3), Dexamethasone or Solumedrol, Background Propofol Infusion     Comments:    Other Comments: Maps > 85 given carotid stenosis            Julio Weathers MD    Clinically Significant Risk Factors Present on Admission                 # Drug Induced Platelet Defect: home medication list includes an antiplatelet medication   # Hypertension: Home medication list includes antihypertensive(s)           # Obesity: Estimated body mass index is 30.68 kg/m  as calculated from the following:    Height as of this encounter: 1.803 m (5' 11\").    Weight as of this encounter: 99.8 kg (220 lb).                "

## 2025-03-28 SDOH — HEALTH STABILITY: PHYSICAL HEALTH: ON AVERAGE, HOW MANY DAYS PER WEEK DO YOU ENGAGE IN MODERATE TO STRENUOUS EXERCISE (LIKE A BRISK WALK)?: 3 DAYS

## 2025-03-28 SDOH — HEALTH STABILITY: PHYSICAL HEALTH: ON AVERAGE, HOW MANY MINUTES DO YOU ENGAGE IN EXERCISE AT THIS LEVEL?: 60 MIN

## 2025-03-28 ASSESSMENT — SOCIAL DETERMINANTS OF HEALTH (SDOH): HOW OFTEN DO YOU GET TOGETHER WITH FRIENDS OR RELATIVES?: TWICE A WEEK

## 2025-03-31 ENCOUNTER — OFFICE VISIT (OUTPATIENT)
Dept: FAMILY MEDICINE | Facility: CLINIC | Age: 70
End: 2025-03-31
Payer: COMMERCIAL

## 2025-03-31 VITALS
BODY MASS INDEX: 31.5 KG/M2 | SYSTOLIC BLOOD PRESSURE: 136 MMHG | TEMPERATURE: 98.6 F | RESPIRATION RATE: 20 BRPM | HEART RATE: 79 BPM | WEIGHT: 225 LBS | OXYGEN SATURATION: 97 % | HEIGHT: 71 IN | DIASTOLIC BLOOD PRESSURE: 76 MMHG

## 2025-03-31 DIAGNOSIS — I73.9 PVD (PERIPHERAL VASCULAR DISEASE): ICD-10-CM

## 2025-03-31 DIAGNOSIS — Z00.00 MEDICARE ANNUAL WELLNESS VISIT, SUBSEQUENT: Primary | ICD-10-CM

## 2025-03-31 DIAGNOSIS — I10 ESSENTIAL HYPERTENSION: ICD-10-CM

## 2025-03-31 DIAGNOSIS — I65.22 LEFT CAROTID ARTERY OCCLUSION: ICD-10-CM

## 2025-03-31 DIAGNOSIS — E78.2 MIXED HYPERLIPIDEMIA: ICD-10-CM

## 2025-03-31 DIAGNOSIS — M10.9 GOUTY ARTHROPATHY: ICD-10-CM

## 2025-03-31 DIAGNOSIS — K21.9 GASTROESOPHAGEAL REFLUX DISEASE WITHOUT ESOPHAGITIS: ICD-10-CM

## 2025-03-31 DIAGNOSIS — R01.1 SYSTOLIC MURMUR: ICD-10-CM

## 2025-03-31 DIAGNOSIS — H91.93 BILATERAL HEARING LOSS, UNSPECIFIED HEARING LOSS TYPE: ICD-10-CM

## 2025-03-31 DIAGNOSIS — N52.9 ERECTILE DYSFUNCTION, UNSPECIFIED ERECTILE DYSFUNCTION TYPE: ICD-10-CM

## 2025-03-31 LAB
ALBUMIN SERPL BCG-MCNC: 4.3 G/DL (ref 3.5–5.2)
ALP SERPL-CCNC: 85 U/L (ref 40–150)
ALT SERPL W P-5'-P-CCNC: 31 U/L (ref 0–70)
ANION GAP SERPL CALCULATED.3IONS-SCNC: 10 MMOL/L (ref 7–15)
AST SERPL W P-5'-P-CCNC: 29 U/L (ref 0–45)
BILIRUB SERPL-MCNC: 0.3 MG/DL
BUN SERPL-MCNC: 19.1 MG/DL (ref 8–23)
CALCIUM SERPL-MCNC: 9.8 MG/DL (ref 8.8–10.4)
CHLORIDE SERPL-SCNC: 107 MMOL/L (ref 98–107)
CHOLEST SERPL-MCNC: 136 MG/DL
CREAT SERPL-MCNC: 1.01 MG/DL (ref 0.67–1.17)
EGFRCR SERPLBLD CKD-EPI 2021: 80 ML/MIN/1.73M2
FASTING STATUS PATIENT QL REPORTED: ABNORMAL
FASTING STATUS PATIENT QL REPORTED: NORMAL
GLUCOSE SERPL-MCNC: 101 MG/DL (ref 70–99)
HCO3 SERPL-SCNC: 24 MMOL/L (ref 22–29)
HDLC SERPL-MCNC: 43 MG/DL
LDLC SERPL CALC-MCNC: 68 MG/DL
NONHDLC SERPL-MCNC: 93 MG/DL
POTASSIUM SERPL-SCNC: 4.9 MMOL/L (ref 3.4–5.3)
PROT SERPL-MCNC: 7 G/DL (ref 6.4–8.3)
SODIUM SERPL-SCNC: 141 MMOL/L (ref 135–145)
TRIGL SERPL-MCNC: 127 MG/DL
URATE SERPL-MCNC: 5.1 MG/DL (ref 3.4–7)

## 2025-03-31 PROCEDURE — 80053 COMPREHEN METABOLIC PANEL: CPT | Performed by: FAMILY MEDICINE

## 2025-03-31 PROCEDURE — 36415 COLL VENOUS BLD VENIPUNCTURE: CPT | Performed by: FAMILY MEDICINE

## 2025-03-31 PROCEDURE — 3075F SYST BP GE 130 - 139MM HG: CPT | Performed by: FAMILY MEDICINE

## 2025-03-31 PROCEDURE — 80061 LIPID PANEL: CPT | Performed by: FAMILY MEDICINE

## 2025-03-31 PROCEDURE — 84550 ASSAY OF BLOOD/URIC ACID: CPT | Performed by: FAMILY MEDICINE

## 2025-03-31 PROCEDURE — G0439 PPPS, SUBSEQ VISIT: HCPCS | Performed by: FAMILY MEDICINE

## 2025-03-31 PROCEDURE — 3078F DIAST BP <80 MM HG: CPT | Performed by: FAMILY MEDICINE

## 2025-03-31 PROCEDURE — 1126F AMNT PAIN NOTED NONE PRSNT: CPT | Performed by: FAMILY MEDICINE

## 2025-03-31 RX ORDER — TADALAFIL 10 MG/1
10 TABLET ORAL DAILY PRN
Qty: 30 TABLET | Refills: 0 | Status: SHIPPED | OUTPATIENT
Start: 2025-03-31

## 2025-03-31 RX ORDER — CILOSTAZOL 100 MG/1
TABLET ORAL
Status: SHIPPED
Start: 2025-03-31

## 2025-03-31 ASSESSMENT — ACTIVITIES OF DAILY LIVING (ADL)
DRESSING/BATHING_DIFFICULTY: NO
DOING_ERRANDS_INDEPENDENTLY_DIFFICULTY: NO
CONCENTRATING,_REMEMBERING_OR_MAKING_DECISIONS_DIFFICULTY: NO
WALKING_OR_CLIMBING_STAIRS_DIFFICULTY: NO
DIFFICULTY_EATING/SWALLOWING: NO
WEAR_GLASSES_OR_BLIND: YES
FALL_HISTORY_WITHIN_LAST_SIX_MONTHS: NO
TOILETING_ISSUES: NO
DIFFICULTY_COMMUNICATING: NO
CHANGE_IN_FUNCTIONAL_STATUS_SINCE_ONSET_OF_CURRENT_ILLNESS/INJURY: NO
HEARING_DIFFICULTY_OR_DEAF: NO

## 2025-03-31 ASSESSMENT — PAIN SCALES - GENERAL: PAINLEVEL_OUTOF10: NO PAIN (0)

## 2025-03-31 NOTE — PROGRESS NOTES
"Preventive Care Visit  [facility]  Elmo Crump MD, Family Medicine  Mar 31, 2025      Assessment & Plan     Shabbir was seen today for recheck medication and wellness visit.    Diagnoses and all orders for this visit:    Medicare annual wellness visit, subsequent    PVD (peripheral vascular disease)  -     cilostazol (PLETAL) 100 MG tablet; TAKE 1 TABLET BY MOUTH TWICE A DAY    Gouty arthropathy  -     Uric acid; Future    Mixed hyperlipidemia  -     Comprehensive metabolic panel (BMP + Alb, Alk Phos, ALT, AST, Total. Bili, TP); Future  -     Lipid panel reflex to direct LDL Fasting; Future    Gastroesophageal reflux disease without esophagitis    Essential hypertension  -     Comprehensive metabolic panel (BMP + Alb, Alk Phos, ALT, AST, Total. Bili, TP); Future    Left carotid artery occlusion  -     Comprehensive metabolic panel (BMP + Alb, Alk Phos, ALT, AST, Total. Bili, TP); Future  -     Lipid panel reflex to direct LDL Fasting; Future    Erectile dysfunction, unspecified erectile dysfunction type  -     tadalafil (CIALIS) 10 MG tablet; Take 1 tablet (10 mg) by mouth daily as needed.    Bilateral hearing loss, unspecified hearing loss type    Systolic murmur    Other orders  -     aspirin (ASA) 81 MG EC tablet; Take 1 tablet (81 mg) by mouth daily.                   BMI  Estimated body mass index is 31.38 kg/m  as calculated from the following:    Height as of this encounter: 1.803 m (5' 11\").    Weight as of this encounter: 102.1 kg (225 lb).   Weight management plan: Discussed healthy diet and exercise guidelines    Counseling  Appropriate preventive services were addressed with this patient via screening, questionnaire, or discussion as appropriate for fall prevention, nutrition, physical activity, Tobacco-use cessation, social engagement, weight loss and cognition.  Checklist reviewing preventive services available has been given to the patient.  Reviewed patient's diet, addressing " concerns and/or questions.   He is at risk for lack of exercise and has been provided with information to increase physical activity for the benefit of his well-being.   The patient was instructed to see the dentist every 6 months.   The patient was provided with written information regarding signs of hearing loss.           Shirlene Shea is a 70 year old, presenting for the following:  Recheck Medication and Wellness Visit      He has hypertension, dyslipidemia, history of left carotid occlusion, peripheral vascular disease.  He is on medications for vascular disease risk reduction.  Patient has subtle systolic murmur on exam today discussed obtaining an echocardiogram.  Reviewed his evaluation and follow-up with vascular disease specialist.  No concerns for gout outbreak remains on suppression therapy.  Does report aches and pains in joints.  Plans to see a chiropractor.  Discussed the benefits of exercise.  Continues to take celecoxib.  Reports some acid reflux symptoms.  Efforts to taper off of PPI have been unsuccessful in the past recommend continued use of PPI to control acid reflux symptoms.  Discussed management of erectile dysfunction.  Discussed hearing difficulties and referral to audiology.  Reviewed appropriate immunizations and other routine health screening measures.            3/31/2025     9:27 AM   Additional Questions   Roomed by am cma     Do you have a current opioid prescription? no  Do you use any other controlled substances or medications that are not prescribed by a provider?  no          Advance Care Planning  Patient does not have a Health Care Directive: Advance Directive received and scanned. Click on Code in the patient header to view.      3/28/2025   General Health   How would you rate your overall physical health? Good   Feel stress (tense, anxious, or unable to sleep) Not at all         3/28/2025   Nutrition   Diet: Regular (no restrictions)         3/28/2025   Exercise   Days  per week of moderate/strenous exercise 3 days   Average minutes spent exercising at this level 60 min         3/28/2025   Social Factors   Frequency of gathering with friends or relatives Twice a week   Worry food won't last until get money to buy more No   Food not last or not have enough money for food? No   Do you have housing? (Housing is defined as stable permanent housing and does not include staying ouside in a car, in a tent, in an abandoned building, in an overnight shelter, or couch-surfing.) Yes   Are you worried about losing your housing? No   Lack of transportation? No   Unable to get utilities (heat,electricity)? No         3/31/2025   Fall Risk   Fallen 2 or more times in the past year? No   Trouble with walking or balance? No          3/28/2025   Activities of Daily Living- Home Safety   Needs help with the following daily activites None of the above   Safety concerns in the home None of the above         3/28/2025   Dental   Dentist two times every year? (!) NO         3/28/2025   Hearing Screening   Hearing concerns? (!) I NEED TO ASK PEOPLE TO SPEAK UP OR REPEAT THEMSELVES.    (!) IT'S HARD TO FOLLOW A CONVERSATION IN A NOISY RESTAURANT OR CROWDED ROOM.    (!) TROUBLE UNDERSTANDING SOFT OR WHISPERED SPEECH.       Multiple values from one day are sorted in reverse-chronological order         3/28/2025   Driving Risk Screening   Patient/family members have concerns about driving No         3/28/2025   General Alertness/Fatigue Screening   Have you been more tired than usual lately? No         3/28/2025   Urinary Incontinence Screening   Bothered by leaking urine in past 6 months No           3/12/2024   TB Screening   Were you born outside of the [place]? No           Today's PHQ-2 Score:       3/31/2025     9:16 AM   PHQ-2 ( 1999 Pf[employer])   Q1: Little interest or pleasure in doing things 0   Q2: Feeling down, depressed or hopeless 0   PHQ-2 Score 0    Q1: Little interest or pleasure in doing things Not  at all   Q2: Feeling down, depressed or hopeless Not at all   PHQ-2 Score 0       Patient-reported           3/28/2025   Substance Use   Alcohol more than 3/day or more than 7/wk No   Do you have a current opioid prescription? No   How severe/bad is pain from 1 to 10? 3/10   Do you use any other substances recreationally? No     Social History     Tobacco Use    Smoking status: Former     Current packs/day: 0.00     Average packs/day: 1 pack/day for 39.0 years (39.0 ttl pk-yrs)     Types: Cigarettes     Start date: 6/1/1975     Quit date: 6/1/2014     Years since quitting: 10.8    Smokeless tobacco: Former     Quit date: 2/4/2005   Vaping Use    Vaping status: Never Used   Substance Use Topics    Alcohol use: Yes     Comment: Alcoholic Drinks/day: occasional on weekends    Drug use: Yes     Types: Marijuana       ASCVD Risk   The 10-year ASCVD risk score (Kailyn WANG, et al., 2019) is: 21.8%    Values used to calculate the score:      Age: 70 years      Sex: Male      Is Non- : No      Diabetic: No      Tobacco smoker: No      Systolic Blood Pressure: 136 mmHg      Is BP treated: Yes      HDL Cholesterol: 47 mg/dL      Total Cholesterol: 145 mg/dL            Reviewed and updated as needed this visit by Provider                      Current providers sharing in care for this patient include:  Patient Care Team:  Elmo Crump MD as PCP - General  Elmo Crupm MD as Assigned PCP  Shauna Stroud PA-C as Assigned Heart and Vascular Surgical Provider    The following health maintenance items are reviewed in Epic and correct as of today:  Health Maintenance   Topic Date Due    ANNUAL REVIEW OF HM ORDERS  Never done    HEPATITIS C SCREENING  Never done    LUNG CANCER SCREENING  12/04/2020    LIPID  03/18/2025    URIC ACID  03/18/2025    MEDICARE ANNUAL WELLNESS VISIT  03/18/2025    COVID-19 Vaccine (6 - 2024-25 season) 04/09/2025    BMP  07/19/2025    FALL RISK ASSESSMENT   "03/31/2026    DIABETES SCREENING  07/19/2027    RSV VACCINE (1 - 1-dose 75+ series) 01/13/2029    DTAP/TDAP/TD IMMUNIZATION (3 - Td or Tdap) 02/19/2030    COLORECTAL CANCER SCREENING  03/25/2030    ADVANCE CARE PLANNING  03/31/2030    PHQ-2 (once per calendar year)  Completed    INFLUENZA VACCINE  Completed    Pneumococcal Vaccine: 50+ Years  Completed    ZOSTER IMMUNIZATION  Completed    AORTIC ANEURYSM SCREENING (SYSTEM ASSIGNED)  Completed    HPV IMMUNIZATION  Aged Out    MENINGITIS IMMUNIZATION  Aged Out       Complete review of systems is obtained.  Other than the specific considerations noted above complete review of systems is negative.       Objective    Exam  /76   Pulse 79   Temp 98.6  F (37  C)   Resp 20   Ht 1.803 m (5' 11\")   Wt 102.1 kg (225 lb)   SpO2 97%   BMI 31.38 kg/m     Estimated body mass index is 31.38 kg/m  as calculated from the following:    Height as of this encounter: 1.803 m (5' 11\").    Weight as of this encounter: 102.1 kg (225 lb).    Wt Readings from Last 3 Encounters:   03/31/25 102.1 kg (225 lb)   03/25/25 99.8 kg (220 lb)   03/18/24 101.5 kg (223 lb 11.2 oz)        BP Readings from Last 6 Encounters:   03/31/25 136/76   03/25/25 (!) 152/79   01/27/25 (!) 167/96   06/04/24 123/74   03/18/24 (!) 146/78   01/08/24 131/85        Hemoglobin A1C   Date Value Ref Range Status   03/18/2024 5.4 0.0 - 5.6 % Final     Comment:     Normal <5.7%   Prediabetes 5.7-6.4%    Diabetes 6.5% or higher     Note: Adopted from ADA consensus guidelines.       Physical Exam        General Appearance:    Alert, cooperative, no distress   Eyes:   No scleral icterus or conjunctival irritation       Ears:    Normal TM's and external ear canals, both ears   Throat:   Lips, mucosa, and tongue normal; teeth and gums normal   Neck:   Supple, symmetrical, trachea midline, no adenopathy;        thyroid:  No enlargement/tenderness/nodules   Lungs:     Clear to auscultation bilaterally, respirations " unlabored, no wheezes or crackles   Heart:    Regular rate and rhythm, soft systolic murmur seems to localize to the right sternal border   Abdomen:    Soft, no distention, no tenderness on palpation, no masses, no organomegaly     Extremities:  No edema, no joint swelling or redness, no evidence of any injuries   Skin:  No concerning skin findings, no suspicious moles, no rashes, plantar wart bottom of the right foot   Neurologic:  On gross examination there is no motor or sensory deficit.  Patient walks with a normal gait                   3/31/2025   Mini Cog   Clock Draw Score 2 Normal   3 Item Recall 3 objects recalled   Mini Cog Total Score 5              Signed Electronically by: Elmo Crump MD

## 2025-04-01 ENCOUNTER — TELEPHONE (OUTPATIENT)
Dept: SURGERY | Facility: CLINIC | Age: 70
End: 2025-04-01
Payer: COMMERCIAL

## 2025-04-01 NOTE — TELEPHONE ENCOUNTER
Called to relay the colonoscopy results per the message from MARGOT:    One of your polyps is a Tubular Adenoma and these type of lesions need to be followed.  The other polyp is not of concern.  I would recommend you have your next colonoscopy in 5 years. The year 2030.     Patient had no questions at this time.    Mirian Sousa RN  [facility]  General Surgery  [address] [place] [address]  Office:[phone]  Employed by [employer]

## 2025-04-05 ENCOUNTER — HEALTH MAINTENANCE LETTER (OUTPATIENT)
Age: 70
End: 2025-04-05

## 2025-04-25 ENCOUNTER — TRANSFERRED RECORDS (OUTPATIENT)
Dept: HEALTH INFORMATION MANAGEMENT | Facility: CLINIC | Age: 70
End: 2025-04-25
Payer: COMMERCIAL

## 2025-05-24 DIAGNOSIS — I10 ESSENTIAL HYPERTENSION: ICD-10-CM

## 2025-05-25 DIAGNOSIS — E78.2 MIXED HYPERLIPIDEMIA: ICD-10-CM

## 2025-05-25 DIAGNOSIS — M25.512 CHRONIC PAIN OF BOTH SHOULDERS: ICD-10-CM

## 2025-05-25 DIAGNOSIS — M25.511 CHRONIC PAIN OF BOTH SHOULDERS: ICD-10-CM

## 2025-05-25 DIAGNOSIS — G89.29 CHRONIC PAIN OF BOTH SHOULDERS: ICD-10-CM

## 2025-05-25 DIAGNOSIS — K21.9 GASTROESOPHAGEAL REFLUX DISEASE WITHOUT ESOPHAGITIS: ICD-10-CM

## 2025-05-27 ENCOUNTER — TRANSFERRED RECORDS (OUTPATIENT)
Dept: HEALTH INFORMATION MANAGEMENT | Facility: CLINIC | Age: 70
End: 2025-05-27
Payer: COMMERCIAL

## 2025-05-27 ENCOUNTER — TELEPHONE (OUTPATIENT)
Dept: FAMILY MEDICINE | Facility: CLINIC | Age: 70
End: 2025-05-27
Payer: COMMERCIAL

## 2025-05-27 RX ORDER — LISINOPRIL 40 MG/1
40 TABLET ORAL DAILY
Qty: 90 TABLET | Refills: 2 | Status: SHIPPED | OUTPATIENT
Start: 2025-05-27

## 2025-05-27 RX ORDER — ROSUVASTATIN CALCIUM 40 MG/1
40 TABLET, COATED ORAL AT BEDTIME
Qty: 90 TABLET | Refills: 2 | Status: SHIPPED | OUTPATIENT
Start: 2025-05-27

## 2025-05-27 RX ORDER — LANSOPRAZOLE 30 MG/1
30 CAPSULE, DELAYED RELEASE ORAL DAILY
Qty: 90 CAPSULE | Refills: 2 | Status: SHIPPED | OUTPATIENT
Start: 2025-05-27

## 2025-05-27 RX ORDER — CELECOXIB 100 MG/1
100 CAPSULE ORAL DAILY
Qty: 90 CAPSULE | Refills: 3 | Status: SHIPPED | OUTPATIENT
Start: 2025-05-27

## 2025-05-27 NOTE — TELEPHONE ENCOUNTER
Forms/Letter Request    Type of form/letter:  blood thinner HOLD request.       Do we have the form/letter: Yes: DR Crump's inbox    Who is the form from? [facility]    Where did/will the form come from? form was faxed in    When is form/letter needed by:     How would you like the form/letter returned: Fax : [phone].    Plavix hold for 7 hours prior to spinal injection procedure.

## 2025-05-28 NOTE — TELEPHONE ENCOUNTER
Patient is seen by a vascular disease specialist who manages his peripheral vascular disease and prescribes the Pletal medication that is the blood thinner.  Please have him contact the vascular disease specialist to get their approval on hold of his anticoagulation.

## 2025-05-28 NOTE — TELEPHONE ENCOUNTER
Called and spoke with patient. Relayed provider response and recommendations. Patient endorses understanding and will reach out to [facility] to have the form faxed to [facility].  Denies further questions or concerns at this time.    Caryn Langford RN  [facility]

## 2025-06-02 ENCOUNTER — TELEPHONE (OUTPATIENT)
Dept: VASCULAR SURGERY | Facility: CLINIC | Age: 70
End: 2025-06-02
Payer: COMMERCIAL

## 2025-06-02 NOTE — TELEPHONE ENCOUNTER
Patient called, he is needing back injection from [place] [facility], they are asking him to hold his cilostazol. [place] [phone]

## 2025-06-02 NOTE — TELEPHONE ENCOUNTER
[facility] called back, gave verbal ok for patient to hold cilostazol for 3 days. He will stay on the aspirin.

## 2025-06-02 NOTE — TELEPHONE ENCOUNTER
Left message at [facility] asking for callback, need to know how long his aspirin and cilostazol need to be held for prior to procedure.l

## 2025-06-10 ENCOUNTER — TRANSFERRED RECORDS (OUTPATIENT)
Dept: HEALTH INFORMATION MANAGEMENT | Facility: CLINIC | Age: 70
End: 2025-06-10
Payer: COMMERCIAL

## 2025-06-11 DIAGNOSIS — I73.9 PVD (PERIPHERAL VASCULAR DISEASE): ICD-10-CM

## 2025-06-11 RX ORDER — CILOSTAZOL 100 MG/1
TABLET ORAL
Qty: 90 TABLET | Refills: 1 | Status: SHIPPED | OUTPATIENT
Start: 2025-06-11

## 2025-06-11 NOTE — TELEPHONE ENCOUNTER
"Patient last saw Shauna Stroud PA-C on 01/27/2025, \"Patient is compliant with his aspirin, statin, and cilostazol.\" Patient is to follow-up in one year. Cilostazol refilled.  "

## 2025-06-11 NOTE — TELEPHONE ENCOUNTER
Cilostazol medication needs to be refilled.   Vascular provider was managing however that provider is no longer practicing - left clinic. Has seen Physician Assistant this year. Will send to that team for refill approval  .    [phone]- pt phone number. Please call him about this.    Injection yesterday- he is no longer holding this medication.(3 day hold).  Needs to restart but doesn't have refill to do so.

## 2025-07-21 DIAGNOSIS — I73.9 PVD (PERIPHERAL VASCULAR DISEASE): ICD-10-CM

## 2025-07-21 RX ORDER — CILOSTAZOL 100 MG/1
100 TABLET ORAL
Qty: 180 TABLET | Refills: 1 | Status: SHIPPED | OUTPATIENT
Start: 2025-07-21

## 2025-08-23 DIAGNOSIS — I10 ESSENTIAL HYPERTENSION: ICD-10-CM

## 2025-08-25 RX ORDER — AMLODIPINE BESYLATE 10 MG/1
10 TABLET ORAL DAILY
Qty: 90 TABLET | Refills: 1 | Status: SHIPPED | OUTPATIENT
Start: 2025-08-25

## 2025-09-03 ENCOUNTER — TRANSFERRED RECORDS (OUTPATIENT)
Dept: HEALTH INFORMATION MANAGEMENT | Facility: CLINIC | Age: 70
End: 2025-09-03
Payer: COMMERCIAL